# Patient Record
Sex: FEMALE | Race: WHITE | NOT HISPANIC OR LATINO
[De-identification: names, ages, dates, MRNs, and addresses within clinical notes are randomized per-mention and may not be internally consistent; named-entity substitution may affect disease eponyms.]

---

## 2017-04-27 ENCOUNTER — APPOINTMENT (OUTPATIENT)
Dept: ENDOCRINOLOGY | Facility: CLINIC | Age: 82
End: 2017-04-27

## 2017-04-27 VITALS
SYSTOLIC BLOOD PRESSURE: 110 MMHG | BODY MASS INDEX: 23.19 KG/M2 | HEIGHT: 58.2 IN | OXYGEN SATURATION: 95 % | DIASTOLIC BLOOD PRESSURE: 70 MMHG | WEIGHT: 112 LBS | HEART RATE: 93 BPM

## 2017-07-11 ENCOUNTER — APPOINTMENT (OUTPATIENT)
Dept: VASCULAR SURGERY | Facility: CLINIC | Age: 82
End: 2017-07-11

## 2017-07-11 VITALS
HEIGHT: 58 IN | DIASTOLIC BLOOD PRESSURE: 80 MMHG | WEIGHT: 110 LBS | HEART RATE: 67 BPM | BODY MASS INDEX: 23.09 KG/M2 | TEMPERATURE: 97.9 F | SYSTOLIC BLOOD PRESSURE: 169 MMHG

## 2017-07-11 DIAGNOSIS — Z87.898 PERSONAL HISTORY OF OTHER SPECIFIED CONDITIONS: ICD-10-CM

## 2017-09-07 ENCOUNTER — RX RENEWAL (OUTPATIENT)
Age: 82
End: 2017-09-07

## 2017-10-05 ENCOUNTER — MEDICATION RENEWAL (OUTPATIENT)
Age: 82
End: 2017-10-05

## 2017-11-09 ENCOUNTER — APPOINTMENT (OUTPATIENT)
Dept: ENDOCRINOLOGY | Facility: CLINIC | Age: 82
End: 2017-11-09
Payer: MEDICARE

## 2017-11-09 VITALS — WEIGHT: 110 LBS | BODY MASS INDEX: 23.09 KG/M2 | HEIGHT: 58 IN | OXYGEN SATURATION: 97 % | HEART RATE: 69 BPM

## 2017-11-09 VITALS — SYSTOLIC BLOOD PRESSURE: 128 MMHG | DIASTOLIC BLOOD PRESSURE: 80 MMHG

## 2017-11-09 PROCEDURE — 99213 OFFICE O/P EST LOW 20 MIN: CPT

## 2017-12-01 ENCOUNTER — RX RENEWAL (OUTPATIENT)
Age: 82
End: 2017-12-01

## 2017-12-28 ENCOUNTER — MEDICATION RENEWAL (OUTPATIENT)
Age: 82
End: 2017-12-28

## 2018-02-12 ENCOUNTER — RX RENEWAL (OUTPATIENT)
Age: 83
End: 2018-02-12

## 2018-02-27 ENCOUNTER — APPOINTMENT (OUTPATIENT)
Dept: VASCULAR SURGERY | Facility: CLINIC | Age: 83
End: 2018-02-27
Payer: MEDICARE

## 2018-02-27 VITALS
BODY MASS INDEX: 23.09 KG/M2 | SYSTOLIC BLOOD PRESSURE: 130 MMHG | DIASTOLIC BLOOD PRESSURE: 86 MMHG | WEIGHT: 110 LBS | HEIGHT: 58 IN | HEART RATE: 72 BPM | TEMPERATURE: 98 F

## 2018-02-27 DIAGNOSIS — Z82.49 FAMILY HISTORY OF ISCHEMIC HEART DISEASE AND OTHER DISEASES OF THE CIRCULATORY SYSTEM: ICD-10-CM

## 2018-02-27 DIAGNOSIS — M48.00 SPINAL STENOSIS, SITE UNSPECIFIED: ICD-10-CM

## 2018-02-27 DIAGNOSIS — Z87.19 PERSONAL HISTORY OF OTHER DISEASES OF THE DIGESTIVE SYSTEM: ICD-10-CM

## 2018-02-27 DIAGNOSIS — Z87.42 PERSONAL HISTORY OF OTHER DISEASES OF THE FEMALE GENITAL TRACT: ICD-10-CM

## 2018-02-27 PROCEDURE — 99213 OFFICE O/P EST LOW 20 MIN: CPT

## 2018-02-27 PROCEDURE — 93971 EXTREMITY STUDY: CPT

## 2018-05-09 ENCOUNTER — APPOINTMENT (OUTPATIENT)
Dept: ENDOCRINOLOGY | Facility: CLINIC | Age: 83
End: 2018-05-09
Payer: MEDICARE

## 2018-05-09 VITALS
SYSTOLIC BLOOD PRESSURE: 120 MMHG | HEIGHT: 58 IN | HEART RATE: 75 BPM | OXYGEN SATURATION: 96 % | DIASTOLIC BLOOD PRESSURE: 80 MMHG | WEIGHT: 109 LBS | BODY MASS INDEX: 22.88 KG/M2

## 2018-05-09 PROCEDURE — 99213 OFFICE O/P EST LOW 20 MIN: CPT

## 2018-05-09 RX ORDER — ALENDRONATE SODIUM 70 MG/1
TABLET ORAL
Refills: 0 | Status: DISCONTINUED | COMMUNITY
End: 2018-05-09

## 2018-05-25 ENCOUNTER — RX RENEWAL (OUTPATIENT)
Age: 83
End: 2018-05-25

## 2018-06-25 ENCOUNTER — APPOINTMENT (OUTPATIENT)
Dept: GASTROENTEROLOGY | Facility: CLINIC | Age: 83
End: 2018-06-25
Payer: MEDICARE

## 2018-06-25 VITALS
DIASTOLIC BLOOD PRESSURE: 80 MMHG | WEIGHT: 110 LBS | HEART RATE: 65 BPM | OXYGEN SATURATION: 93 % | HEIGHT: 59 IN | SYSTOLIC BLOOD PRESSURE: 112 MMHG | BODY MASS INDEX: 22.18 KG/M2

## 2018-06-25 DIAGNOSIS — M19.90 UNSPECIFIED OSTEOARTHRITIS, UNSPECIFIED SITE: ICD-10-CM

## 2018-06-25 DIAGNOSIS — Z78.9 OTHER SPECIFIED HEALTH STATUS: ICD-10-CM

## 2018-06-25 DIAGNOSIS — Z83.79 FAMILY HISTORY OF OTHER DISEASES OF THE DIGESTIVE SYSTEM: ICD-10-CM

## 2018-06-25 DIAGNOSIS — Z80.0 FAMILY HISTORY OF MALIGNANT NEOPLASM OF DIGESTIVE ORGANS: ICD-10-CM

## 2018-06-25 DIAGNOSIS — N83.202 UNSPECIFIED OVARIAN CYST, LEFT SIDE: ICD-10-CM

## 2018-06-25 DIAGNOSIS — Z82.49 FAMILY HISTORY OF ISCHEMIC HEART DISEASE AND OTHER DISEASES OF THE CIRCULATORY SYSTEM: ICD-10-CM

## 2018-06-25 DIAGNOSIS — M19.231 SECONDARY OSTEOARTHRITIS, RIGHT WRIST: ICD-10-CM

## 2018-06-25 PROCEDURE — 99204 OFFICE O/P NEW MOD 45 MIN: CPT

## 2018-06-25 RX ORDER — DOCUSATE SODIUM 100 MG/1
TABLET ORAL
Refills: 0 | Status: ACTIVE | COMMUNITY

## 2018-07-30 ENCOUNTER — RECORD ABSTRACTING (OUTPATIENT)
Age: 83
End: 2018-07-30

## 2018-07-30 DIAGNOSIS — K21.9 GASTRO-ESOPHAGEAL REFLUX DISEASE W/OUT ESOPHAGITIS: ICD-10-CM

## 2018-07-30 DIAGNOSIS — I73.9 PERIPHERAL VASCULAR DISEASE, UNSPECIFIED: ICD-10-CM

## 2018-08-13 ENCOUNTER — APPOINTMENT (OUTPATIENT)
Dept: GASTROENTEROLOGY | Facility: CLINIC | Age: 83
End: 2018-08-13
Payer: MEDICARE

## 2018-08-13 VITALS
OXYGEN SATURATION: 96 % | BODY MASS INDEX: 22.18 KG/M2 | SYSTOLIC BLOOD PRESSURE: 160 MMHG | HEIGHT: 59 IN | WEIGHT: 110 LBS | HEART RATE: 75 BPM | TEMPERATURE: 96.9 F | DIASTOLIC BLOOD PRESSURE: 90 MMHG

## 2018-08-13 DIAGNOSIS — R19.8 OTHER SPECIFIED SYMPTOMS AND SIGNS INVOLVING THE DIGESTIVE SYSTEM AND ABDOMEN: ICD-10-CM

## 2018-08-13 PROCEDURE — 99214 OFFICE O/P EST MOD 30 MIN: CPT

## 2018-08-17 ENCOUNTER — APPOINTMENT (OUTPATIENT)
Dept: PULMONOLOGY | Facility: CLINIC | Age: 83
End: 2018-08-17
Payer: MEDICARE

## 2018-08-17 ENCOUNTER — MESSAGE (OUTPATIENT)
Age: 83
End: 2018-08-17

## 2018-08-17 VITALS — SYSTOLIC BLOOD PRESSURE: 148 MMHG | DIASTOLIC BLOOD PRESSURE: 80 MMHG

## 2018-08-17 VITALS
HEIGHT: 59 IN | BODY MASS INDEX: 22.18 KG/M2 | RESPIRATION RATE: 16 BRPM | OXYGEN SATURATION: 95 % | WEIGHT: 110 LBS | HEART RATE: 71 BPM | TEMPERATURE: 98.4 F

## 2018-08-17 PROCEDURE — 99213 OFFICE O/P EST LOW 20 MIN: CPT

## 2018-08-19 NOTE — REVIEW OF SYSTEMS
[Abdominal Pain] : abdominal pain [Constipation] : constipation [Anxiety] : anxiety [Negative] : Heme/Lymph

## 2018-08-19 NOTE — HISTORY OF PRESENT ILLNESS
[FreeTextEntry1] : constipation [de-identified] : Here to f/u on GI eval for constipation. Told of dx of IBS-constipation. Did not try meds yet due to cost. Was satisfied with consultant but not sure she wants to go back

## 2018-08-19 NOTE — ASSESSMENT
[FreeTextEntry1] : GI note appreciated.\par \par Will give trial of Linzess-samples given.\par \par Also told to monitor diet\par

## 2018-08-28 ENCOUNTER — APPOINTMENT (OUTPATIENT)
Dept: GASTROENTEROLOGY | Facility: HOSPITAL | Age: 83
End: 2018-08-28

## 2018-10-01 ENCOUNTER — RX RENEWAL (OUTPATIENT)
Age: 83
End: 2018-10-01

## 2018-10-04 ENCOUNTER — APPOINTMENT (OUTPATIENT)
Dept: PULMONOLOGY | Facility: CLINIC | Age: 83
End: 2018-10-04
Payer: MEDICARE

## 2018-10-04 VITALS
HEART RATE: 70 BPM | DIASTOLIC BLOOD PRESSURE: 74 MMHG | RESPIRATION RATE: 12 BRPM | SYSTOLIC BLOOD PRESSURE: 124 MMHG | TEMPERATURE: 97.9 F | OXYGEN SATURATION: 97 %

## 2018-10-04 PROCEDURE — 90662 IIV NO PRSV INCREASED AG IM: CPT

## 2018-10-04 PROCEDURE — 99213 OFFICE O/P EST LOW 20 MIN: CPT | Mod: 25

## 2018-10-04 PROCEDURE — G0008: CPT

## 2018-10-08 ENCOUNTER — APPOINTMENT (OUTPATIENT)
Dept: ENDOCRINOLOGY | Facility: CLINIC | Age: 83
End: 2018-10-08
Payer: MEDICARE

## 2018-10-08 VITALS
WEIGHT: 112 LBS | BODY MASS INDEX: 22.58 KG/M2 | DIASTOLIC BLOOD PRESSURE: 60 MMHG | SYSTOLIC BLOOD PRESSURE: 112 MMHG | HEIGHT: 59 IN

## 2018-10-08 LAB
ALBUMIN SERPL ELPH-MCNC: 4.1 G/DL
ALP BLD-CCNC: 51 U/L
ALT SERPL-CCNC: 16 U/L
ANION GAP SERPL CALC-SCNC: 14 MMOL/L
AST SERPL-CCNC: 28 U/L
BASOPHILS # BLD AUTO: 0.04 K/UL
BASOPHILS NFR BLD AUTO: 0.6 %
BILIRUB SERPL-MCNC: 0.2 MG/DL
BUN SERPL-MCNC: 20 MG/DL
CALCIUM SERPL-MCNC: 10 MG/DL
CHLORIDE SERPL-SCNC: 97 MMOL/L
CO2 SERPL-SCNC: 26 MMOL/L
CREAT SERPL-MCNC: 1.22 MG/DL
EOSINOPHIL # BLD AUTO: 0.15 K/UL
EOSINOPHIL NFR BLD AUTO: 2.3 %
GLUCOSE SERPL-MCNC: 122 MG/DL
HCT VFR BLD CALC: 36.7 %
HGB BLD-MCNC: 11.6 G/DL
IMM GRANULOCYTES NFR BLD AUTO: 0 %
LYMPHOCYTES # BLD AUTO: 2.1 K/UL
LYMPHOCYTES NFR BLD AUTO: 32.4 %
MAN DIFF?: NORMAL
MCHC RBC-ENTMCNC: 29.9 PG
MCHC RBC-ENTMCNC: 31.6 GM/DL
MCV RBC AUTO: 94.6 FL
MONOCYTES # BLD AUTO: 0.4 K/UL
MONOCYTES NFR BLD AUTO: 6.2 %
NEUTROPHILS # BLD AUTO: 3.8 K/UL
NEUTROPHILS NFR BLD AUTO: 58.5 %
PLATELET # BLD AUTO: 208 K/UL
POTASSIUM SERPL-SCNC: 4.5 MMOL/L
PROT SERPL-MCNC: 7 G/DL
RBC # BLD: 3.88 M/UL
RBC # FLD: 14.6 %
SODIUM SERPL-SCNC: 137 MMOL/L
WBC # FLD AUTO: 6.49 K/UL

## 2018-10-08 PROCEDURE — 99214 OFFICE O/P EST MOD 30 MIN: CPT

## 2018-10-29 ENCOUNTER — APPOINTMENT (OUTPATIENT)
Dept: ENDOCRINOLOGY | Facility: CLINIC | Age: 83
End: 2018-10-29
Payer: MEDICARE

## 2018-10-29 PROCEDURE — 96372 THER/PROPH/DIAG INJ SC/IM: CPT

## 2018-10-29 PROCEDURE — 96401 CHEMO ANTI-NEOPL SQ/IM: CPT

## 2018-10-29 RX ORDER — DENOSUMAB 60 MG/ML
60 INJECTION SUBCUTANEOUS
Qty: 1 | Refills: 0 | Status: COMPLETED | OUTPATIENT
Start: 2018-10-29

## 2018-10-29 RX ADMIN — DENOSUMAB 0 MG/ML: 60 INJECTION SUBCUTANEOUS at 00:00

## 2018-12-18 ENCOUNTER — APPOINTMENT (OUTPATIENT)
Dept: PULMONOLOGY | Facility: CLINIC | Age: 83
End: 2018-12-18
Payer: MEDICARE

## 2018-12-18 VITALS — SYSTOLIC BLOOD PRESSURE: 130 MMHG | DIASTOLIC BLOOD PRESSURE: 80 MMHG

## 2018-12-18 VITALS — OXYGEN SATURATION: 98 % | TEMPERATURE: 97.9 F | HEART RATE: 70 BPM

## 2018-12-18 PROCEDURE — 99213 OFFICE O/P EST LOW 20 MIN: CPT | Mod: 25

## 2018-12-18 PROCEDURE — 36415 COLL VENOUS BLD VENIPUNCTURE: CPT

## 2018-12-19 LAB — TSH SERPL-ACNC: 4.04 UIU/ML

## 2018-12-31 ENCOUNTER — APPOINTMENT (OUTPATIENT)
Dept: INTERNAL MEDICINE | Facility: CLINIC | Age: 83
End: 2018-12-31
Payer: MEDICARE

## 2018-12-31 VITALS
TEMPERATURE: 98.3 F | BODY MASS INDEX: 21.57 KG/M2 | WEIGHT: 107 LBS | SYSTOLIC BLOOD PRESSURE: 162 MMHG | DIASTOLIC BLOOD PRESSURE: 90 MMHG | HEIGHT: 59 IN

## 2018-12-31 PROCEDURE — 36415 COLL VENOUS BLD VENIPUNCTURE: CPT

## 2018-12-31 PROCEDURE — 99214 OFFICE O/P EST MOD 30 MIN: CPT | Mod: 25

## 2019-01-02 LAB
ALBUMIN SERPL ELPH-MCNC: 4.1 G/DL
ALP BLD-CCNC: 43 U/L
ALT SERPL-CCNC: 18 U/L
AMYLASE/CREAT SERPL: 75 U/L
ANION GAP SERPL CALC-SCNC: 10 MMOL/L
AST SERPL-CCNC: 26 U/L
BASOPHILS # BLD AUTO: 0.03 K/UL
BASOPHILS NFR BLD AUTO: 0.4 %
BILIRUB SERPL-MCNC: 0.3 MG/DL
BUN SERPL-MCNC: 11 MG/DL
CALCIUM SERPL-MCNC: 9.6 MG/DL
CHLORIDE SERPL-SCNC: 96 MMOL/L
CO2 SERPL-SCNC: 26 MMOL/L
CREAT SERPL-MCNC: 1.14 MG/DL
EOSINOPHIL # BLD AUTO: 0.23 K/UL
EOSINOPHIL NFR BLD AUTO: 3.4 %
GLUCOSE SERPL-MCNC: 114 MG/DL
HCT VFR BLD CALC: 37.9 %
HGB BLD-MCNC: 12.2 G/DL
IMM GRANULOCYTES NFR BLD AUTO: 0.1 %
LPL SERPL-CCNC: 45 U/L
LYMPHOCYTES # BLD AUTO: 2.37 K/UL
LYMPHOCYTES NFR BLD AUTO: 35 %
MAN DIFF?: NORMAL
MCHC RBC-ENTMCNC: 29.7 PG
MCHC RBC-ENTMCNC: 32.2 GM/DL
MCV RBC AUTO: 92.2 FL
MONOCYTES # BLD AUTO: 0.49 K/UL
MONOCYTES NFR BLD AUTO: 7.2 %
NEUTROPHILS # BLD AUTO: 3.64 K/UL
NEUTROPHILS NFR BLD AUTO: 53.9 %
PLATELET # BLD AUTO: 236 K/UL
POTASSIUM SERPL-SCNC: 5.1 MMOL/L
PROT SERPL-MCNC: 7.3 G/DL
RBC # BLD: 4.11 M/UL
RBC # FLD: 13.8 %
SODIUM SERPL-SCNC: 132 MMOL/L
WBC # FLD AUTO: 6.77 K/UL

## 2019-01-10 ENCOUNTER — MESSAGE (OUTPATIENT)
Age: 84
End: 2019-01-10

## 2019-01-24 ENCOUNTER — APPOINTMENT (OUTPATIENT)
Dept: INTERNAL MEDICINE | Facility: CLINIC | Age: 84
End: 2019-01-24

## 2019-01-31 ENCOUNTER — LABORATORY RESULT (OUTPATIENT)
Age: 84
End: 2019-01-31

## 2019-01-31 ENCOUNTER — APPOINTMENT (OUTPATIENT)
Dept: INTERNAL MEDICINE | Facility: CLINIC | Age: 84
End: 2019-01-31
Payer: MEDICARE

## 2019-01-31 PROCEDURE — 45380 COLONOSCOPY AND BIOPSY: CPT | Mod: 59

## 2019-01-31 PROCEDURE — 45385 COLONOSCOPY W/LESION REMOVAL: CPT

## 2019-02-05 ENCOUNTER — MESSAGE (OUTPATIENT)
Age: 84
End: 2019-02-05

## 2019-02-11 ENCOUNTER — APPOINTMENT (OUTPATIENT)
Dept: GASTROENTEROLOGY | Facility: CLINIC | Age: 84
End: 2019-02-11

## 2019-02-25 ENCOUNTER — APPOINTMENT (OUTPATIENT)
Dept: INTERNAL MEDICINE | Facility: CLINIC | Age: 84
End: 2019-02-25
Payer: MEDICARE

## 2019-02-25 VITALS
OXYGEN SATURATION: 97 % | WEIGHT: 110 LBS | HEIGHT: 59 IN | DIASTOLIC BLOOD PRESSURE: 70 MMHG | BODY MASS INDEX: 22.18 KG/M2 | SYSTOLIC BLOOD PRESSURE: 110 MMHG | TEMPERATURE: 97.1 F | HEART RATE: 69 BPM

## 2019-02-25 PROCEDURE — 99213 OFFICE O/P EST LOW 20 MIN: CPT

## 2019-02-25 RX ORDER — KONDREMUL 2.5 ML/5ML
50 LIQUID ORAL DAILY
Qty: 1 | Refills: 3 | Status: ACTIVE | COMMUNITY
Start: 2019-02-25 | End: 1900-01-01

## 2019-02-25 NOTE — PHYSICAL EXAM
[General Appearance - Alert] : alert [General Appearance - In No Acute Distress] : in no acute distress [] : no respiratory distress [Auscultation Breath Sounds / Voice Sounds] : lungs were clear to auscultation bilaterally [Bowel Sounds] : normal bowel sounds [Abdomen Soft] : soft

## 2019-03-22 ENCOUNTER — APPOINTMENT (OUTPATIENT)
Dept: PULMONOLOGY | Facility: CLINIC | Age: 84
End: 2019-03-22
Payer: MEDICARE

## 2019-03-22 VITALS
RESPIRATION RATE: 16 BRPM | SYSTOLIC BLOOD PRESSURE: 130 MMHG | DIASTOLIC BLOOD PRESSURE: 82 MMHG | OXYGEN SATURATION: 98 % | HEART RATE: 72 BPM

## 2019-03-22 PROCEDURE — 99213 OFFICE O/P EST LOW 20 MIN: CPT

## 2019-03-24 NOTE — ASSESSMENT
[FreeTextEntry1] : Overall patient appears well. Fatigue likely related to grief.\par Had great difficulty ascertaining a mallory blood pressure today eventually had to resort to forearm palpation to systolic of 110.

## 2019-03-24 NOTE — REVIEW OF SYSTEMS
[As Noted in HPI] : as noted in HPI [Constipation] : constipation [Abdominal Pain] : abdominal pain [Negative] : Sleep Disorder

## 2019-03-24 NOTE — HISTORY OF PRESENT ILLNESS
[FreeTextEntry1] : f/u for HTN\par Notes fatigue-sister dies, just got up from [place]\par constipation improved

## 2019-04-18 ENCOUNTER — MEDICATION RENEWAL (OUTPATIENT)
Age: 84
End: 2019-04-18

## 2019-04-18 RX ORDER — DENOSUMAB 60 MG/ML
60 INJECTION SUBCUTANEOUS
Qty: 1 | Refills: 1 | Status: DISCONTINUED | COMMUNITY
Start: 2018-10-09 | End: 2019-04-18

## 2019-04-29 ENCOUNTER — APPOINTMENT (OUTPATIENT)
Dept: PULMONOLOGY | Facility: CLINIC | Age: 84
End: 2019-04-29
Payer: MEDICARE

## 2019-04-29 VITALS — SYSTOLIC BLOOD PRESSURE: 140 MMHG | DIASTOLIC BLOOD PRESSURE: 80 MMHG

## 2019-04-29 VITALS
HEART RATE: 88 BPM | WEIGHT: 110 LBS | BODY MASS INDEX: 22.18 KG/M2 | RESPIRATION RATE: 16 BRPM | HEIGHT: 59 IN | OXYGEN SATURATION: 99 %

## 2019-04-29 DIAGNOSIS — R10.32 LEFT LOWER QUADRANT PAIN: ICD-10-CM

## 2019-04-29 DIAGNOSIS — G89.29 LEFT LOWER QUADRANT PAIN: ICD-10-CM

## 2019-04-29 LAB
BILIRUB UR QL STRIP: NEGATIVE
CLARITY UR: CLEAR
COLLECTION METHOD: NORMAL
GLUCOSE UR-MCNC: NEGATIVE
HCG UR QL: 0.2 EU/DL
HGB UR QL STRIP.AUTO: NEGATIVE
KETONES UR-MCNC: NEGATIVE
LEUKOCYTE ESTERASE UR QL STRIP: NEGATIVE
NITRITE UR QL STRIP: NEGATIVE
PH UR STRIP: 7.5
PROT UR STRIP-MCNC: NORMAL
SP GR UR STRIP: 1.01

## 2019-04-29 PROCEDURE — 99214 OFFICE O/P EST MOD 30 MIN: CPT | Mod: 25

## 2019-04-29 PROCEDURE — 81003 URINALYSIS AUTO W/O SCOPE: CPT | Mod: QW

## 2019-04-29 PROCEDURE — 93000 ELECTROCARDIOGRAM COMPLETE: CPT

## 2019-04-29 PROCEDURE — 36415 COLL VENOUS BLD VENIPUNCTURE: CPT

## 2019-04-29 RX ORDER — LACTULOSE 10 G/15ML
10 SOLUTION ORAL DAILY
Qty: 1 | Refills: 5 | Status: DISCONTINUED | COMMUNITY
Start: 2019-01-31 | End: 2019-04-29

## 2019-04-29 RX ORDER — LINACLOTIDE 72 UG/1
72 CAPSULE, GELATIN COATED ORAL DAILY
Qty: 30 | Refills: 0 | Status: DISCONTINUED | COMMUNITY
Start: 2018-08-15 | End: 2019-04-29

## 2019-04-29 RX ORDER — POLYETHYLENE GLYCOL 3350 17 G/17G
17 POWDER, FOR SOLUTION ORAL DAILY
Qty: 510 | Refills: 3 | Status: DISCONTINUED | COMMUNITY
Start: 2018-06-25 | End: 2019-04-29

## 2019-04-29 RX ORDER — SIMETHICONE 180 MG
CAPSULE ORAL
Refills: 0 | Status: DISCONTINUED | COMMUNITY
End: 2019-04-29

## 2019-04-30 ENCOUNTER — NON-APPOINTMENT (OUTPATIENT)
Age: 84
End: 2019-04-30

## 2019-04-30 NOTE — HISTORY OF PRESENT ILLNESS
[FreeTextEntry1] : f/u for HTN\par bowels better\par persistent abdominal pain\par unresolved grief issues

## 2019-04-30 NOTE — REVIEW OF SYSTEMS
[Abdominal Pain] : abdominal pain [Negative] : Sleep Disorder [Nausea] : no nausea [Vomiting] : no vomiting [Constipation] : no constipation

## 2019-05-01 LAB
25(OH)D3 SERPL-MCNC: 39 NG/ML
ALBUMIN SERPL ELPH-MCNC: 4.3 G/DL
ALP BLD-CCNC: 41 U/L
ALT SERPL-CCNC: 25 U/L
ANION GAP SERPL CALC-SCNC: 13 MMOL/L
AST SERPL-CCNC: 30 U/L
BASOPHILS # BLD AUTO: 0.06 K/UL
BASOPHILS NFR BLD AUTO: 0.8 %
BILIRUB SERPL-MCNC: 0.3 MG/DL
BUN SERPL-MCNC: 14 MG/DL
CALCIUM SERPL-MCNC: 9.8 MG/DL
CHLORIDE SERPL-SCNC: 95 MMOL/L
CHOLEST SERPL-MCNC: 199 MG/DL
CHOLEST/HDLC SERPL: 3.2 RATIO
CO2 SERPL-SCNC: 26 MMOL/L
CREAT SERPL-MCNC: 1.1 MG/DL
EOSINOPHIL # BLD AUTO: 0.2 K/UL
EOSINOPHIL NFR BLD AUTO: 2.7 %
GLUCOSE SERPL-MCNC: 88 MG/DL
HCT VFR BLD CALC: 39.5 %
HDLC SERPL-MCNC: 62 MG/DL
HGB BLD-MCNC: 12.2 G/DL
IMM GRANULOCYTES NFR BLD AUTO: 0.3 %
LDLC SERPL CALC-MCNC: 117 MG/DL
LYMPHOCYTES # BLD AUTO: 1.99 K/UL
LYMPHOCYTES NFR BLD AUTO: 26.6 %
MAN DIFF?: NORMAL
MCHC RBC-ENTMCNC: 29.2 PG
MCHC RBC-ENTMCNC: 30.9 GM/DL
MCV RBC AUTO: 94.5 FL
MONOCYTES # BLD AUTO: 0.62 K/UL
MONOCYTES NFR BLD AUTO: 8.3 %
NEUTROPHILS # BLD AUTO: 4.58 K/UL
NEUTROPHILS NFR BLD AUTO: 61.3 %
PLATELET # BLD AUTO: 210 K/UL
POTASSIUM SERPL-SCNC: 4.6 MMOL/L
PROT SERPL-MCNC: 7.2 G/DL
RBC # BLD: 4.18 M/UL
RBC # FLD: 14.1 %
SODIUM SERPL-SCNC: 134 MMOL/L
TRIGL SERPL-MCNC: 99 MG/DL
TSH SERPL-ACNC: 4.17 UIU/ML
VIT B12 SERPL-MCNC: 959 PG/ML
WBC # FLD AUTO: 7.47 K/UL

## 2019-05-02 ENCOUNTER — APPOINTMENT (OUTPATIENT)
Dept: INTERNAL MEDICINE | Facility: CLINIC | Age: 84
End: 2019-05-02
Payer: MEDICARE

## 2019-05-02 VITALS
SYSTOLIC BLOOD PRESSURE: 130 MMHG | HEART RATE: 78 BPM | HEIGHT: 59 IN | DIASTOLIC BLOOD PRESSURE: 80 MMHG | BODY MASS INDEX: 22.58 KG/M2 | OXYGEN SATURATION: 97 % | TEMPERATURE: 96.9 F | WEIGHT: 112 LBS

## 2019-05-02 PROCEDURE — 99213 OFFICE O/P EST LOW 20 MIN: CPT

## 2019-05-02 NOTE — ASSESSMENT
[FreeTextEntry1] : \par IBS - C\par \par moving bowels on present regimen!  but having gas and "fluttering"\par \par switch to Citrucel\par Try Librax

## 2019-05-02 NOTE — PHYSICAL EXAM
[General Appearance - Alert] : alert [Abdomen Soft] : soft [General Appearance - In No Acute Distress] : in no acute distress [Bowel Sounds] : normal bowel sounds [Abdomen Mass (___ Cm)] : no abdominal mass palpated [] : no hepato-splenomegaly [Abdomen Tenderness] : non-tender

## 2019-05-02 NOTE — HISTORY OF PRESENT ILLNESS
[FreeTextEntry1] : \par taking metamucil in the morning and mineral oil at night\par gets pain across lower abd, fluttering - after BM\par can flutter for hours, can wake her up

## 2019-05-06 ENCOUNTER — APPOINTMENT (OUTPATIENT)
Dept: ENDOCRINOLOGY | Facility: CLINIC | Age: 84
End: 2019-05-06
Payer: MEDICARE

## 2019-05-06 VITALS
DIASTOLIC BLOOD PRESSURE: 70 MMHG | HEIGHT: 59 IN | BODY MASS INDEX: 23.18 KG/M2 | OXYGEN SATURATION: 94 % | SYSTOLIC BLOOD PRESSURE: 120 MMHG | HEART RATE: 94 BPM | WEIGHT: 115 LBS

## 2019-05-06 PROCEDURE — 96401 CHEMO ANTI-NEOPL SQ/IM: CPT

## 2019-05-06 PROCEDURE — 96372 THER/PROPH/DIAG INJ SC/IM: CPT

## 2019-05-06 PROCEDURE — 99214 OFFICE O/P EST MOD 30 MIN: CPT | Mod: 25

## 2019-05-06 RX ORDER — DENOSUMAB 60 MG/ML
60 INJECTION SUBCUTANEOUS
Qty: 1 | Refills: 0 | Status: COMPLETED | OUTPATIENT
Start: 2019-05-06

## 2019-05-06 RX ADMIN — DENOSUMAB 0 MG/ML: 60 INJECTION SUBCUTANEOUS at 00:00

## 2019-05-06 NOTE — PHYSICAL EXAM
[No Acute Distress] : no acute distress [Alert] : alert [EOMI] : extra ocular movement intact [Well Nourished] : well nourished [Well Developed] : well developed [No Respiratory Distress] : no respiratory distress [Normal Rate and Effort] : normal respiratory rhythm and effort [No Accessory Muscle Use] : no accessory muscle use [Clear to Auscultation] : lungs were clear to auscultation bilaterally [Normal Rate] : heart rate was normal  [Normal S1, S2] : normal S1 and S2 [Regular Rhythm] : with a regular rhythm [Not Tender] : non-tender [Normal Bowel Sounds] : normal bowel sounds [Soft] : abdomen soft [Normal Gait] : normal gait [No Tremors] : no tremors [No Motor Deficits] : the motor exam was normal [No Joint Swelling] : no joint swelling seen [Normal Mood] : the mood was normal [Normal Affect] : the affect was normal

## 2019-05-06 NOTE — HISTORY OF PRESENT ILLNESS
[FreeTextEntry1] : 88-year-old woman here for follow up of osteoporosis.\par She took bisphosphonate in the past for about 9 years then was on drug holiday for 4-5 years. The patient was then prescribed 2 doses of prolia. Due to insurance issues, she had to stop prolia and was ultimately switched to Fosamax in November 2018 \par Per Dr. Mahajan's notes, the patient also had some sort of dental issue with teeth while she was on prolia. Patient clarifies and states that her dentist refused to do dental work while on anti-resorptives but denies any ONJ\par No interval fractures: did fall while climbing over a barrier, son's dog gave her a push\par She takes calcium and vitamin D\par History of wrist fx 30 years ago\par Surveillance DXA was done in 2017 and she had stable bone density\par Has 2 sons 5 grandchildren\par Lives alone and is very active

## 2019-05-06 NOTE — ASSESSMENT
[FreeTextEntry1] : Patient is an 87 yo woman with osteoporosis\par \par 1. Osteoporosis\par -patient was advised to stop Fosamax in the setting of IBS-C. Unlikely to be the cause of constipation but she prefers to stay off of it and see if symptoms resolve\par -she has not fractured but did fall twice. Discussed fall prevention including avoiding clutter, being careful around pets, etc\par -she would like a re-trial of Prolia for osteoporosis. Received first dose without any problems October 2018. Has not had interval fractures\par -dose #2 provided today\par -discussed risk of ONJ and AFF\par \par Follow up in 6 months for DXA (if not done by Dr. Mancini) and scheduled PRL [Denosumab Therapy] : Risks  and benefits of denosumab therapy were discussed with the patient including eczema, cellulitis, osteonecrosis of the jaw and atypical femur fractures

## 2019-05-06 NOTE — REVIEW OF SYSTEMS
[Fatigue] : no fatigue [Chest Pain] : no chest pain [Decreased Appetite] : appetite not decreased [Heart Rate Is Slow] : the heart rate was not slow [Heart Rate Is Fast] : the heart rate was not fast [Palpitations] : no palpitations [Vomiting] : no vomiting was observed [Nausea] : no nausea [Constipation] : no constipation [Diarrhea] : no diarrhea [All other systems negative] : All other systems negative [Negative] : Musculoskeletal

## 2019-05-10 ENCOUNTER — APPOINTMENT (OUTPATIENT)
Dept: INTERNAL MEDICINE | Facility: CLINIC | Age: 84
End: 2019-05-10

## 2019-05-28 ENCOUNTER — FORM ENCOUNTER (OUTPATIENT)
Age: 84
End: 2019-05-28

## 2019-05-28 ENCOUNTER — APPOINTMENT (OUTPATIENT)
Dept: PULMONOLOGY | Facility: CLINIC | Age: 84
End: 2019-05-28
Payer: MEDICARE

## 2019-05-28 VITALS — TEMPERATURE: 97.7 F | HEART RATE: 77 BPM | OXYGEN SATURATION: 96 %

## 2019-05-28 VITALS — DIASTOLIC BLOOD PRESSURE: 72 MMHG | SYSTOLIC BLOOD PRESSURE: 132 MMHG

## 2019-05-28 DIAGNOSIS — Z87.09 PERSONAL HISTORY OF OTHER DISEASES OF THE RESPIRATORY SYSTEM: ICD-10-CM

## 2019-05-28 PROCEDURE — 99213 OFFICE O/P EST LOW 20 MIN: CPT | Mod: 25

## 2019-05-28 PROCEDURE — 71046 X-RAY EXAM CHEST 2 VIEWS: CPT

## 2019-05-28 PROCEDURE — 94060 EVALUATION OF WHEEZING: CPT

## 2019-05-28 NOTE — PHYSICAL EXAM
[Normal Appearance] : normal appearance [General Appearance - Well Developed] : well developed [Well Groomed] : well groomed [General Appearance - Well Nourished] : well nourished [No Deformities] : no deformities [General Appearance - In No Acute Distress] : no acute distress [Normal Conjunctiva] : the conjunctiva exhibited no abnormalities [Eyelids - No Xanthelasma] : the eyelids demonstrated no xanthelasmas [Normal Oropharynx] : normal oropharynx [Neck Appearance] : the appearance of the neck was normal [Neck Cervical Mass (___cm)] : no neck mass was observed [Jugular Venous Distention Increased] : there was no jugular-venous distention [Thyroid Nodule] : there were no palpable thyroid nodules [Thyroid Diffuse Enlargement] : the thyroid was not enlarged [Heart Rate And Rhythm] : heart rate and rhythm were normal [Murmurs] : no murmurs present [Heart Sounds] : normal S1 and S2 [Abdomen Soft] : soft [FreeTextEntry1] : Mild expiratory wheeze and rhonchi noted [Abdomen Tenderness] : non-tender [Abdomen Mass (___ Cm)] : no abdominal mass palpated [Abnormal Walk] : normal gait [Gait - Sufficient For Exercise Testing] : the gait was sufficient for exercise testing [Nail Clubbing] : no clubbing of the fingernails [Cyanosis, Localized] : no localized cyanosis [Petechial Hemorrhages (___cm)] : no petechial hemorrhages [Skin Color & Pigmentation] : normal skin color and pigmentation [] : no rash [No Venous Stasis] : no venous stasis [Skin Lesions] : no skin lesions [No Skin Ulcers] : no skin ulcer [No Xanthoma] : no  xanthoma was observed [Sensation] : the sensory exam was normal to light touch and pinprick [Deep Tendon Reflexes (DTR)] : deep tendon reflexes were 2+ and symmetric [No Focal Deficits] : no focal deficits [Affect] : the affect was normal [Oriented To Time, Place, And Person] : oriented to person, place, and time [Impaired Insight] : insight and judgment were intact

## 2019-05-28 NOTE — ASSESSMENT
[FreeTextEntry1] : Likely acute bronchitis versus allergic rhinitis. We'll start nasal steroids and course of antibiotics. May use Mucinex to relieve symptoms

## 2019-05-28 NOTE — HISTORY OF PRESENT ILLNESS
[FreeTextEntry1] : Headache, post nasal drip, and coughing x 1week.  Took OTC meds which offered no relief. \par heard wheezing\par

## 2019-06-10 ENCOUNTER — APPOINTMENT (OUTPATIENT)
Dept: PULMONOLOGY | Facility: CLINIC | Age: 84
End: 2019-06-10
Payer: MEDICARE

## 2019-06-11 ENCOUNTER — APPOINTMENT (OUTPATIENT)
Dept: PULMONOLOGY | Facility: CLINIC | Age: 84
End: 2019-06-11
Payer: MEDICARE

## 2019-06-11 VITALS — OXYGEN SATURATION: 98 % | HEART RATE: 77 BPM

## 2019-06-11 PROCEDURE — 99213 OFFICE O/P EST LOW 20 MIN: CPT

## 2019-06-12 NOTE — PHYSICAL EXAM
[General Appearance - Well Developed] : well developed [Normal Appearance] : normal appearance [Well Groomed] : well groomed [General Appearance - Well Nourished] : well nourished [No Deformities] : no deformities [General Appearance - In No Acute Distress] : no acute distress [Normal Conjunctiva] : the conjunctiva exhibited no abnormalities [Eyelids - No Xanthelasma] : the eyelids demonstrated no xanthelasmas [Normal Oropharynx] : normal oropharynx [Neck Appearance] : the appearance of the neck was normal [Neck Cervical Mass (___cm)] : no neck mass was observed [Jugular Venous Distention Increased] : there was no jugular-venous distention [Thyroid Diffuse Enlargement] : the thyroid was not enlarged [Thyroid Nodule] : there were no palpable thyroid nodules [Heart Rate And Rhythm] : heart rate and rhythm were normal [Heart Sounds] : normal S1 and S2 [Murmurs] : no murmurs present [Respiration, Rhythm And Depth] : normal respiratory rhythm and effort [Abdomen Soft] : soft [Abdomen Tenderness] : non-tender [Abdomen Mass (___ Cm)] : no abdominal mass palpated [FreeTextEntry1] : Point tenderness over sacrum. [Nail Clubbing] : no clubbing of the fingernails [Cyanosis, Localized] : no localized cyanosis [Petechial Hemorrhages (___cm)] : no petechial hemorrhages [Skin Color & Pigmentation] : normal skin color and pigmentation [] : no rash [No Venous Stasis] : no venous stasis [Skin Lesions] : no skin lesions [No Skin Ulcers] : no skin ulcer [No Xanthoma] : no  xanthoma was observed [Deep Tendon Reflexes (DTR)] : deep tendon reflexes were 2+ and symmetric [Sensation] : the sensory exam was normal to light touch and pinprick [No Focal Deficits] : no focal deficits [Oriented To Time, Place, And Person] : oriented to person, place, and time [Impaired Insight] : insight and judgment were intact [Affect] : the affect was normal

## 2019-06-12 NOTE — HISTORY OF PRESENT ILLNESS
[FreeTextEntry1] : Notes worsening back pain. Long-standing history of back issues but now notes worsening pain with pain radiating down left leg. No loss of height noted. No change in bladder or bowel habits. Denies recent trauma or fall. Cannot tolerate anti-inflammatories because of GI upset. Has been using Tylenol with some relief.

## 2019-06-12 NOTE — REVIEW OF SYSTEMS
[Fever] : no fever [Chills] : no chills [Cough] : no cough [Sputum] : not coughing up ~M sputum [Back Pain] : ~T back pain [Negative] : Sleep Disorder

## 2019-06-12 NOTE — ASSESSMENT
[FreeTextEntry1] : Acute on chronic back pain. Note marked tenderness over LS spine. Will obtain x-rays to rule out compression fracture. For now add muscle relaxants.

## 2019-07-09 ENCOUNTER — APPOINTMENT (OUTPATIENT)
Dept: GASTROENTEROLOGY | Facility: CLINIC | Age: 84
End: 2019-07-09
Payer: MEDICARE

## 2019-07-09 VITALS
TEMPERATURE: 97.5 F | WEIGHT: 110 LBS | OXYGEN SATURATION: 97 % | HEART RATE: 67 BPM | DIASTOLIC BLOOD PRESSURE: 90 MMHG | HEIGHT: 59 IN | BODY MASS INDEX: 22.18 KG/M2 | SYSTOLIC BLOOD PRESSURE: 140 MMHG

## 2019-07-09 PROCEDURE — 99213 OFFICE O/P EST LOW 20 MIN: CPT

## 2019-07-09 NOTE — HISTORY OF PRESENT ILLNESS
[FreeTextEntry1] : \par \par Still gets spasms in lower abd after urinating and moving bowels - Librax helped but not covered\par \par Still gassy\par \par Moving bowels regularly \par \par \par

## 2019-07-09 NOTE — PHYSICAL EXAM
[General Appearance - Alert] : alert [Neck Appearance] : the appearance of the neck was normal [General Appearance - In No Acute Distress] : in no acute distress [Neck Cervical Mass (___cm)] : no neck mass was observed [Jugular Venous Distention Increased] : there was no jugular-venous distention [Thyroid Nodule] : there were no palpable thyroid nodules [Thyroid Diffuse Enlargement] : the thyroid was not enlarged [] : no respiratory distress [Auscultation Breath Sounds / Voice Sounds] : lungs were clear to auscultation bilaterally [Bowel Sounds] : normal bowel sounds [Abdomen Soft] : soft [Abdomen Tenderness] : non-tender

## 2019-08-29 ENCOUNTER — MOBILE ON CALL (OUTPATIENT)
Age: 84
End: 2019-08-29

## 2019-09-03 ENCOUNTER — RX RENEWAL (OUTPATIENT)
Age: 84
End: 2019-09-03

## 2019-09-03 ENCOUNTER — APPOINTMENT (OUTPATIENT)
Dept: PULMONOLOGY | Facility: CLINIC | Age: 84
End: 2019-09-03
Payer: MEDICARE

## 2019-09-03 VITALS
OXYGEN SATURATION: 98 % | HEIGHT: 58 IN | DIASTOLIC BLOOD PRESSURE: 86 MMHG | RESPIRATION RATE: 16 BRPM | SYSTOLIC BLOOD PRESSURE: 172 MMHG | HEART RATE: 77 BPM

## 2019-09-03 DIAGNOSIS — Z87.09 PERSONAL HISTORY OF OTHER DISEASES OF THE RESPIRATORY SYSTEM: ICD-10-CM

## 2019-09-03 LAB
25(OH)D3 SERPL-MCNC: 42.6 NG/ML
BASOPHILS # BLD AUTO: 0.05 K/UL
BASOPHILS NFR BLD AUTO: 0.6 %
BILIRUB UR QL STRIP: NEGATIVE
CLARITY UR: CLEAR
EOSINOPHIL # BLD AUTO: 0.15 K/UL
EOSINOPHIL NFR BLD AUTO: 1.8 %
ESTIMATED AVERAGE GLUCOSE: 114 MG/DL
GLUCOSE UR-MCNC: NEGATIVE
HBA1C MFR BLD HPLC: 5.6 %
HCG UR QL: 0.2 EU/DL
HCT VFR BLD CALC: 37.6 %
HGB BLD-MCNC: 12.3 G/DL
HGB UR QL STRIP.AUTO: NORMAL
IMM GRANULOCYTES NFR BLD AUTO: 0.4 %
KETONES UR-MCNC: NEGATIVE
LEUKOCYTE ESTERASE UR QL STRIP: NEGATIVE
LYMPHOCYTES # BLD AUTO: 2.47 K/UL
LYMPHOCYTES NFR BLD AUTO: 29.3 %
MAN DIFF?: NORMAL
MCHC RBC-ENTMCNC: 30.4 PG
MCHC RBC-ENTMCNC: 32.7 GM/DL
MCV RBC AUTO: 93.1 FL
MONOCYTES # BLD AUTO: 0.57 K/UL
MONOCYTES NFR BLD AUTO: 6.8 %
NEUTROPHILS # BLD AUTO: 5.17 K/UL
NEUTROPHILS NFR BLD AUTO: 61.1 %
NITRITE UR QL STRIP: NEGATIVE
PH UR STRIP: 7.5
PLATELET # BLD AUTO: 214 K/UL
PROT UR STRIP-MCNC: NEGATIVE
RBC # BLD: 4.04 M/UL
RBC # FLD: 14.4 %
SP GR UR STRIP: 1.01
WBC # FLD AUTO: 8.44 K/UL

## 2019-09-03 PROCEDURE — 81003 URINALYSIS AUTO W/O SCOPE: CPT | Mod: QW

## 2019-09-03 PROCEDURE — 99213 OFFICE O/P EST LOW 20 MIN: CPT | Mod: 25

## 2019-09-03 PROCEDURE — 36415 COLL VENOUS BLD VENIPUNCTURE: CPT

## 2019-09-03 RX ORDER — IMIPRAMINE HYDROCHLORIDE 10 MG/1
10 TABLET ORAL
Qty: 30 | Refills: 3 | Status: DISCONTINUED | COMMUNITY
Start: 2019-07-09 | End: 2019-09-03

## 2019-09-03 RX ORDER — CYCLOBENZAPRINE HYDROCHLORIDE 5 MG/1
5 TABLET, FILM COATED ORAL
Qty: 15 | Refills: 1 | Status: DISCONTINUED | COMMUNITY
Start: 2019-06-11 | End: 2019-09-03

## 2019-09-03 RX ORDER — CEFUROXIME AXETIL 250 MG/1
250 TABLET ORAL
Qty: 14 | Refills: 0 | Status: DISCONTINUED | COMMUNITY
Start: 2019-05-28 | End: 2019-09-03

## 2019-09-05 LAB
ALBUMIN SERPL ELPH-MCNC: 4.6 G/DL
ALP BLD-CCNC: 44 U/L
ALT SERPL-CCNC: 25 U/L
ANION GAP SERPL CALC-SCNC: 23 MMOL/L
AST SERPL-CCNC: 27 U/L
BILIRUB SERPL-MCNC: 0.3 MG/DL
BUN SERPL-MCNC: 14 MG/DL
CALCIUM SERPL-MCNC: 9.6 MG/DL
CHLORIDE SERPL-SCNC: 95 MMOL/L
CHOLEST SERPL-MCNC: 213 MG/DL
CHOLEST/HDLC SERPL: 3.1 RATIO
CO2 SERPL-SCNC: 16 MMOL/L
CREAT SERPL-MCNC: 0.97 MG/DL
GLUCOSE SERPL-MCNC: 102 MG/DL
HDLC SERPL-MCNC: 68 MG/DL
LDLC SERPL CALC-MCNC: 124 MG/DL
POTASSIUM SERPL-SCNC: 4.6 MMOL/L
PROT SERPL-MCNC: 7.3 G/DL
SODIUM SERPL-SCNC: 134 MMOL/L
T4 FREE SERPL-MCNC: 1.1 NG/DL
TRIGL SERPL-MCNC: 107 MG/DL
TSH SERPL-ACNC: 4.02 UIU/ML

## 2019-09-05 NOTE — PHYSICAL EXAM
[General Appearance - Well Developed] : well developed [Normal Appearance] : normal appearance [Well Groomed] : well groomed [General Appearance - Well Nourished] : well nourished [General Appearance - In No Acute Distress] : no acute distress [No Deformities] : no deformities [Normal Conjunctiva] : the conjunctiva exhibited no abnormalities [Eyelids - No Xanthelasma] : the eyelids demonstrated no xanthelasmas [Normal Oropharynx] : normal oropharynx [Neck Appearance] : the appearance of the neck was normal [Neck Cervical Mass (___cm)] : no neck mass was observed [Jugular Venous Distention Increased] : there was no jugular-venous distention [Thyroid Diffuse Enlargement] : the thyroid was not enlarged [Thyroid Nodule] : there were no palpable thyroid nodules [Heart Rate And Rhythm] : heart rate and rhythm were normal [Heart Sounds] : normal S1 and S2 [Murmurs] : no murmurs present [Respiration, Rhythm And Depth] : normal respiratory rhythm and effort [Abdomen Soft] : soft [Abdomen Tenderness] : non-tender [Abdomen Mass (___ Cm)] : no abdominal mass palpated [Nail Clubbing] : no clubbing of the fingernails [Cyanosis, Localized] : no localized cyanosis [Petechial Hemorrhages (___cm)] : no petechial hemorrhages [Skin Color & Pigmentation] : normal skin color and pigmentation [] : no rash [No Venous Stasis] : no venous stasis [Skin Lesions] : no skin lesions [No Xanthoma] : no  xanthoma was observed [No Skin Ulcers] : no skin ulcer [Deep Tendon Reflexes (DTR)] : deep tendon reflexes were 2+ and symmetric [Sensation] : the sensory exam was normal to light touch and pinprick [No Focal Deficits] : no focal deficits [Oriented To Time, Place, And Person] : oriented to person, place, and time [FreeTextEntry1] : normal gait [Affect] : the affect was normal [Impaired Insight] : insight and judgment were intact

## 2019-09-05 NOTE — HISTORY OF PRESENT ILLNESS
[FreeTextEntry1] : c/o worsening dizziness, no fall, no nausea or vomiting\par no headache, some pain over left eyebrow

## 2019-09-23 ENCOUNTER — MESSAGE (OUTPATIENT)
Age: 84
End: 2019-09-23

## 2019-11-01 ENCOUNTER — APPOINTMENT (OUTPATIENT)
Dept: PULMONOLOGY | Facility: CLINIC | Age: 84
End: 2019-11-01

## 2019-11-07 ENCOUNTER — FORM ENCOUNTER (OUTPATIENT)
Age: 84
End: 2019-11-07

## 2019-11-08 ENCOUNTER — APPOINTMENT (OUTPATIENT)
Dept: PULMONOLOGY | Facility: CLINIC | Age: 84
End: 2019-11-08
Payer: MEDICARE

## 2019-11-08 VITALS
SYSTOLIC BLOOD PRESSURE: 126 MMHG | RESPIRATION RATE: 16 BRPM | HEART RATE: 66 BPM | DIASTOLIC BLOOD PRESSURE: 70 MMHG | WEIGHT: 110 LBS | OXYGEN SATURATION: 98 % | HEIGHT: 58 IN | BODY MASS INDEX: 23.09 KG/M2

## 2019-11-08 DIAGNOSIS — R05 COUGH: ICD-10-CM

## 2019-11-08 PROCEDURE — 94060 EVALUATION OF WHEEZING: CPT

## 2019-11-08 PROCEDURE — 90662 IIV NO PRSV INCREASED AG IM: CPT

## 2019-11-08 PROCEDURE — 99213 OFFICE O/P EST LOW 20 MIN: CPT | Mod: 25

## 2019-11-08 PROCEDURE — G0008: CPT

## 2019-11-08 RX ORDER — ALBUTEROL SULFATE 90 UG/1
108 (90 BASE) AEROSOL, METERED RESPIRATORY (INHALATION)
Qty: 1 | Refills: 1 | Status: ACTIVE | COMMUNITY
Start: 2019-11-08 | End: 1900-01-01

## 2019-11-09 PROBLEM — R05 COUGH: Status: ACTIVE | Noted: 2019-05-28

## 2019-11-09 NOTE — REVIEW OF SYSTEMS
[Fever] : no fever [Chills] : no chills [As Noted in HPI] : as noted in HPI [Cough] : no cough [Sputum] : not coughing up ~M sputum [Back Pain] : ~T back pain [Negative] : Sleep Disorder

## 2019-11-09 NOTE — PHYSICAL EXAM
[General Appearance - Well Developed] : well developed [Normal Appearance] : normal appearance [Well Groomed] : well groomed [General Appearance - Well Nourished] : well nourished [No Deformities] : no deformities [General Appearance - In No Acute Distress] : no acute distress [Normal Conjunctiva] : the conjunctiva exhibited no abnormalities [Eyelids - No Xanthelasma] : the eyelids demonstrated no xanthelasmas [Normal Oropharynx] : normal oropharynx [Neck Appearance] : the appearance of the neck was normal [Neck Cervical Mass (___cm)] : no neck mass was observed [Jugular Venous Distention Increased] : there was no jugular-venous distention [Thyroid Diffuse Enlargement] : the thyroid was not enlarged [Thyroid Nodule] : there were no palpable thyroid nodules [Heart Sounds] : normal S1 and S2 [Heart Rate And Rhythm] : heart rate and rhythm were normal [Murmurs] : no murmurs present [Respiration, Rhythm And Depth] : normal respiratory rhythm and effort [Abdomen Soft] : soft [Abdomen Tenderness] : non-tender [Abdomen Mass (___ Cm)] : no abdominal mass palpated [Nail Clubbing] : no clubbing of the fingernails [Cyanosis, Localized] : no localized cyanosis [Petechial Hemorrhages (___cm)] : no petechial hemorrhages [Skin Color & Pigmentation] : normal skin color and pigmentation [] : no rash [No Venous Stasis] : no venous stasis [Skin Lesions] : no skin lesions [No Skin Ulcers] : no skin ulcer [Deep Tendon Reflexes (DTR)] : deep tendon reflexes were 2+ and symmetric [No Xanthoma] : no  xanthoma was observed [Sensation] : the sensory exam was normal to light touch and pinprick [No Focal Deficits] : no focal deficits [FreeTextEntry1] : normal gait [Affect] : the affect was normal [Impaired Insight] : insight and judgment were intact [Oriented To Time, Place, And Person] : oriented to person, place, and time

## 2019-11-22 ENCOUNTER — APPOINTMENT (OUTPATIENT)
Dept: ENDOCRINOLOGY | Facility: CLINIC | Age: 84
End: 2019-11-22
Payer: MEDICARE

## 2019-11-22 VITALS
HEIGHT: 58 IN | OXYGEN SATURATION: 97 % | WEIGHT: 110 LBS | HEART RATE: 78 BPM | SYSTOLIC BLOOD PRESSURE: 120 MMHG | BODY MASS INDEX: 23.09 KG/M2 | DIASTOLIC BLOOD PRESSURE: 72 MMHG

## 2019-11-22 PROCEDURE — 99214 OFFICE O/P EST MOD 30 MIN: CPT | Mod: 25

## 2019-11-22 PROCEDURE — 96401 CHEMO ANTI-NEOPL SQ/IM: CPT

## 2019-11-22 RX ORDER — DENOSUMAB 60 MG/ML
60 INJECTION SUBCUTANEOUS
Qty: 1 | Refills: 0 | Status: COMPLETED | OUTPATIENT
Start: 2019-11-22

## 2019-11-22 RX ADMIN — DENOSUMAB 0 MG/ML: 60 INJECTION SUBCUTANEOUS at 00:00

## 2019-11-22 NOTE — PHYSICAL EXAM
[Alert] : alert [No Acute Distress] : no acute distress [Well Nourished] : well nourished [Well Developed] : well developed [EOMI] : extra ocular movement intact [No Respiratory Distress] : no respiratory distress [Normal Rate and Effort] : normal respiratory rhythm and effort [No Accessory Muscle Use] : no accessory muscle use [Clear to Auscultation] : lungs were clear to auscultation bilaterally [Normal Rate] : heart rate was normal  [Normal S1, S2] : normal S1 and S2 [Regular Rhythm] : with a regular rhythm [Normal Bowel Sounds] : normal bowel sounds [Not Tender] : non-tender [Soft] : abdomen soft [Normal Gait] : normal gait [No Joint Swelling] : no joint swelling seen [No Motor Deficits] : the motor exam was normal [No Tremors] : no tremors [Normal Affect] : the affect was normal [Normal Mood] : the mood was normal [Murmurs] : no murmurs [Kyphosis] : kyphosis present [de-identified] : Patient with systolic murmur

## 2019-11-22 NOTE — REVIEW OF SYSTEMS
[Fatigue] : no fatigue [Decreased Appetite] : appetite not decreased [Negative] : Constitutional [All other systems negative] : All other systems negative

## 2019-11-22 NOTE — ASSESSMENT
[FreeTextEntry1] : Patient is a 89 yo woman with osteoporosis here for follow up\par \par 1. Osteoporosis\par -patient had surveillance DXA May 2019. The spine region of interest is incomparable compared to last year.  She also has arthritic changes. Total hip T score improved, femoral neck T score is stable\par -no recent falls\par -no fractures\par -no plans for major dental work\par -tolerating prolia well, provided dose today\par -continue with fall precautions\par \par Return in six months for PRL [Denosumab Therapy] : Risks  and benefits of denosumab therapy were discussed with the patient including eczema, cellulitis, osteonecrosis of the jaw and atypical femur fractures

## 2019-11-22 NOTE — HISTORY OF PRESENT ILLNESS
[FreeTextEntry1] : 89-year-old woman here for follow up of osteoporosis\par \par She took bisphosphonate in the past for about 9 years then was on drug holiday for 4-5 years. The patient was then prescribed 2 doses of prolia. Due to insurance issues, she had to stop prolia and was ultimately switched to Fosamax in November 2018 \par Per Dr. Mahajan's notes, the patient also had some sort of dental issue with teeth while she was on prolia. \par Patient clarifies and states that her dentist refused to do dental work while on anti-resorptives but denies any ONJ\par No interval fractures, no falls\par She takes calcium and vitamin D\par History of wrist fx 30 years ago\par Had dental implant work that were completed five years ago\par Patient is undergoing further diagnostic work up for ovarian cysts/polyps\par Has 2 sons 5 grandchildren\par Lives alone and is very active \par Restarted prolia May 2019

## 2019-12-06 ENCOUNTER — APPOINTMENT (OUTPATIENT)
Dept: GYNECOLOGIC ONCOLOGY | Facility: CLINIC | Age: 84
End: 2019-12-06

## 2020-01-03 ENCOUNTER — APPOINTMENT (OUTPATIENT)
Dept: PULMONOLOGY | Facility: CLINIC | Age: 85
End: 2020-01-03

## 2020-05-26 ENCOUNTER — APPOINTMENT (OUTPATIENT)
Dept: ENDOCRINOLOGY | Facility: CLINIC | Age: 85
End: 2020-05-26

## 2020-07-07 ENCOUNTER — LABORATORY RESULT (OUTPATIENT)
Age: 85
End: 2020-07-07

## 2020-07-07 ENCOUNTER — NON-APPOINTMENT (OUTPATIENT)
Age: 85
End: 2020-07-07

## 2020-07-07 ENCOUNTER — APPOINTMENT (OUTPATIENT)
Dept: PULMONOLOGY | Facility: CLINIC | Age: 85
End: 2020-07-07
Payer: MEDICARE

## 2020-07-07 VITALS
OXYGEN SATURATION: 96 % | TEMPERATURE: 98.4 F | HEART RATE: 74 BPM | SYSTOLIC BLOOD PRESSURE: 160 MMHG | DIASTOLIC BLOOD PRESSURE: 80 MMHG

## 2020-07-07 LAB
ALBUMIN: 30
BILIRUB UR QL STRIP: NEGATIVE
CLARITY UR: CLEAR
COLLECTION METHOD: NORMAL
CREATININE: 10
GLUCOSE UR-MCNC: NEGATIVE
HCG UR QL: 0.2 EU/DL
HGB UR QL STRIP.AUTO: NEGATIVE
KETONES UR-MCNC: NEGATIVE
LEUKOCYTE ESTERASE UR QL STRIP: NORMAL
MICROALBUMIN/CREAT UR TEST STR-RTO: NORMAL
NITRITE UR QL STRIP: NEGATIVE
PH UR STRIP: 6.5
PROT UR STRIP-MCNC: NEGATIVE
SP GR UR STRIP: 1.01

## 2020-07-07 PROCEDURE — 82044 UR ALBUMIN SEMIQUANTITATIVE: CPT | Mod: QW

## 2020-07-07 PROCEDURE — 99214 OFFICE O/P EST MOD 30 MIN: CPT | Mod: 25

## 2020-07-07 PROCEDURE — 81003 URINALYSIS AUTO W/O SCOPE: CPT | Mod: QW

## 2020-07-07 PROCEDURE — 36415 COLL VENOUS BLD VENIPUNCTURE: CPT

## 2020-07-07 RX ORDER — FLUTICASONE PROPIONATE 50 UG/1
50 SPRAY, METERED NASAL
Qty: 48 | Refills: 5 | Status: DISCONTINUED | COMMUNITY
Start: 2019-09-03 | End: 2020-07-07

## 2020-07-07 RX ORDER — FLUTICASONE PROPIONATE 50 UG/1
50 SPRAY, METERED NASAL
Qty: 1 | Refills: 3 | Status: DISCONTINUED | COMMUNITY
Start: 2019-05-28 | End: 2020-07-07

## 2020-07-07 RX ORDER — SIMETHICONE 125 MG/1
125 TABLET, CHEWABLE ORAL
Qty: 100 | Refills: 5 | Status: DISCONTINUED | COMMUNITY
Start: 2019-02-25 | End: 2020-07-07

## 2020-07-07 RX ORDER — CEFUROXIME AXETIL 500 MG/1
500 TABLET ORAL
Qty: 20 | Refills: 0 | Status: DISCONTINUED | COMMUNITY
Start: 2019-09-03 | End: 2020-07-07

## 2020-07-07 NOTE — PHYSICAL EXAM
[Normal Oropharynx] : normal oropharynx [No Acute Distress] : no acute distress [Normal Appearance] : normal appearance [Normal Rate/Rhythm] : normal rate/rhythm [No Neck Mass] : no neck mass [Normal S1, S2] : normal s1, s2 [No Murmurs] : no murmurs [No Resp Distress] : no resp distress [No Abnormalities] : no abnormalities [Clear to Auscultation Bilaterally] : clear to auscultation bilaterally [Benign] : benign [Normal Gait] : normal gait [No Clubbing] : no clubbing [No Cyanosis] : no cyanosis [No Edema] : no edema [No Focal Deficits] : no focal deficits [Normal Color/ Pigmentation] : normal color/ pigmentation [FROM] : FROM [Oriented x3] : oriented x3 [Normal Affect] : normal affect

## 2020-07-09 LAB
25(OH)D3 SERPL-MCNC: 51.3 NG/ML
ALBUMIN SERPL ELPH-MCNC: 4.3 G/DL
ALP BLD-CCNC: 41 U/L
ALT SERPL-CCNC: 16 U/L
ANION GAP SERPL CALC-SCNC: 14 MMOL/L
AST SERPL-CCNC: 25 U/L
BASOPHILS # BLD AUTO: 0.04 K/UL
BASOPHILS NFR BLD AUTO: 0.6 %
BILIRUB SERPL-MCNC: 0.2 MG/DL
BUN SERPL-MCNC: 17 MG/DL
CALCIUM SERPL-MCNC: 9.7 MG/DL
CHLORIDE SERPL-SCNC: 94 MMOL/L
CHOLEST SERPL-MCNC: 195 MG/DL
CHOLEST/HDLC SERPL: 3.7 RATIO
CO2 SERPL-SCNC: 24 MMOL/L
CREAT SERPL-MCNC: 1.32 MG/DL
EOSINOPHIL # BLD AUTO: 0.19 K/UL
EOSINOPHIL NFR BLD AUTO: 2.7 %
GLUCOSE SERPL-MCNC: 104 MG/DL
HCT VFR BLD CALC: 35.2 %
HCV AB SER QL: NONREACTIVE
HCV S/CO RATIO: 0.32 S/CO
HDLC SERPL-MCNC: 54 MG/DL
HGB BLD-MCNC: 11.3 G/DL
IMM GRANULOCYTES NFR BLD AUTO: 0.3 %
LDLC SERPL CALC-MCNC: 117 MG/DL
LYMPHOCYTES # BLD AUTO: 2.63 K/UL
LYMPHOCYTES NFR BLD AUTO: 37.5 %
MAN DIFF?: NORMAL
MCHC RBC-ENTMCNC: 30.3 PG
MCHC RBC-ENTMCNC: 32.1 GM/DL
MCV RBC AUTO: 94.4 FL
MONOCYTES # BLD AUTO: 0.44 K/UL
MONOCYTES NFR BLD AUTO: 6.3 %
NEUTROPHILS # BLD AUTO: 3.7 K/UL
NEUTROPHILS NFR BLD AUTO: 52.6 %
PLATELET # BLD AUTO: 178 K/UL
POTASSIUM SERPL-SCNC: 4.4 MMOL/L
PROT SERPL-MCNC: 6.8 G/DL
RBC # BLD: 3.73 M/UL
RBC # FLD: 13.4 %
SODIUM SERPL-SCNC: 132 MMOL/L
T3 SERPL-MCNC: 89 NG/DL
T3RU NFR SERPL: 1.1 TBI
T4 FREE SERPL-MCNC: 1 NG/DL
T4 SERPL-MCNC: 5.5 UG/DL
TRIGL SERPL-MCNC: 122 MG/DL
TSH SERPL-ACNC: 8.29 UIU/ML
WBC # FLD AUTO: 7.02 K/UL

## 2020-07-09 NOTE — REVIEW OF SYSTEMS
[Recent Wt Loss (___ Lbs)] : ~T recent [unfilled] lb weight loss [Negative] : Endocrine [TextBox_69] : bloated

## 2020-07-09 NOTE — ASSESSMENT
[FreeTextEntry1] : Overall stable but blood pressure elevated.  Start medication patient advised to check blood pressure at home\par Check labs

## 2020-08-03 ENCOUNTER — RX RENEWAL (OUTPATIENT)
Age: 85
End: 2020-08-03

## 2020-08-03 ENCOUNTER — APPOINTMENT (OUTPATIENT)
Dept: PULMONOLOGY | Facility: CLINIC | Age: 85
End: 2020-08-03
Payer: MEDICARE

## 2020-08-03 VITALS
OXYGEN SATURATION: 96 % | TEMPERATURE: 98 F | HEART RATE: 70 BPM | RESPIRATION RATE: 15 BRPM | SYSTOLIC BLOOD PRESSURE: 160 MMHG | DIASTOLIC BLOOD PRESSURE: 80 MMHG

## 2020-08-03 PROCEDURE — 36415 COLL VENOUS BLD VENIPUNCTURE: CPT

## 2020-08-03 PROCEDURE — 99214 OFFICE O/P EST MOD 30 MIN: CPT | Mod: 25

## 2020-08-04 LAB
ALBUMIN SERPL ELPH-MCNC: 4.3 G/DL
ALP BLD-CCNC: 38 U/L
ALT SERPL-CCNC: 15 U/L
ANION GAP SERPL CALC-SCNC: 12 MMOL/L
AST SERPL-CCNC: 25 U/L
BASOPHILS # BLD AUTO: 0.06 K/UL
BASOPHILS NFR BLD AUTO: 0.8 %
BILIRUB SERPL-MCNC: 0.2 MG/DL
BUN SERPL-MCNC: 17 MG/DL
CALCIUM SERPL-MCNC: 9.4 MG/DL
CHLORIDE SERPL-SCNC: 96 MMOL/L
CO2 SERPL-SCNC: 24 MMOL/L
CREAT SERPL-MCNC: 1.05 MG/DL
EOSINOPHIL # BLD AUTO: 0.17 K/UL
EOSINOPHIL NFR BLD AUTO: 2.3 %
FERRITIN SERPL-MCNC: 166 NG/ML
FOLATE SERPL-MCNC: >20 NG/ML
GLUCOSE SERPL-MCNC: 103 MG/DL
HCT VFR BLD CALC: 35.1 %
HGB BLD-MCNC: 11.5 G/DL
IMM GRANULOCYTES NFR BLD AUTO: 0.3 %
IRON SATN MFR SERPL: 30 %
IRON SERPL-MCNC: 86 UG/DL
LYMPHOCYTES # BLD AUTO: 2.59 K/UL
LYMPHOCYTES NFR BLD AUTO: 35.6 %
MAN DIFF?: NORMAL
MCHC RBC-ENTMCNC: 30.6 PG
MCHC RBC-ENTMCNC: 32.8 GM/DL
MCV RBC AUTO: 93.4 FL
MONOCYTES # BLD AUTO: 0.51 K/UL
MONOCYTES NFR BLD AUTO: 7 %
NEUTROPHILS # BLD AUTO: 3.92 K/UL
NEUTROPHILS NFR BLD AUTO: 54 %
PLATELET # BLD AUTO: 199 K/UL
POTASSIUM SERPL-SCNC: 4.8 MMOL/L
PROT SERPL-MCNC: 6.5 G/DL
RBC # BLD: 3.76 M/UL
RBC # FLD: 13.5 %
SODIUM SERPL-SCNC: 131 MMOL/L
TIBC SERPL-MCNC: 285 UG/DL
UIBC SERPL-MCNC: 199 UG/DL
VIT B12 SERPL-MCNC: 977 PG/ML
WBC # FLD AUTO: 7.27 K/UL

## 2020-08-04 NOTE — PHYSICAL EXAM
[No Acute Distress] : no acute distress [Normal Oropharynx] : normal oropharynx [Normal Appearance] : normal appearance [No Neck Mass] : no neck mass [Normal Rate/Rhythm] : normal rate/rhythm [No Murmurs] : no murmurs [Normal S1, S2] : normal s1, s2 [No Resp Distress] : no resp distress [Clear to Auscultation Bilaterally] : clear to auscultation bilaterally [No Abnormalities] : no abnormalities [Benign] : benign [Normal Gait] : normal gait [No Clubbing] : no clubbing [No Cyanosis] : no cyanosis [No Edema] : no edema [FROM] : FROM [Normal Color/ Pigmentation] : normal color/ pigmentation [Oriented x3] : oriented x3 [No Focal Deficits] : no focal deficits [Normal Affect] : normal affect

## 2020-08-04 NOTE — REVIEW OF SYSTEMS
[Recent Wt Loss (___ Lbs)] : ~T recent [unfilled] lb weight loss [TextBox_69] : bloated [Negative] : Endocrine

## 2020-08-04 NOTE — ASSESSMENT
[FreeTextEntry1] : Increase amlodipine to 5 mg daily\par Continue to monitor blood pressure at home although I am suspicious about the data from the monitor at least it could be used for trending\par Anemia improved on labs\par Chronic low-grade hyponatremia will monitor\par

## 2020-08-04 NOTE — HISTORY OF PRESENT ILLNESS
[Never] : never [TextBox_4] : Follow-up for high blood pressure and anemia\par At last office visit was noted to be mildly anemic and to be hypertensive\par Started on low-dose amlodipine\par Blood pressure remained elevated on home measurement

## 2020-08-05 LAB
ALBUMIN MFR SERPL ELPH: 57.6 %
ALBUMIN SERPL-MCNC: 3.7 G/DL
ALBUMIN/GLOB SERPL: 1.3 RATIO
ALPHA1 GLOB MFR SERPL ELPH: 3.4 %
ALPHA1 GLOB SERPL ELPH-MCNC: 0.2 G/DL
ALPHA2 GLOB MFR SERPL ELPH: 13.5 %
ALPHA2 GLOB SERPL ELPH-MCNC: 0.9 G/DL
B-GLOBULIN MFR SERPL ELPH: 12 %
B-GLOBULIN SERPL ELPH-MCNC: 0.8 G/DL
GAMMA GLOB FLD ELPH-MCNC: 0.9 G/DL
GAMMA GLOB MFR SERPL ELPH: 13.5 %
INTERPRETATION SERPL IEP-IMP: NORMAL
PROT SERPL-MCNC: 6.5 G/DL
PROT SERPL-MCNC: 6.5 G/DL

## 2020-09-08 ENCOUNTER — APPOINTMENT (OUTPATIENT)
Dept: PULMONOLOGY | Facility: CLINIC | Age: 85
End: 2020-09-08
Payer: MEDICARE

## 2020-09-08 VITALS
SYSTOLIC BLOOD PRESSURE: 110 MMHG | HEART RATE: 78 BPM | OXYGEN SATURATION: 97 % | TEMPERATURE: 97.9 F | DIASTOLIC BLOOD PRESSURE: 80 MMHG

## 2020-09-08 DIAGNOSIS — Z23 ENCOUNTER FOR IMMUNIZATION: ICD-10-CM

## 2020-09-08 PROCEDURE — 99213 OFFICE O/P EST LOW 20 MIN: CPT | Mod: 25

## 2020-09-08 PROCEDURE — 90662 IIV NO PRSV INCREASED AG IM: CPT

## 2020-09-08 PROCEDURE — G0008: CPT

## 2020-09-08 RX ORDER — AMLODIPINE BESYLATE 2.5 MG/1
2.5 TABLET ORAL DAILY
Qty: 90 | Refills: 0 | Status: COMPLETED | COMMUNITY
Start: 2020-07-07 | End: 2020-09-08

## 2020-09-09 PROBLEM — Z23 ENCOUNTER FOR IMMUNIZATION: Status: ACTIVE | Noted: 2019-11-09

## 2020-09-09 NOTE — ASSESSMENT
[FreeTextEntry1] : 89 y/o f with now controlled HTN  with Norvasc 5 mg\par Will need follow-up bone density

## 2020-09-09 NOTE — HISTORY OF PRESENT ILLNESS
[Never] : never [TextBox_4] : [place] 89 year female  came to the office today offered cc for f/u HTN \par now at goal \par adherent to Norvasc 5mg  \par  ongoing issues with intermittent constipation and diarrhea.\par

## 2020-09-09 NOTE — PHYSICAL EXAM
[Normal Oropharynx] : normal oropharynx [No Acute Distress] : no acute distress [Normal Rate/Rhythm] : normal rate/rhythm [Normal Appearance] : normal appearance [No Neck Mass] : no neck mass [No Murmurs] : no murmurs [Clear to Auscultation Bilaterally] : clear to auscultation bilaterally [Normal S1, S2] : normal s1, s2 [No Resp Distress] : no resp distress [No Abnormalities] : no abnormalities [No Edema] : no edema [No Cyanosis] : no cyanosis [No Clubbing] : no clubbing [Normal Gait] : normal gait [FROM] : FROM [Normal Color/ Pigmentation] : normal color/ pigmentation [No Focal Deficits] : no focal deficits [Normal Affect] : normal affect [Oriented x3] : oriented x3 [TextBox_89] : Distended with flatus noted

## 2020-09-11 RX ORDER — CHLORDIAZEPOXIDE HYDROCHLORIDE AND CLIDINIUM BROMIDE 5; 2.5 MG/1; MG/1
5-2.5 CAPSULE, GELATIN COATED ORAL
Qty: 30 | Refills: 5 | Status: ACTIVE | COMMUNITY
Start: 2019-05-02 | End: 1900-01-01

## 2020-10-16 ENCOUNTER — APPOINTMENT (OUTPATIENT)
Dept: PULMONOLOGY | Facility: CLINIC | Age: 85
End: 2020-10-16

## 2020-11-10 ENCOUNTER — APPOINTMENT (OUTPATIENT)
Dept: PULMONOLOGY | Facility: CLINIC | Age: 85
End: 2020-11-10
Payer: MEDICARE

## 2020-11-10 VITALS — SYSTOLIC BLOOD PRESSURE: 150 MMHG | DIASTOLIC BLOOD PRESSURE: 80 MMHG

## 2020-11-10 VITALS
OXYGEN SATURATION: 98 % | HEART RATE: 87 BPM | TEMPERATURE: 98 F | BODY MASS INDEX: 23.09 KG/M2 | SYSTOLIC BLOOD PRESSURE: 180 MMHG | HEIGHT: 58 IN | DIASTOLIC BLOOD PRESSURE: 84 MMHG | WEIGHT: 110 LBS

## 2020-11-10 PROCEDURE — 77085 DXA BONE DENSITY AXL VRT FX: CPT

## 2020-11-10 PROCEDURE — 99072 ADDL SUPL MATRL&STAF TM PHE: CPT

## 2020-11-10 PROCEDURE — 99213 OFFICE O/P EST LOW 20 MIN: CPT | Mod: 25

## 2020-11-12 NOTE — PHYSICAL EXAM
[No Acute Distress] : no acute distress [Normal Oropharynx] : normal oropharynx [Normal Appearance] : normal appearance [No Neck Mass] : no neck mass [Normal Rate/Rhythm] : normal rate/rhythm [Normal S1, S2] : normal s1, s2 [No Murmurs] : no murmurs [No Resp Distress] : no resp distress [Clear to Auscultation Bilaterally] : clear to auscultation bilaterally [No Abnormalities] : no abnormalities [Normal Gait] : normal gait [No Clubbing] : no clubbing [No Cyanosis] : no cyanosis [No Edema] : no edema [FROM] : FROM [Normal Color/ Pigmentation] : normal color/ pigmentation [No Focal Deficits] : no focal deficits [Oriented x3] : oriented x3 [Normal Affect] : normal affect [TextBox_89] : Distended with flatus noted

## 2020-11-12 NOTE — HISTORY OF PRESENT ILLNESS
[Never] : never [TextBox_4] : [place] 89 year female  came to the office today offered cc for f/u HTN \par now at goal \par adherent to Norvasc 5mg  \par \par For bone density as is on Prolia\par ongoing issues with intermittent constipation and diarrhea in control\par

## 2020-11-13 ENCOUNTER — APPOINTMENT (OUTPATIENT)
Dept: ENDOCRINOLOGY | Facility: CLINIC | Age: 85
End: 2020-11-13
Payer: MEDICARE

## 2020-11-13 VITALS — WEIGHT: 110 LBS | HEIGHT: 58 IN | TEMPERATURE: 97.4 F | BODY MASS INDEX: 23.09 KG/M2

## 2020-11-13 PROCEDURE — 99213 OFFICE O/P EST LOW 20 MIN: CPT | Mod: 25

## 2020-11-13 PROCEDURE — 99072 ADDL SUPL MATRL&STAF TM PHE: CPT

## 2020-11-13 PROCEDURE — 96401 CHEMO ANTI-NEOPL SQ/IM: CPT

## 2020-11-13 RX ORDER — DENOSUMAB 60 MG/ML
60 INJECTION SUBCUTANEOUS
Qty: 1 | Refills: 0 | Status: COMPLETED | OUTPATIENT
Start: 2020-11-13

## 2020-11-13 RX ADMIN — DENOSUMAB 0 MG/ML: 60 INJECTION SUBCUTANEOUS at 00:00

## 2020-11-13 NOTE — ASSESSMENT
[FreeTextEntry1] : Patient is a 89-year-old female here to follow-up for osteoporosis\par \par 1.  Osteoporosis\par Patient was on Fosamax in the past, was discontinued in the setting of IBS, constipation type.  It is unlikely the cause of constipation but she prefers to stay off the medication now.  Patient was restarted on Prolia on May 2019.  She received another dose in November 2019.\par Secondary to the Covid pandemic, patient missed her 6-month dosing in May 2020.\par Patient received her dose today, about 5 months overdue.\par \par Discussed the risks of ONJ and atypical femur fracture.\par Patient had a recent bone density done with Dr. Macias.  We will try to locate the records in the last 6 months.\par \par Patient's own med from pharmacy.

## 2020-11-13 NOTE — PHYSICAL EXAM
[Well Nourished] : well nourished [No Lid Lag] : no lid lag [No LAD] : no lymphadenopathy [Normal Bowel Sounds] : normal bowel sounds [Not Tender] : non-tender [Kyphosis] : kyphosis present [Normal Gait] : normal gait

## 2020-12-21 PROBLEM — Z87.09 HISTORY OF ACUTE SINUSITIS: Status: RESOLVED | Noted: 2019-09-05 | Resolved: 2020-12-21

## 2020-12-21 PROBLEM — Z87.09 HISTORY OF ACUTE BRONCHITIS: Status: RESOLVED | Noted: 2019-05-28 | Resolved: 2020-12-21

## 2021-01-14 ENCOUNTER — LABORATORY RESULT (OUTPATIENT)
Age: 86
End: 2021-01-14

## 2021-01-14 ENCOUNTER — APPOINTMENT (OUTPATIENT)
Dept: PULMONOLOGY | Facility: CLINIC | Age: 86
End: 2021-01-14
Payer: MEDICARE

## 2021-01-14 VITALS
OXYGEN SATURATION: 98 % | SYSTOLIC BLOOD PRESSURE: 160 MMHG | HEART RATE: 76 BPM | BODY MASS INDEX: 22.36 KG/M2 | WEIGHT: 107 LBS | TEMPERATURE: 97.9 F | DIASTOLIC BLOOD PRESSURE: 86 MMHG

## 2021-01-14 VITALS — SYSTOLIC BLOOD PRESSURE: 140 MMHG | DIASTOLIC BLOOD PRESSURE: 82 MMHG

## 2021-01-14 DIAGNOSIS — R63.4 ABNORMAL WEIGHT LOSS: ICD-10-CM

## 2021-01-14 PROCEDURE — 99213 OFFICE O/P EST LOW 20 MIN: CPT | Mod: 25

## 2021-01-14 PROCEDURE — 99072 ADDL SUPL MATRL&STAF TM PHE: CPT

## 2021-01-14 PROCEDURE — 36415 COLL VENOUS BLD VENIPUNCTURE: CPT

## 2021-01-14 NOTE — PHYSICAL EXAM
[No Acute Distress] : no acute distress [Normal Oropharynx] : normal oropharynx [Normal Appearance] : normal appearance [No Neck Mass] : no neck mass [Normal Rate/Rhythm] : normal rate/rhythm [Normal S1, S2] : normal s1, s2 [No Resp Distress] : no resp distress [No Murmurs] : no murmurs [Clear to Auscultation Bilaterally] : clear to auscultation bilaterally [No Abnormalities] : no abnormalities [Normal Gait] : normal gait [No Clubbing] : no clubbing [No Cyanosis] : no cyanosis [No Edema] : no edema [FROM] : FROM [Normal Color/ Pigmentation] : normal color/ pigmentation [No Focal Deficits] : no focal deficits [Oriented x3] : oriented x3 [Normal Affect] : normal affect [TextBox_89] : Distended with flatus noted

## 2021-01-14 NOTE — HISTORY OF PRESENT ILLNESS
[Never] : never [TextBox_4] : Follow-up for hypertension concerned about weight loss appetite fair not taking any nutritional supplements

## 2021-01-14 NOTE — ASSESSMENT
[FreeTextEntry1] : Patient concerned about weight loss recommended increasing p.o. intake with dietary supplement such as Ensure.  Unless labs abnormal will defer extensive work-up given age

## 2021-01-18 LAB
ALBUMIN SERPL ELPH-MCNC: 4.4 G/DL
ALP BLD-CCNC: 49 U/L
ALT SERPL-CCNC: 21 U/L
ANION GAP SERPL CALC-SCNC: 14 MMOL/L
AST SERPL-CCNC: 26 U/L
BASOPHILS # BLD AUTO: 0.05 K/UL
BASOPHILS NFR BLD AUTO: 0.7 %
BILIRUB SERPL-MCNC: 0.2 MG/DL
BUN SERPL-MCNC: 12 MG/DL
CALCIUM SERPL-MCNC: 9.8 MG/DL
CHLORIDE SERPL-SCNC: 96 MMOL/L
CHOLEST SERPL-MCNC: 220 MG/DL
CO2 SERPL-SCNC: 23 MMOL/L
CREAT SERPL-MCNC: 1.21 MG/DL
EOSINOPHIL # BLD AUTO: 0.22 K/UL
EOSINOPHIL NFR BLD AUTO: 3 %
GLUCOSE SERPL-MCNC: 110 MG/DL
HCT VFR BLD CALC: 38.5 %
HDLC SERPL-MCNC: 78 MG/DL
HGB BLD-MCNC: 12.2 G/DL
IMM GRANULOCYTES NFR BLD AUTO: 0.1 %
LDLC SERPL CALC-MCNC: 125 MG/DL
LYMPHOCYTES # BLD AUTO: 2.81 K/UL
LYMPHOCYTES NFR BLD AUTO: 37.9 %
MAN DIFF?: NORMAL
MCHC RBC-ENTMCNC: 30.1 PG
MCHC RBC-ENTMCNC: 31.7 GM/DL
MCV RBC AUTO: 95.1 FL
MONOCYTES # BLD AUTO: 0.51 K/UL
MONOCYTES NFR BLD AUTO: 6.9 %
NEUTROPHILS # BLD AUTO: 3.81 K/UL
NEUTROPHILS NFR BLD AUTO: 51.4 %
NONHDLC SERPL-MCNC: 142 MG/DL
PLATELET # BLD AUTO: 191 K/UL
POTASSIUM SERPL-SCNC: 4.4 MMOL/L
PREALB SERPL NEPH-MCNC: 26 MG/DL
PROT SERPL-MCNC: 7.1 G/DL
RBC # BLD: 4.05 M/UL
RBC # FLD: 13.9 %
SODIUM SERPL-SCNC: 133 MMOL/L
TRIGL SERPL-MCNC: 84 MG/DL
TSH SERPL-ACNC: 4.28 UIU/ML
WBC # FLD AUTO: 7.41 K/UL

## 2021-04-27 ENCOUNTER — APPOINTMENT (OUTPATIENT)
Dept: PULMONOLOGY | Facility: CLINIC | Age: 86
End: 2021-04-27
Payer: MEDICARE

## 2021-04-27 VITALS
SYSTOLIC BLOOD PRESSURE: 152 MMHG | OXYGEN SATURATION: 93 % | TEMPERATURE: 97.3 F | DIASTOLIC BLOOD PRESSURE: 78 MMHG | HEART RATE: 83 BPM

## 2021-04-27 DIAGNOSIS — R06.02 SHORTNESS OF BREATH: ICD-10-CM

## 2021-04-27 PROCEDURE — 99213 OFFICE O/P EST LOW 20 MIN: CPT | Mod: 25

## 2021-04-27 PROCEDURE — 99072 ADDL SUPL MATRL&STAF TM PHE: CPT

## 2021-04-27 PROCEDURE — 71046 X-RAY EXAM CHEST 2 VIEWS: CPT

## 2021-04-29 LAB
25(OH)D3 SERPL-MCNC: 48.6 NG/ML
ALBUMIN SERPL ELPH-MCNC: 4.6 G/DL
ALP BLD-CCNC: 56 U/L
ALT SERPL-CCNC: 16 U/L
ANION GAP SERPL CALC-SCNC: 15 MMOL/L
AST SERPL-CCNC: 25 U/L
BASOPHILS # BLD AUTO: 0.04 K/UL
BASOPHILS NFR BLD AUTO: 0.4 %
BILIRUB SERPL-MCNC: 0.3 MG/DL
BUN SERPL-MCNC: 13 MG/DL
CALCIUM SERPL-MCNC: 10.3 MG/DL
CHLORIDE SERPL-SCNC: 98 MMOL/L
CHOLEST SERPL-MCNC: 222 MG/DL
CO2 SERPL-SCNC: 26 MMOL/L
CREAT SERPL-MCNC: 1.11 MG/DL
EOSINOPHIL # BLD AUTO: 0.2 K/UL
EOSINOPHIL NFR BLD AUTO: 2 %
GLUCOSE SERPL-MCNC: 102 MG/DL
HCT VFR BLD CALC: 38 %
HDLC SERPL-MCNC: 66 MG/DL
HGB BLD-MCNC: 12.3 G/DL
IMM GRANULOCYTES NFR BLD AUTO: 0.2 %
LDLC SERPL CALC-MCNC: 132 MG/DL
LYMPHOCYTES # BLD AUTO: 2.31 K/UL
LYMPHOCYTES NFR BLD AUTO: 23.4 %
MAN DIFF?: NORMAL
MCHC RBC-ENTMCNC: 30.1 PG
MCHC RBC-ENTMCNC: 32.4 GM/DL
MCV RBC AUTO: 93.1 FL
MONOCYTES # BLD AUTO: 0.64 K/UL
MONOCYTES NFR BLD AUTO: 6.5 %
NEUTROPHILS # BLD AUTO: 6.66 K/UL
NEUTROPHILS NFR BLD AUTO: 67.5 %
NONHDLC SERPL-MCNC: 155 MG/DL
NT-PROBNP SERPL-MCNC: 615 PG/ML
PLATELET # BLD AUTO: 211 K/UL
POTASSIUM SERPL-SCNC: 4.5 MMOL/L
PROT SERPL-MCNC: 7.6 G/DL
RBC # BLD: 4.08 M/UL
RBC # FLD: 14 %
SODIUM SERPL-SCNC: 138 MMOL/L
TRIGL SERPL-MCNC: 115 MG/DL
TSH SERPL-ACNC: 4.22 UIU/ML
WBC # FLD AUTO: 9.87 K/UL

## 2021-04-29 NOTE — HISTORY OF PRESENT ILLNESS
[Never] : never [TextBox_4] : Follow-up for hypertension. Issues with balance\par \par also some SOB

## 2021-04-29 NOTE — ASSESSMENT
[FreeTextEntry1] : Overall appears well.  I am concerned about fall risk considering her age.  She is reluctant to use a walker.  I think a physical therapy evaluation and walker training would be useful.

## 2021-05-11 ENCOUNTER — RX RENEWAL (OUTPATIENT)
Age: 86
End: 2021-05-11

## 2021-05-11 DIAGNOSIS — R60.9 EDEMA, UNSPECIFIED: ICD-10-CM

## 2021-06-10 ENCOUNTER — APPOINTMENT (OUTPATIENT)
Dept: PULMONOLOGY | Facility: CLINIC | Age: 86
End: 2021-06-10
Payer: MEDICARE

## 2021-06-10 VITALS
TEMPERATURE: 97 F | OXYGEN SATURATION: 96 % | DIASTOLIC BLOOD PRESSURE: 86 MMHG | HEART RATE: 75 BPM | SYSTOLIC BLOOD PRESSURE: 174 MMHG

## 2021-06-10 VITALS — HEART RATE: 70 BPM | SYSTOLIC BLOOD PRESSURE: 130 MMHG | DIASTOLIC BLOOD PRESSURE: 90 MMHG

## 2021-06-10 DIAGNOSIS — I10 ESSENTIAL (PRIMARY) HYPERTENSION: ICD-10-CM

## 2021-06-10 DIAGNOSIS — R42 DIZZINESS AND GIDDINESS: ICD-10-CM

## 2021-06-10 DIAGNOSIS — R26.89 OTHER ABNORMALITIES OF GAIT AND MOBILITY: ICD-10-CM

## 2021-06-10 PROCEDURE — 99214 OFFICE O/P EST MOD 30 MIN: CPT

## 2021-06-10 NOTE — HISTORY OF PRESENT ILLNESS
[TextBox_4] : Patient here to follow-up multiple issues.  Blood pressure has been erratic.  Became dizzy after recent new blood pressure medication but this resolved back to baseline.  Ongoing issues with constipation.  No shortness of breath.  Having ongoing constipation problems despite multiple OTC meds and prune juice.

## 2021-06-10 NOTE — ASSESSMENT
[FreeTextEntry1] : Continue current medications.\par Add senna for constipation\par Recommend referral to rehab for balance training and avoidance of falls.\par Does have chronically elevated TSH which I have followed as subclinical hypothyroidism we will recheck TSH and if rising consider trial of thyroid supplementation for chronic constipation\par \par \par

## 2021-06-11 LAB
ALBUMIN SERPL ELPH-MCNC: 4.5 G/DL
ALP BLD-CCNC: 53 U/L
ALT SERPL-CCNC: 15 U/L
ANION GAP SERPL CALC-SCNC: 12 MMOL/L
AST SERPL-CCNC: 25 U/L
BASOPHILS # BLD AUTO: 0.06 K/UL
BASOPHILS NFR BLD AUTO: 0.8 %
BILIRUB SERPL-MCNC: 0.2 MG/DL
BUN SERPL-MCNC: 13 MG/DL
CALCIUM SERPL-MCNC: 9.5 MG/DL
CHLORIDE SERPL-SCNC: 85 MMOL/L
CO2 SERPL-SCNC: 28 MMOL/L
CREAT SERPL-MCNC: 1.03 MG/DL
EOSINOPHIL # BLD AUTO: 0.19 K/UL
EOSINOPHIL NFR BLD AUTO: 2.5 %
GLUCOSE SERPL-MCNC: 101 MG/DL
HCT VFR BLD CALC: 34.7 %
HGB BLD-MCNC: 11.4 G/DL
IMM GRANULOCYTES NFR BLD AUTO: 0.3 %
LYMPHOCYTES # BLD AUTO: 2.71 K/UL
LYMPHOCYTES NFR BLD AUTO: 36.3 %
MAN DIFF?: NORMAL
MCHC RBC-ENTMCNC: 29.8 PG
MCHC RBC-ENTMCNC: 32.9 GM/DL
MCV RBC AUTO: 90.6 FL
MONOCYTES # BLD AUTO: 0.75 K/UL
MONOCYTES NFR BLD AUTO: 10 %
NEUTROPHILS # BLD AUTO: 3.74 K/UL
NEUTROPHILS NFR BLD AUTO: 50.1 %
PLATELET # BLD AUTO: 217 K/UL
POTASSIUM SERPL-SCNC: 3.6 MMOL/L
PROT SERPL-MCNC: 7 G/DL
RBC # BLD: 3.83 M/UL
RBC # FLD: 12.7 %
SODIUM SERPL-SCNC: 125 MMOL/L
T3FREE SERPL-MCNC: 2.5 PG/ML
T4 FREE SERPL-MCNC: 1.3 NG/DL
T4 SERPL-MCNC: 7.6 UG/DL
TSH SERPL-ACNC: 3.6 UIU/ML
WBC # FLD AUTO: 7.47 K/UL

## 2021-06-15 ENCOUNTER — RX RENEWAL (OUTPATIENT)
Age: 86
End: 2021-06-15

## 2021-06-24 ENCOUNTER — APPOINTMENT (OUTPATIENT)
Dept: PULMONOLOGY | Facility: CLINIC | Age: 86
End: 2021-06-24
Payer: MEDICARE

## 2021-06-24 VITALS
HEART RATE: 78 BPM | SYSTOLIC BLOOD PRESSURE: 144 MMHG | TEMPERATURE: 98.2 F | DIASTOLIC BLOOD PRESSURE: 82 MMHG | OXYGEN SATURATION: 94 %

## 2021-06-24 DIAGNOSIS — E87.1 HYPO-OSMOLALITY AND HYPONATREMIA: ICD-10-CM

## 2021-06-24 DIAGNOSIS — M25.559 PAIN IN UNSPECIFIED HIP: ICD-10-CM

## 2021-06-24 PROCEDURE — 99214 OFFICE O/P EST MOD 30 MIN: CPT

## 2021-06-25 LAB
ANION GAP SERPL CALC-SCNC: 15 MMOL/L
BUN SERPL-MCNC: 14 MG/DL
CALCIUM SERPL-MCNC: 9.9 MG/DL
CHLORIDE SERPL-SCNC: 96 MMOL/L
CO2 SERPL-SCNC: 24 MMOL/L
CREAT SERPL-MCNC: 1.14 MG/DL
GLUCOSE SERPL-MCNC: 105 MG/DL
POTASSIUM SERPL-SCNC: 4.7 MMOL/L
SODIUM SERPL-SCNC: 135 MMOL/L

## 2021-06-27 PROBLEM — E87.1 HYPONATREMIA: Status: ACTIVE | Noted: 2020-08-04

## 2021-06-27 NOTE — ASSESSMENT
[FreeTextEntry1] : Continue current BP medications check serum sodium orthopedic evaluation for hip pain

## 2021-06-27 NOTE — HISTORY OF PRESENT ILLNESS
[TextBox_4] : Follow-up for hypertension and hyponatremia noted asymptomatic hyponatremia on last lab work likely secondary to chlorthalidone blood pressure medication changed here for follow-up BP check and labs\par \par Notes increasing hip pain requesting orthopedic evaluation

## 2021-06-29 ENCOUNTER — APPOINTMENT (OUTPATIENT)
Dept: ORTHOPEDIC SURGERY | Facility: CLINIC | Age: 86
End: 2021-06-29
Payer: MEDICARE

## 2021-06-29 VITALS
HEIGHT: 58 IN | DIASTOLIC BLOOD PRESSURE: 79 MMHG | SYSTOLIC BLOOD PRESSURE: 165 MMHG | HEART RATE: 74 BPM | WEIGHT: 108 LBS | BODY MASS INDEX: 22.67 KG/M2 | OXYGEN SATURATION: 95 %

## 2021-06-29 DIAGNOSIS — Z87.39 PERSONAL HISTORY OF OTHER DISEASES OF THE MUSCULOSKELETAL SYSTEM AND CONNECTIVE TISSUE: ICD-10-CM

## 2021-06-29 DIAGNOSIS — Z60.2 PROBLEMS RELATED TO LIVING ALONE: ICD-10-CM

## 2021-06-29 DIAGNOSIS — Z86.79 PERSONAL HISTORY OF OTHER DISEASES OF THE CIRCULATORY SYSTEM: ICD-10-CM

## 2021-06-29 DIAGNOSIS — M41.86 OTHER FORMS OF SCOLIOSIS, LUMBAR REGION: ICD-10-CM

## 2021-06-29 PROCEDURE — 99204 OFFICE O/P NEW MOD 45 MIN: CPT

## 2021-06-29 PROCEDURE — 72170 X-RAY EXAM OF PELVIS: CPT

## 2021-06-29 PROCEDURE — 72100 X-RAY EXAM L-S SPINE 2/3 VWS: CPT

## 2021-06-29 SDOH — SOCIAL STABILITY - SOCIAL INSECURITY: PROBLEMS RELATED TO LIVING ALONE: Z60.2

## 2021-06-29 NOTE — PHYSICAL EXAM
[de-identified] : Zickel examination discloses paravertebral muscle spasm and left lower lumbar tenderness to palpation at mildly positive straight leg raise at 50 degrees.  Lateral lumbar flexion is on the left side.\par Examination of the hips disclose stable nontender pain-free range of motion. [de-identified] : X-rays taken of the pelvis close minimal periarticular osteophytes otherwise nonspecific.\par X-rays taken of the lumbosacral spine and AP and lateral projections disclose significant narrative scoliosis with multilevel disc space narrowing and periarticular osteophytes

## 2021-06-29 NOTE — HISTORY OF PRESENT ILLNESS
[Worsening] : worsening [0] : a minimum pain level of 0/10 [10] : a maximum pain level of 10/10 [Intermit.] : ~He/She~ states the symptoms seem to be intermittent [Walking] : worsened by walking [Heat] : relieved by heat [Rest] : relieved by rest [de-identified] : Pt presents for initial evaluation with pain in her left hip pt is point to her left buttock. Pt has no numbness or tingling to the left lower extremity, pt has taken tylenol for pain prn, pt has a hx of sciatica. [de-identified] : certain movements

## 2021-06-29 NOTE — DISCUSSION/SUMMARY
[de-identified] : Patient was advised of her findings she will be referred to a spine specialist for further definitive care.  In the interim she will be prescribed tramadol

## 2021-07-01 ENCOUNTER — APPOINTMENT (OUTPATIENT)
Dept: ORTHOPEDIC SURGERY | Facility: CLINIC | Age: 86
End: 2021-07-01
Payer: MEDICARE

## 2021-07-01 VITALS
SYSTOLIC BLOOD PRESSURE: 152 MMHG | WEIGHT: 105 LBS | HEART RATE: 76 BPM | HEIGHT: 58 IN | DIASTOLIC BLOOD PRESSURE: 84 MMHG | BODY MASS INDEX: 22.04 KG/M2

## 2021-07-01 DIAGNOSIS — M51.36 OTHER INTERVERTEBRAL DISC DEGENERATION, LUMBAR REGION: ICD-10-CM

## 2021-07-01 PROCEDURE — 72100 X-RAY EXAM L-S SPINE 2/3 VWS: CPT | Mod: 59

## 2021-07-01 PROCEDURE — 72082 X-RAY EXAM ENTIRE SPI 2/3 VW: CPT

## 2021-07-01 PROCEDURE — 99213 OFFICE O/P EST LOW 20 MIN: CPT | Mod: 57

## 2021-07-01 PROCEDURE — 22310 CLOSED TX VERT FX W/O MANJ: CPT

## 2021-07-01 NOTE — PHYSICAL EXAM
[de-identified] : Lumbar Physical Exam\par \par Gait -slow slightly wide-based gait\par \par Station -shoulders are asymmetric\par \par Sagittal balance -overall alignment is neutral although the patient clearly has hyperkyphosis\par \par Compensatory mechanism? - None\par \par Heel walk - Normal\par \par Toe walk - Normal\par \par Reflexes\par Patellar - normal\par Gastroc - normal\par Clonus - No\par \par Hip Exam - Normal\par \par Straight leg raise - none\par \par Pulses - 2+ dp/pt\par \par Range of motion - normal\par \par Sensation \par Sensation intact to light touch in L1, L2, L3, L4, L5 and S1 dermatomes bilaterally\par \par Motor\par 	IP	Quad	HS	TA	Gastroc	EHL\par Right	5/5	5/5	5/5	5/5	5/5	5/5\par Left	5/5	5/5	5/5	5/5	5/5	5/5 [de-identified] : Scoliosis radiographs\par Obvious coronal curvature noted\par 70 degrees of lumbar lordosis\par Pelvic incidence of approximately 54 degrees\par Hyperkyphosis noted\par T5 and T8 compression fracture\par \par Lumbar radiographs\par No instability on flexion-extension radiographs

## 2021-07-01 NOTE — ASSESSMENT
[FreeTextEntry1] : This is a 89-year-old female here today for evaluation of her low back pain.  She has been dealing with the symptoms for several months and they have acutely gotten worse.  At this point she may be a candidate for an epidural steroid injection given the fact that she does have sepsis substantial degenerative changes in her spine.  Therefore we will proceed with a lumbar MRI.  In tandem with this she should begin physical therapy focused on her back and core muscles.  She should also work on gait training and balance exercises.  She can take Tylenol as needed for pain relief.  I will see her again in 3 to 4 weeks for repeat clinical evaluation.  I encouraged her to reach out sooner if she has any new or worsening symptoms.

## 2021-07-01 NOTE — HISTORY OF PRESENT ILLNESS
[de-identified] : This is a 89-year-old female here today for evaluation of her low back pain.  The symptoms have been ongoing for years but over the past 3+ months that they have acutely worsened.  She has a known history of adolescent idiopathic scoliosis according to the patient.  She has had decreased walking tolerance although she can likely walk 2-3 blocks at this point.  She denies any radiating pain down her legs.  She denies any bowel bladder issues.  She denies any saddle anesthesia.

## 2021-07-08 ENCOUNTER — APPOINTMENT (OUTPATIENT)
Dept: PULMONOLOGY | Facility: CLINIC | Age: 86
End: 2021-07-08

## 2021-07-09 ENCOUNTER — APPOINTMENT (OUTPATIENT)
Dept: ENDOCRINOLOGY | Facility: CLINIC | Age: 86
End: 2021-07-09
Payer: MEDICARE

## 2021-07-09 PROCEDURE — 96401 CHEMO ANTI-NEOPL SQ/IM: CPT

## 2021-07-09 RX ORDER — DENOSUMAB 60 MG/ML
60 INJECTION SUBCUTANEOUS
Qty: 1 | Refills: 0 | Status: COMPLETED | OUTPATIENT
Start: 2021-07-09

## 2021-07-09 RX ADMIN — DENOSUMAB 0 MG/ML: 60 INJECTION SUBCUTANEOUS at 00:00

## 2021-07-27 ENCOUNTER — APPOINTMENT (OUTPATIENT)
Dept: MRI IMAGING | Facility: CLINIC | Age: 86
End: 2021-07-27
Payer: MEDICARE

## 2021-07-27 ENCOUNTER — RESULT REVIEW (OUTPATIENT)
Age: 86
End: 2021-07-27

## 2021-07-27 ENCOUNTER — OUTPATIENT (OUTPATIENT)
Dept: OUTPATIENT SERVICES | Facility: HOSPITAL | Age: 86
LOS: 1 days | End: 2021-07-27
Payer: MEDICARE

## 2021-07-27 DIAGNOSIS — M51.36 OTHER INTERVERTEBRAL DISC DEGENERATION, LUMBAR REGION: ICD-10-CM

## 2021-07-27 PROCEDURE — 72148 MRI LUMBAR SPINE W/O DYE: CPT

## 2021-07-27 PROCEDURE — 72148 MRI LUMBAR SPINE W/O DYE: CPT | Mod: 26

## 2021-08-13 ENCOUNTER — APPOINTMENT (OUTPATIENT)
Dept: ORTHOPEDIC SURGERY | Facility: CLINIC | Age: 86
End: 2021-08-13
Payer: MEDICARE

## 2021-08-13 DIAGNOSIS — S22.000A WEDGE COMPRESSION FRACTURE OF UNSPECIFIED THORACIC VERTEBRA, INITIAL ENCOUNTER FOR CLOSED FRACTURE: ICD-10-CM

## 2021-08-13 DIAGNOSIS — M54.16 RADICULOPATHY, LUMBAR REGION: ICD-10-CM

## 2021-08-13 PROCEDURE — 99213 OFFICE O/P EST LOW 20 MIN: CPT | Mod: 24

## 2021-08-13 NOTE — ASSESSMENT
[FreeTextEntry1] : I had a long discussion with both the patient the patient's family regards to her treatment plan and diagnosis.  They would like to pursue continued conservative management.  The patient would like to avoid surgery at all cost which I think is absolutely reasonable.  We will proceed with a referral to [place] [facility] for further treatment.  She can continue with physical therapy.  I will have her follow-up in 2 to 3 months.  She knows to call back sooner if her symptoms worsen or change in any way.

## 2021-08-13 NOTE — HISTORY OF PRESENT ILLNESS
[de-identified] : Today the patient states that she is still dealing with significant low back pain.  She has also left gluteal pain.  She denies any bowel bladder issues.  She denies any saddle anesthesia.  She denies any radiating pain down her legs.  She can only walk 2-3 blocks at this point.  She is here today to discuss her MRI results and possible treatment options for her pain.\par \par 07/01/21\par This is a 89-year-old female here today for evaluation of her low back pain.  The symptoms have been ongoing for years but over the past 3+ months that they have acutely worsened.  She has a known history of adolescent idiopathic scoliosis according to the patient.  She has had decreased walking tolerance although she can likely walk 2-3 blocks at this point.  She denies any radiating pain down her legs.  She denies any bowel bladder issues.  She denies any saddle anesthesia.

## 2021-08-13 NOTE — PHYSICAL EXAM
[de-identified] : Lumbar Physical Exam\par \par Gait -slow slightly wide-based gait\par \par Station -shoulders are asymmetric\par \par Sagittal balance -overall alignment is neutral although the patient clearly has hyperkyphosis\par \par Compensatory mechanism? - None\par \par Heel walk - Normal\par \par Toe walk - Normal\par \par Reflexes\par Patellar - normal\par Gastroc - normal\par Clonus - No\par \par Hip Exam - Normal\par \par Straight leg raise - none\par \par Pulses - 2+ dp/pt\par \par Range of motion - normal\par \par Sensation \par Sensation intact to light touch in L1, L2, L3, L4, L5 and S1 dermatomes bilaterally\par \par Motor\par 	IP	Quad	HS	TA	Gastroc	EHL\par Right	5/5	5/5	5/5	5/5	5/5	5/5\par Left	5/5	5/5	5/5	5/5	5/5	5/5 [de-identified] : Scoliosis radiographs\par Obvious coronal curvature noted\par 70 degrees of lumbar lordosis\par Pelvic incidence of approximately 54 degrees\par Hyperkyphosis noted\par T5 and T8 compression fracture\par \par Lumbar radiographs\par No instability on flexion-extension radiographs\par \par Lumbar MRI\par Multiple areas of significant foraminal stenosis\par Significant facet arthropathy noted\par No areas of critical central stenosis

## 2021-08-26 ENCOUNTER — RX RENEWAL (OUTPATIENT)
Age: 86
End: 2021-08-26

## 2021-08-26 RX ORDER — CHLORTHALIDONE 25 MG/1
25 TABLET ORAL
Qty: 45 | Refills: 0 | Status: ACTIVE | COMMUNITY
Start: 2021-05-11 | End: 1900-01-01

## 2021-11-17 ENCOUNTER — APPOINTMENT (OUTPATIENT)
Dept: ORTHOPEDIC SURGERY | Facility: CLINIC | Age: 86
End: 2021-11-17

## 2021-11-19 NOTE — ASSESSMENT
[facility] PHARMACY REFILL CONSULTATION     Situation  Writer is following up with patient for 90-day assessment of Humira prescription.      Background of Drug Therapy (include start date)  Patient has been receiving Humira from [facility] since 12/2019 for treatment of RA.      Assessment Questionnaires    A. What changes do you have on your medication regimen (ie. new medications, dose changes etc)?  none  • Does change result in:  i.           New drug-drug interactions?  No  ii. New drug-food interactions?  No  iii. Therapy modifications?  No     Medication reconciliation completed today? No    B. What kind of side effects are you experiencing?  none    C. How do you feel this medication is working for you? What did the doctor tell you about the effectiveness of this medication?   stable    D. How many doses have you missed since your last refill?   none    E. Education Materials or supplies given today   Drug specific information:       Recommendations and pharmacist's care plan including follow up (including clinic contact instructions)  1. Recommend to continue current medication as prescribed. Patient is not having any side effects, is adherent, and is responding to therapy.  2. Desires/movitation of the patient: reduce joint pain  3. Problems/needs identified from LAST assessment: none  a.   Strategies/Interventions implemented to address problems/needs: nA  b.   Progress towards treatment goals: NA  4. NEW problems and/or needs identified upon today's assessment: none  a.   Strategies to address problems and/or needs: nA  b.   Measurable goals and timeframe for each: NA  5. No changes to the goals of care:  a.   Ensure adherence  b.   Minimize side effects  c.   Maximize patient’s response to therapy  6. Patient was counseled on proper use of additional supplies provided, if any, as detailed above to help with common side effects and promote adherence.  7. Resources available to  [FreeTextEntry1] : 89 y/o f with now controlled HTN  with Norvasc 5 mg\par Follow-up bone density stable implement this care plan: verbal education by pharmacist, written education materials, pharmacist follow-up assessments.   8. Patient’s medical records (EPIC SmartChart) will be reviewed once every month or every cycle, whichever is less, to evaluate refill appropriateness and make interventions based on changes in their profile (ie. new medications, recent lab results etc).  9. An [facility] caregiver will follow up for refill delivery in 4 weeks, and determine treatment compliance and presence of adverse effects to treatment.  10. [facility] 300 day assessment due in August 2022 to evaluate patient’s response to therapy, side effects, changes in their medications/allergies, drug interactions and adherence.  11. Medication will be shipped 11/22 via [employer] and estimated delivery on 11/23 to [address] [place] [id number].  12. Patient has ASP phone number, [phone], for questions and concerns.   13. Patient acknowledges and agrees with plan of care.     Bruno Ordoñez, PharmD  [facility] Coordinator   T: [phone] F: [phone]  [email or web address]

## 2021-12-08 ENCOUNTER — APPOINTMENT (OUTPATIENT)
Dept: INTERNAL MEDICINE | Facility: CLINIC | Age: 86
End: 2021-12-08
Payer: MEDICARE

## 2021-12-08 VITALS
TEMPERATURE: 96.9 F | HEART RATE: 112 BPM | SYSTOLIC BLOOD PRESSURE: 118 MMHG | HEIGHT: 58 IN | WEIGHT: 109 LBS | DIASTOLIC BLOOD PRESSURE: 65 MMHG | OXYGEN SATURATION: 96 % | BODY MASS INDEX: 22.88 KG/M2 | RESPIRATION RATE: 18 BRPM

## 2021-12-08 PROCEDURE — 99213 OFFICE O/P EST LOW 20 MIN: CPT

## 2021-12-08 RX ORDER — PHENOBARBITAL, HYOSCYAMINE SULFATE, ATROPINE SULFATE, SCOPOLAMINE HYDROBROMIDE 16.2; .1037; .0194; .0065 MG/1; MG/1; MG/1; MG/1
16.2 TABLET ORAL
Qty: 60 | Refills: 5 | Status: ACTIVE | COMMUNITY
Start: 2021-12-08 | End: 1900-01-01

## 2022-01-20 NOTE — ASSESSMENT
[FreeTextEntry1] : Patient is a 89-year-old woman with history of osteoporosis here for endocrinology follow-up\par \par 1.  Osteoporosis\par Patient was on Fosamax in the past.  Was discontinued in the setting of IBS, constipation type.\par It is unlikely the cause of constipation and patient was started on Prolia in May 2019.  Secondary to the COVID pandemic, pace and missed her 6-month dosing in May 2020.\par She received a dose in November 2020 and another dose in July 2021.\par Here for her injection today.\par \par Discussed the risk of ONJ and atypical femur fracture.\par Bone density November 10, 2020\par L1-L4: BMD 0.769, T score -2.5, Z score 0.3\par 2019 T score -2.5\par Femoral neck: BMD 0.676, T score -1.6, Z score 1.0\par 2019 T score -1.9\par Total hip: BMD 0.680, T score -2.2, Z score 0.2\par 2019 T score -2.2\par

## 2022-01-21 ENCOUNTER — APPOINTMENT (OUTPATIENT)
Dept: ENDOCRINOLOGY | Facility: CLINIC | Age: 87
End: 2022-01-21

## 2022-02-08 ENCOUNTER — APPOINTMENT (OUTPATIENT)
Dept: INTERNAL MEDICINE | Facility: CLINIC | Age: 87
End: 2022-02-08
Payer: MEDICARE

## 2022-02-08 VITALS
BODY MASS INDEX: 23.51 KG/M2 | HEIGHT: 58 IN | WEIGHT: 112 LBS | SYSTOLIC BLOOD PRESSURE: 155 MMHG | OXYGEN SATURATION: 91 % | DIASTOLIC BLOOD PRESSURE: 68 MMHG | TEMPERATURE: 97.4 F | HEART RATE: 67 BPM

## 2022-02-08 DIAGNOSIS — R14.0 ABDOMINAL DISTENSION (GASEOUS): ICD-10-CM

## 2022-02-08 PROCEDURE — 99213 OFFICE O/P EST LOW 20 MIN: CPT

## 2022-02-08 NOTE — PHYSICAL EXAM
[General Appearance - Alert] : alert [General Appearance - In No Acute Distress] : in no acute distress [Bowel Sounds] : normal bowel sounds [Abdomen Soft] : soft [Abdomen Tenderness] : non-tender [] : no hepato-splenomegaly [Abdomen Mass (___ Cm)] : no abdominal mass palpated

## 2022-02-08 NOTE — HISTORY OF PRESENT ILLNESS
[FreeTextEntry1] : \par Great grandma again!  Ramon had a boy, Christo\par \par Never took DOnnatal - too expensive\par \par Taking \par \par After eating takeas a dicyclomine - helps with the stomach aches\par \par Feels bloated\par

## 2022-02-08 NOTE — ASSESSMENT
[FreeTextEntry1] : \par Constipation - doing well on present regimen\par \par abd pain - better with dicyclomine\par \par bloating - avoiding dairy; check US\par Try FD Guard

## 2022-02-22 ENCOUNTER — APPOINTMENT (OUTPATIENT)
Dept: ENDOCRINOLOGY | Facility: CLINIC | Age: 87
End: 2022-02-22
Payer: MEDICARE

## 2022-02-22 PROCEDURE — 96401 CHEMO ANTI-NEOPL SQ/IM: CPT

## 2022-02-22 RX ORDER — DENOSUMAB 60 MG/ML
60 INJECTION SUBCUTANEOUS
Qty: 1 | Refills: 0 | Status: COMPLETED | OUTPATIENT
Start: 2022-02-22

## 2022-02-22 RX ADMIN — DENOSUMAB 60 MG/ML: 60 INJECTION SUBCUTANEOUS at 00:00

## 2022-02-28 ENCOUNTER — APPOINTMENT (OUTPATIENT)
Dept: ULTRASOUND IMAGING | Facility: CLINIC | Age: 87
End: 2022-02-28
Payer: MEDICARE

## 2022-02-28 ENCOUNTER — OUTPATIENT (OUTPATIENT)
Dept: OUTPATIENT SERVICES | Facility: HOSPITAL | Age: 87
LOS: 1 days | End: 2022-02-28
Payer: MEDICARE

## 2022-02-28 DIAGNOSIS — R14.0 ABDOMINAL DISTENSION (GASEOUS): ICD-10-CM

## 2022-02-28 PROCEDURE — 76700 US EXAM ABDOM COMPLETE: CPT | Mod: 26

## 2022-02-28 PROCEDURE — 76700 US EXAM ABDOM COMPLETE: CPT

## 2022-05-18 ENCOUNTER — APPOINTMENT (OUTPATIENT)
Dept: GASTROENTEROLOGY | Facility: CLINIC | Age: 87
End: 2022-05-18
Payer: MEDICARE

## 2022-05-18 VITALS
SYSTOLIC BLOOD PRESSURE: 114 MMHG | WEIGHT: 110 LBS | OXYGEN SATURATION: 97 % | DIASTOLIC BLOOD PRESSURE: 78 MMHG | HEART RATE: 78 BPM | BODY MASS INDEX: 23.09 KG/M2 | HEIGHT: 58 IN

## 2022-05-18 PROCEDURE — 99213 OFFICE O/P EST LOW 20 MIN: CPT

## 2022-05-18 NOTE — HISTORY OF PRESENT ILLNESS
[FreeTextEntry1] : \par Troubled by a flutter in her stomach - not related to meals\par \par Taking stool softener - prunes, mineral oil, Citrucel - has BM daily\par \par \par

## 2022-07-11 ENCOUNTER — INPATIENT (INPATIENT)
Facility: HOSPITAL | Age: 87
LOS: 2 days | Discharge: NURSING, REHAB OR LONG-TERM CARE | DRG: 563 | End: 2022-07-14
Attending: STUDENT IN AN ORGANIZED HEALTH CARE EDUCATION/TRAINING PROGRAM | Admitting: HOSPITALIST
Payer: MEDICARE

## 2022-07-11 VITALS
RESPIRATION RATE: 16 BRPM | TEMPERATURE: 98 F | SYSTOLIC BLOOD PRESSURE: 171 MMHG | HEART RATE: 74 BPM | OXYGEN SATURATION: 95 % | DIASTOLIC BLOOD PRESSURE: 92 MMHG | WEIGHT: 108.03 LBS

## 2022-07-11 DIAGNOSIS — S43.004A UNSPECIFIED DISLOCATION OF RIGHT SHOULDER JOINT, INITIAL ENCOUNTER: ICD-10-CM

## 2022-07-11 LAB
ALBUMIN SERPL ELPH-MCNC: 3.9 G/DL — SIGNIFICANT CHANGE UP (ref 3.3–5)
ALP SERPL-CCNC: 38 U/L — LOW (ref 40–120)
ALT FLD-CCNC: 13 U/L — SIGNIFICANT CHANGE UP (ref 10–45)
ANION GAP SERPL CALC-SCNC: 14 MMOL/L — SIGNIFICANT CHANGE UP (ref 5–17)
APPEARANCE UR: CLEAR — SIGNIFICANT CHANGE UP
APTT BLD: 23.3 SEC — LOW (ref 27.5–35.5)
AST SERPL-CCNC: 23 U/L — SIGNIFICANT CHANGE UP (ref 10–40)
BACTERIA # UR AUTO: NEGATIVE — SIGNIFICANT CHANGE UP
BASOPHILS # BLD AUTO: 0.04 K/UL — SIGNIFICANT CHANGE UP (ref 0–0.2)
BASOPHILS NFR BLD AUTO: 0.5 % — SIGNIFICANT CHANGE UP (ref 0–2)
BILIRUB SERPL-MCNC: 0.3 MG/DL — SIGNIFICANT CHANGE UP (ref 0.2–1.2)
BILIRUB UR-MCNC: NEGATIVE — SIGNIFICANT CHANGE UP
BUN SERPL-MCNC: 26 MG/DL — HIGH (ref 7–23)
CALCIUM SERPL-MCNC: 9.2 MG/DL — SIGNIFICANT CHANGE UP (ref 8.4–10.5)
CHLORIDE SERPL-SCNC: 98 MMOL/L — SIGNIFICANT CHANGE UP (ref 96–108)
CO2 SERPL-SCNC: 20 MMOL/L — LOW (ref 22–31)
COLOR SPEC: SIGNIFICANT CHANGE UP
CREAT SERPL-MCNC: 1.24 MG/DL — SIGNIFICANT CHANGE UP (ref 0.5–1.3)
DIFF PNL FLD: NEGATIVE — SIGNIFICANT CHANGE UP
EGFR: 41 ML/MIN/1.73M2 — LOW
EOSINOPHIL # BLD AUTO: 0.17 K/UL — SIGNIFICANT CHANGE UP (ref 0–0.5)
EOSINOPHIL NFR BLD AUTO: 2 % — SIGNIFICANT CHANGE UP (ref 0–6)
EPI CELLS # UR: 0 /HPF — SIGNIFICANT CHANGE UP
GLUCOSE SERPL-MCNC: 116 MG/DL — HIGH (ref 70–99)
GLUCOSE UR QL: NEGATIVE — SIGNIFICANT CHANGE UP
HCT VFR BLD CALC: 32.1 % — LOW (ref 34.5–45)
HGB BLD-MCNC: 10.7 G/DL — LOW (ref 11.5–15.5)
IMM GRANULOCYTES NFR BLD AUTO: 0.5 % — SIGNIFICANT CHANGE UP (ref 0–1.5)
INR BLD: 0.98 RATIO — SIGNIFICANT CHANGE UP (ref 0.88–1.16)
KETONES UR-MCNC: ABNORMAL
LEUKOCYTE ESTERASE UR-ACNC: NEGATIVE — SIGNIFICANT CHANGE UP
LYMPHOCYTES # BLD AUTO: 2.63 K/UL — SIGNIFICANT CHANGE UP (ref 1–3.3)
LYMPHOCYTES # BLD AUTO: 30.5 % — SIGNIFICANT CHANGE UP (ref 13–44)
MAGNESIUM SERPL-MCNC: 1.9 MG/DL — SIGNIFICANT CHANGE UP (ref 1.6–2.6)
MCHC RBC-ENTMCNC: 30.7 PG — SIGNIFICANT CHANGE UP (ref 27–34)
MCHC RBC-ENTMCNC: 33.3 GM/DL — SIGNIFICANT CHANGE UP (ref 32–36)
MCV RBC AUTO: 92.2 FL — SIGNIFICANT CHANGE UP (ref 80–100)
MONOCYTES # BLD AUTO: 0.56 K/UL — SIGNIFICANT CHANGE UP (ref 0–0.9)
MONOCYTES NFR BLD AUTO: 6.5 % — SIGNIFICANT CHANGE UP (ref 2–14)
NEUTROPHILS # BLD AUTO: 5.18 K/UL — SIGNIFICANT CHANGE UP (ref 1.8–7.4)
NEUTROPHILS NFR BLD AUTO: 60 % — SIGNIFICANT CHANGE UP (ref 43–77)
NITRITE UR-MCNC: NEGATIVE — SIGNIFICANT CHANGE UP
NRBC # BLD: 0 /100 WBCS — SIGNIFICANT CHANGE UP (ref 0–0)
PH UR: 8 — SIGNIFICANT CHANGE UP (ref 5–8)
PLATELET # BLD AUTO: 185 K/UL — SIGNIFICANT CHANGE UP (ref 150–400)
POTASSIUM SERPL-MCNC: 4.4 MMOL/L — SIGNIFICANT CHANGE UP (ref 3.5–5.3)
POTASSIUM SERPL-SCNC: 4.4 MMOL/L — SIGNIFICANT CHANGE UP (ref 3.5–5.3)
PROT SERPL-MCNC: 6.8 G/DL — SIGNIFICANT CHANGE UP (ref 6–8.3)
PROT UR-MCNC: ABNORMAL
PROTHROM AB SERPL-ACNC: 11.4 SEC — SIGNIFICANT CHANGE UP (ref 10.5–13.4)
RBC # BLD: 3.48 M/UL — LOW (ref 3.8–5.2)
RBC # FLD: 13.7 % — SIGNIFICANT CHANGE UP (ref 10.3–14.5)
RBC CASTS # UR COMP ASSIST: 2 /HPF — SIGNIFICANT CHANGE UP (ref 0–4)
SODIUM SERPL-SCNC: 132 MMOL/L — LOW (ref 135–145)
SP GR SPEC: 1.02 — SIGNIFICANT CHANGE UP (ref 1.01–1.02)
UROBILINOGEN FLD QL: NEGATIVE — SIGNIFICANT CHANGE UP
WBC # BLD: 8.62 K/UL — SIGNIFICANT CHANGE UP (ref 3.8–10.5)
WBC # FLD AUTO: 8.62 K/UL — SIGNIFICANT CHANGE UP (ref 3.8–10.5)
WBC UR QL: 5 /HPF — SIGNIFICANT CHANGE UP (ref 0–5)

## 2022-07-11 PROCEDURE — 12011 RPR F/E/E/N/L/M 2.5 CM/<: CPT

## 2022-07-11 PROCEDURE — 73200 CT UPPER EXTREMITY W/O DYE: CPT | Mod: 26,RT,MA

## 2022-07-11 PROCEDURE — 71045 X-RAY EXAM CHEST 1 VIEW: CPT | Mod: 26

## 2022-07-11 PROCEDURE — 73060 X-RAY EXAM OF HUMERUS: CPT | Mod: 26,RT

## 2022-07-11 PROCEDURE — 99285 EMERGENCY DEPT VISIT HI MDM: CPT | Mod: 25

## 2022-07-11 PROCEDURE — 73030 X-RAY EXAM OF SHOULDER: CPT | Mod: 26,RT,76

## 2022-07-11 PROCEDURE — 72125 CT NECK SPINE W/O DYE: CPT | Mod: 26,MA

## 2022-07-11 PROCEDURE — 76377 3D RENDER W/INTRP POSTPROCES: CPT | Mod: 26

## 2022-07-11 PROCEDURE — 72170 X-RAY EXAM OF PELVIS: CPT | Mod: 26

## 2022-07-11 PROCEDURE — 70450 CT HEAD/BRAIN W/O DYE: CPT | Mod: 26,MA

## 2022-07-11 RX ORDER — TETANUS TOXOID, REDUCED DIPHTHERIA TOXOID AND ACELLULAR PERTUSSIS VACCINE, ADSORBED 5; 2.5; 8; 8; 2.5 [IU]/.5ML; [IU]/.5ML; UG/.5ML; UG/.5ML; UG/.5ML
0.5 SUSPENSION INTRAMUSCULAR ONCE
Refills: 0 | Status: COMPLETED | OUTPATIENT
Start: 2022-07-11 | End: 2022-07-11

## 2022-07-11 RX ORDER — LIDOCAINE HCL 20 MG/ML
20 VIAL (ML) INJECTION ONCE
Refills: 0 | Status: COMPLETED | OUTPATIENT
Start: 2022-07-11 | End: 2022-07-11

## 2022-07-11 RX ORDER — SODIUM CHLORIDE 9 MG/ML
1000 INJECTION, SOLUTION INTRAVENOUS ONCE
Refills: 0 | Status: COMPLETED | OUTPATIENT
Start: 2022-07-11 | End: 2022-07-11

## 2022-07-11 RX ORDER — ACETAMINOPHEN 500 MG
750 TABLET ORAL ONCE
Refills: 0 | Status: COMPLETED | OUTPATIENT
Start: 2022-07-11 | End: 2022-07-11

## 2022-07-11 RX ORDER — PROPOFOL 10 MG/ML
20 INJECTION, EMULSION INTRAVENOUS ONCE
Refills: 0 | Status: COMPLETED | OUTPATIENT
Start: 2022-07-11 | End: 2022-07-11

## 2022-07-11 RX ADMIN — SODIUM CHLORIDE 1000 MILLILITER(S): 9 INJECTION, SOLUTION INTRAVENOUS at 18:03

## 2022-07-11 RX ADMIN — Medication 300 MILLIGRAM(S): at 15:53

## 2022-07-11 RX ADMIN — PROPOFOL 20 MILLIGRAM(S): 10 INJECTION, EMULSION INTRAVENOUS at 21:03

## 2022-07-11 RX ADMIN — Medication 20 MILLILITER(S): at 17:14

## 2022-07-11 RX ADMIN — TETANUS TOXOID, REDUCED DIPHTHERIA TOXOID AND ACELLULAR PERTUSSIS VACCINE, ADSORBED 0.5 MILLILITER(S): 5; 2.5; 8; 8; 2.5 SUSPENSION INTRAMUSCULAR at 18:18

## 2022-07-11 NOTE — ED PROVIDER NOTE - CARE PLAN
Principal Discharge DX:	Closed traumatic dislocation of right shoulder  Secondary Diagnosis:	Laceration of forehead  Secondary Diagnosis:	Closed head injury, initial encounter   1

## 2022-07-11 NOTE — ED PROVIDER NOTE - NS ED ROS FT
CONST: no fevers, no chills  EYES: no pain, no vision changes  ENT: no sore throat, no ear pain, no change in hearing  CV: no chest pain, no leg swelling  RESP: no shortness of breath, no cough  ABD: no abdominal pain, no nausea, no vomiting, no diarrhea  : no dysuria, no flank pain, no hematuria  MSK: no back pain, +R shoulder pain  NEURO: no headache or additional neurologic complaints  HEME: no easy bleeding  SKIN:  no rash, +R forehead laceration

## 2022-07-11 NOTE — ED PROVIDER NOTE - NS ED ATTENDING STATEMENT MOD
Patient informed of results and recommendation to repeat pap in 1 year.   This was a shared visit with the AFSHIN. I reviewed and verified the documentation and independently performed the documented:

## 2022-07-11 NOTE — ED PROVIDER NOTE - PROGRESS NOTE DETAILS
Stephanie Jensen PGY-3: ortho paged. XR concerning for complete dislocation of right humeral head with possible glenoid fracture. Stephanie Jensen PGY-3: Ortho busy, to return call. Will attempt reduction. Stephanie Jensen PGY-3: Attempted reduction with local block. Unsuccessful. To reach out to ortho again for reduction with conscious sedation. Attending MD Forde: bedside reduction failed in spite of intra-articular block. Given possible glenoid fx, will request ortho assistance for reduction likely with procedural sedation Attending MD Forde: awaiting orthopedics arrival for shoulder reduction assistance. Stephanie Jensen PGY-3: Ortho at bedside. Patient consented for procedure under conscious sedation. Consent placed in chart. Stephanie Jensen PGY-3: Reduction successful. Patient lives alone. Is unsafe discharge as is at risk for recurrent fall. TBA.

## 2022-07-11 NOTE — ED ADULT NURSE NOTE - NS ED NURSE DISCH DISPOSITION
----- Message from Niya Cabrales PA-C sent at 2/12/2017 11:48 AM CST -----  She was placed on Keflex, which should cover the staph.     Take to completion, then start Keflex 250 mg daily (#30 with 2 refills).     If she is not feeling better after treatment, she will need a YARIEL.  
Generic message left for patient to call back regarding the plan of care.  She should call if she is not feeling better after treatment for a antwon.  In addition, she will need to start Keflex 250 daily (#30 with 2 refills) after completion of initial keflex treatment.    Patient returned the call.  States understanding.  She asked for the [facility] to [facility].  
Admitted

## 2022-07-11 NOTE — ED PROVIDER NOTE - CADM POA URETHRAL CATHETER
Vertigo   WHAT YOU NEED TO KNOW:   Vertigo is a condition that causes you to feel dizzy  You may feel that you or everything around you is moving or spinning  You may also feel like you are being pulled down or toward your side  DISCHARGE INSTRUCTIONS:   Return to the emergency department if:   · You have a headache and a stiff neck  · You have shaking chills and a fever  · You vomit over and over with no relief  · You have blood, pus, or fluid coming out of your ears  · You are confused  Contact your healthcare provider if:   · Your symptoms do not get better with treatment  · You have questions about your condition or care  Medicines:   · Medicine  may be given to help relieve your symptoms  · Take your medicine as directed  Contact your healthcare provider if you think your medicine is not helping or if you have side effects  Tell him or her if you are allergic to any medicine  Keep a list of the medicines, vitamins, and herbs you take  Include the amounts, and when and why you take them  Bring the list or the pill bottles to follow-up visits  Carry your medicine list with you in case of an emergency  Manage your symptoms:   · Do not drive , walk without help, or operate heavy machinery when you are dizzy  · Move slowly  when you move from one position to another position  Get up slowly from sitting or lying down  Sit or lie down right away if you feel dizzy  · Drink plenty of liquids  Liquids help prevent dehydration  Ask how much liquid to drink each day and which liquids are best for you  · Vestibular and balance rehabilitation therapy (VBRT)  is used to teach you exercises to improve your balance and strength  These exercises may help decrease your vertigo and improve your balance  Ask for more information about this therapy  Follow up with your doctor as directed:  Write down your questions so you remember to ask them during your visits     © Copyright [employer] 2021 Information is for [employer] use only and may not be sold, redistributed or otherwise used for commercial purposes  All illustrations and images included in CareNotes® are the copyrighted property of A D A M , Inc  or Margot Loomis  The above information is an  only  It is not intended as medical advice for individual conditions or treatments  Talk to your doctor, nurse or pharmacist before following any medical regimen to see if it is safe and effective for you  Tinnitus   WHAT YOU NEED TO KNOW:   Tinnitus is when you hear ringing, clicking, buzzing, or hissing in one or both ears  You may also hear whistling, chirping, or pulsing  It may be soft or loud, and at a low or high pitch  Tinnitus that lasts for longer than 6 months is considered chronic  DISCHARGE INSTRUCTIONS:   Call 911 if:   · You feel like hurting yourself or others because of the constant noise  Contact your healthcare provider if:   · You have headaches  · You are tired and have trouble concentrating or remembering things  · You have more anxiety or stress than usual     · You have deep sadness or depression  · You have trouble falling asleep or staying asleep  · Your symptoms do not go away or they get worse  · You have questions or concerns about your condition or care  Manage tinnitus:   · Counseling  can help you learn ways to relax, decrease stress, and make your tinnitus less noticeable  · Cognitive behavioral therapy  helps you understand your condition  Your therapist will help you learn to cope with tinnitus  You may also learn new ways to relax and retrain your behavior to decrease your symptoms  · Sound therapy, such as white noise machines, may help cover your tinnitus with a pleasant sound  Sound therapy devices can help you fall asleep or help you relax  These devices can be worn in your ear or placed next to your bed at night      · Hearing aids or cochlear implants may help if you have hearing loss  · Do not smoke  Nicotine decreases blood flow to your ear and can make your tinnitus worse  Do not use e-cigarettes or smokeless tobacco in place of cigarettes or to help you quit  They still contain nicotine  Ask your healthcare provider for information if you currently smoke and need help quitting  · Decrease how much alcohol and caffeine you drink  Alcohol and caffeine can make your tinnitus worse  Prevent tinnitus:   · Avoid exposure to loud noise, such as loud music or power tools  Occasional exposure can still cause tinnitus  Move away from the noise or turn down the volume  · Wear ear protection  when you are exposed to loud noises  Good ear protection includes ear plugs or headphones that reduce noise  Follow up with your healthcare provider in 1 to 2 months:  Your healthcare provider may refer you to an otolaryngologist, audiologist, or neurologist  Fernando Morrell may need to return for regular follow-up visits  Write your questions down so you remember to ask them during your visits  © Copyright [employer] 2021 Information is for End User's use only and may not be sold, redistributed or otherwise used for commercial purposes  All illustrations and images included in CareNotes® are the copyrighted property of A [employer] , Inc  or Margot Smith   The above information is an  only  It is not intended as medical advice for individual conditions or treatments  Talk to your doctor, nurse or pharmacist before following any medical regimen to see if it is safe and effective for you  No

## 2022-07-11 NOTE — CONSULT NOTE ADULT - SUBJECTIVE AND OBJECTIVE BOX
89y Female presents c/o R shoulder pain and difficulty moving after mech fall.  Denies headstrike/LOC. Denies numbness/tingling in affected extremity. No other bone/joint complaints. Patient lives at home alone, walks without assistive devices. RHD.    Vital Signs Last 24 Hrs  T(C): 36.5 (07-11-22 @ 20:19), Max: 36.6 (07-11-22 @ 18:11)  T(F): 97.7 (07-11-22 @ 20:19), Max: 97.8 (07-11-22 @ 18:11)  HR: 77 (07-11-22 @ 21:27) (68 - 79)  BP: 168/67 (07-11-22 @ 21:27) (150/82 - 181/91)  BP(mean): 97 (07-11-22 @ 20:55) (97 - 101)  RR: 25 (07-11-22 @ 21:27) (16 - 29)  SpO2: 97% (07-11-22 @ 21:27) (95% - 100%)    PAST MEDICAL & SURGICAL HISTORY:    MEDICATIONS  (STANDING):    Allergies    sulfa drugs (Unknown)    Intolerances                            10.7   8.62  )-----------( 185      ( 11 Jul 2022 16:16 )             32.1     11 Jul 2022 16:16    132    |  98     |  26     ----------------------------<  116    4.4     |  20     |  1.24     Ca    9.2        11 Jul 2022 16:16  Mg     1.9       11 Jul 2022 16:16    TPro  6.8    /  Alb  3.9    /  TBili  0.3    /  DBili  x      /  AST  23     /  ALT  13     /  AlkPhos  38     11 Jul 2022 16:16    PT/INR - ( 11 Jul 2022 16:16 )   PT: 11.4 sec;   INR: 0.98 ratio         PTT - ( 11 Jul 2022 16:16 )  PTT:23.3 sec    PE:  Gen: NAD  RUE:   Skin intact, +anterior shoulder fullness, +sulcus sign   compartments soft  unable to range shoulder 2/2 pain  motor intact +ain/pin/m/r/u  SILT C5-T1  2+ rad      Imaging: XR demonstrates R shoulder GH dislocation    Procedure:  Under conscious sedation, Closed reduction performed. Post procedure imaging demonstrates located shoulder joint. Post procedure exam demonstrated NV intact.    A/P: 89yFemale s/p closed reduction of R shoulder dislocation  Pain control  NWB affected extremity in sling with swath strap  Ice  Active movement of fingers/wrist/elbow encouraged  CT R Shldr for eval of Hill-Sachs lesion/Fx  No acute surgical intervention at this time  Follow up with Dr. Bhakta within 1 week, call the office for appointment  Discuss case with Dr Bhakta who is aware and agrees with the above

## 2022-07-11 NOTE — ED PROVIDER NOTE - PHYSICAL EXAMINATION
grade IV systolic murmur, 2cm R forehead laceration, sling, contusion R knee GENERAL: Awake, alert, NAD  HEENT: 2cm R forehead laceration, no visible FB, bleeding well controlled. Moist mucous membranes, PERRL, EOMI. In c-collar.   LUNGS: CTAB, no wheezes or crackles   CARDIAC: RRR, grade IV systolic murmur  ABDOMEN: Soft, normal BS, non tender, non distended, no rebound, no guarding  BACK: No midline spinal tenderness, no CVA tenderness  EXT: No edema, no calf tenderness, 2+ DP pulses bilaterally, no deformities. No pelvic instability, full ROM knees and hips. RUE - pain to palpation of R shoulder joint, distally neurovascularly intact, in sling.   NEURO: A&Ox3. Moving all extremities.  SKIN: Warm and dry. No rash. Contusion to R knee.   PSYCH: Normal affect.

## 2022-07-11 NOTE — ED ADULT NURSE NOTE - NSIMPLEMENTINTERV_GEN_ALL_ED
Implemented All Fall with Harm Risk Interventions:  [place] to call system. Call bell, personal items and telephone within reach. Instruct patient to call for assistance. Room bathroom lighting operational. Non-slip footwear when patient is off stretcher. Physically safe environment: no spills, clutter or unnecessary equipment. Stretcher in lowest position, wheels locked, appropriate side rails in place. Provide visual cue, wrist band, yellow gown, etc. Monitor gait and stability. Monitor for mental status changes and reorient to person, place, and time. Review medications for side effects contributing to fall risk. Reinforce activity limits and safety measures with patient and family. Provide visual clues: red socks.

## 2022-07-11 NOTE — ED PROCEDURE NOTE - NS ED PROCEDURE ASSISTED BY
Supervision was available
The procedure was performed independently
The procedure was performed independently
Supervision was available

## 2022-07-11 NOTE — ED PROVIDER NOTE - OBJECTIVE STATEMENT
89 year old F with PMH HTN BIBEMS with a head injury after a fall. Patient states she was getting up from chair at lunch, tripped over, chair, fell forward onto her right side, injuring R shoulder and sustaining a R forehead laceration. No LOC, not on AC. Denies preceding lightheadedness, chest pain, shortness of breath, palpitations. Complaining mostly of R shoulder pain. Denies HA, vision changes, nausea, vomiting, neck pain, CP, SOB.

## 2022-07-11 NOTE — ED PROVIDER NOTE - WR ORDER ID 1
Dr. Bryant Rudd [id number] pt BIBA for complaints of body aches and malaise. 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 in [facility] custody with officer Brina ([id number])

## 2022-07-11 NOTE — ED ADULT NURSE NOTE - OBJECTIVE STATEMENT
89 yo presents to the ED from [facility]. A&Ox4 by EMS s/p fall. pt reports that her leg got caught in chair when she was getting up and she fell onto R side. pt reports R shoulder pain. arm in sling prior to EMS arrival. R sided head lac, minimal bleeding, not on blood thinners. denies LOC. denies neck or back pain. C collar applied to pt by EMS. son at bedside. Patient undressed and placed into gown, call bell in hand and side rails up for safety. warm blanket provided, vital signs stable, pt in no acute distress.

## 2022-07-11 NOTE — ED PROVIDER NOTE - ATTENDING APP SHARED VISIT CONTRIBUTION OF CARE
Attending MD Berumen:   I personally have seen and examined this patient.  Physician assistant note reviewed and agree on plan of care and except where noted.  See below for details.     Seen in [facility]    89F with PMH/PSH including HTN presents to the ED brought in by EMS s/p fall.  Reports that she was getting up from a chair at lunchtime, when she tripped, fell forward onto R side and hitting her face.  Reports now has R shoulder pain and a laceration to her forehead. Denies preceding dizziness, weakness, sensory changes.  Denies LOC, denies AC use.  Denies chest pain, shortness of breath, abdominal pain, nausea, vomiting, diarrhea, urinary complaints. Denies recent illness, fevers, chills.    Exam:   General: NAD  HENT: head NCAT, airway patent, no dried blood at nares, no blood in oropharynx, 2cm lac to R forehead  Eyes: PERRL, EOMI  Lungs: lungs CTAB with good inspiratory effort, no wheezing, no rhonchi, no rales  Cardiac: +S1S2, no obvious r/g, +murmur  GI: abdomen soft with +BS, NT, ND  : no CVAT  MSK: +C collar, no tenderness to midline palpation, no stepoffs along length of spine, +R shoulder tenderness to palpation, declines to range at R shoulder or elbow, limited ROM at hand and wrist secondary to proximal pain, +2 radials, pelvis stable, ranging LUE, bilateral LEs freely  Neuro: moving all extremities spontaneously, sensory grossly intact, no gross neuro deficits, no saddle anesthesia  Psych: normal mood and affect     A/P: 89F s/p mechanical fall, suspect R shoulder pathology, will obtain XRs of R shoulder, humerus, CXR and pelvis XR to eval for bony injury, will obtain CTH and CT C sp given fall, will eval for ICH or bony spinal injury, will obtain labs, give pain control, will sign out to afternoon team

## 2022-07-12 ENCOUNTER — TRANSCRIPTION ENCOUNTER (OUTPATIENT)
Age: 87
End: 2022-07-12

## 2022-07-12 DIAGNOSIS — D64.9 ANEMIA, UNSPECIFIED: ICD-10-CM

## 2022-07-12 DIAGNOSIS — S42.91XA FRACTURE OF RIGHT SHOULDER GIRDLE, PART UNSPECIFIED, INITIAL ENCOUNTER FOR CLOSED FRACTURE: ICD-10-CM

## 2022-07-12 DIAGNOSIS — R09.89 OTHER SPECIFIED SYMPTOMS AND SIGNS INVOLVING THE CIRCULATORY AND RESPIRATORY SYSTEMS: ICD-10-CM

## 2022-07-12 DIAGNOSIS — R01.1 CARDIAC MURMUR, UNSPECIFIED: ICD-10-CM

## 2022-07-12 DIAGNOSIS — I10 ESSENTIAL (PRIMARY) HYPERTENSION: ICD-10-CM

## 2022-07-12 DIAGNOSIS — Z29.9 ENCOUNTER FOR PROPHYLACTIC MEASURES, UNSPECIFIED: ICD-10-CM
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
## 2022-07-12 DIAGNOSIS — Z87.19 PERSONAL HISTORY OF OTHER DISEASES OF THE DIGESTIVE SYSTEM: ICD-10-CM

## 2022-07-12 LAB — SARS-COV-2 RNA SPEC QL NAA+PROBE: SIGNIFICANT CHANGE UP

## 2022-07-12 PROCEDURE — 99223 1ST HOSP IP/OBS HIGH 75: CPT

## 2022-07-12 RX ORDER — ENOXAPARIN SODIUM 100 MG/ML
30 INJECTION SUBCUTANEOUS EVERY 24 HOURS
Refills: 0 | Status: DISCONTINUED | OUTPATIENT
Start: 2022-07-12 | End: 2022-07-14

## 2022-07-12 RX ORDER — LOSARTAN POTASSIUM 100 MG/1
100 TABLET, FILM COATED ORAL DAILY
Refills: 0 | Status: DISCONTINUED | OUTPATIENT
Start: 2022-07-12 | End: 2022-07-14

## 2022-07-12 RX ORDER — ACETAMINOPHEN 500 MG
975 TABLET ORAL EVERY 8 HOURS
Refills: 0 | Status: DISCONTINUED | OUTPATIENT
Start: 2022-07-12 | End: 2022-07-14

## 2022-07-12 RX ORDER — ONDANSETRON 8 MG/1
4 TABLET, FILM COATED ORAL EVERY 8 HOURS
Refills: 0 | Status: DISCONTINUED | OUTPATIENT
Start: 2022-07-12 | End: 2022-07-12

## 2022-07-12 RX ORDER — LANOLIN ALCOHOL/MO/W.PET/CERES
3 CREAM (GRAM) TOPICAL AT BEDTIME
Refills: 0 | Status: DISCONTINUED | OUTPATIENT
Start: 2022-07-12 | End: 2022-07-14

## 2022-07-12 RX ORDER — LOSARTAN POTASSIUM 100 MG/1
100 TABLET, FILM COATED ORAL DAILY
Refills: 0 | Status: DISCONTINUED | OUTPATIENT
Start: 2022-07-12 | End: 2022-07-12

## 2022-07-12 RX ORDER — ACETAMINOPHEN 500 MG
650 TABLET ORAL EVERY 6 HOURS
Refills: 0 | Status: DISCONTINUED | OUTPATIENT
Start: 2022-07-12 | End: 2022-07-12

## 2022-07-12 RX ORDER — ACETAMINOPHEN 500 MG
975 TABLET ORAL ONCE
Refills: 0 | Status: COMPLETED | OUTPATIENT
Start: 2022-07-12 | End: 2022-07-12

## 2022-07-12 RX ORDER — HEPARIN SODIUM 5000 [USP'U]/ML
5000 INJECTION INTRAVENOUS; SUBCUTANEOUS EVERY 12 HOURS
Refills: 0 | Status: DISCONTINUED | OUTPATIENT
Start: 2022-07-12 | End: 2022-07-12

## 2022-07-12 RX ADMIN — Medication 975 MILLIGRAM(S): at 02:12

## 2022-07-12 RX ADMIN — Medication 975 MILLIGRAM(S): at 02:42

## 2022-07-12 RX ADMIN — HEPARIN SODIUM 5000 UNIT(S): 5000 INJECTION INTRAVENOUS; SUBCUTANEOUS at 05:47

## 2022-07-12 RX ADMIN — LOSARTAN POTASSIUM 100 MILLIGRAM(S): 100 TABLET, FILM COATED ORAL at 14:02

## 2022-07-12 RX ADMIN — ENOXAPARIN SODIUM 30 MILLIGRAM(S): 100 INJECTION SUBCUTANEOUS at 12:57

## 2022-07-12 NOTE — PHYSICAL THERAPY INITIAL EVALUATION ADULT - ADDITIONAL COMMENTS
Patient reports she lives in a private house with 4 steps to enter. She states she was independent prior, has a cane however does not use it.

## 2022-07-12 NOTE — PROGRESS NOTE ADULT - ASSESSMENT
89F w/ hx of HTN, IBS, osteoporosis, anemia p/w fall and R arm pain found to have R shoulder dislocation and Hill-Sachs lesion/ fracture s/p reduction by ortho in ED, PT rec for EVA, patient agrees, medically cleared for discharge, CM working on dispo to EVA/auth

## 2022-07-12 NOTE — PROGRESS NOTE ADULT - PROBLEM SELECTOR PLAN 1
Appreciate orthopedic recommendations. s/p R shoulder reduction, no further surgical intervention as per ortho.  -Pain control - pain controlled with tylenol  -NWB in RUE  -ICE and active movement of fingers/wrist/elbow  -Dr. Bhakta (ortho) f/u in 1 week outside  Patient for [facility] for PT/OT

## 2022-07-12 NOTE — H&P ADULT - PROBLEM SELECTOR PLAN 4
Pt denies any known history of heart murmur. Discussed possible risks including syncope and death. Pt states she would like to follow up with her PMD outpatient regarding murmur work up

## 2022-07-12 NOTE — DISCHARGE NOTE PROVIDER - HOSPITAL COURSE
89F w/ hx of HTN, IBS, osteoporosis, anemia p/w fall and R arm pain. Pt states she was in her usual state of health until today when she was having lunch with son and daughter in law. Pt was getting up from table when she thinks she tripped on table leg and fell on R side. Injured R shoulder and head. She denies any LOC or prodromal symptoms. Denies palpitations, chest pain, lightheadedness, fevers or chills    In ED found to have R shoulder dislocation and Hill-Sachs lesion/ fracture s/p reduction by ortho in ED,   No further surgical intervention as per ortho.  -Pain control - pain controlled with tylenol  -NWB in RUE  -ICE and active movement of fingers/wrist/elbow  -Dr. Bhakta (ortho) f/u in 1 week outside  -Note forehead lacerations sutured. CT head/neck negative.   -Will need sutures removed 5-10 days.  Patient for EVA per PT/OT.  PT rec for EVA, patient agrees, medically cleared for discharge by  with follow up as advised.   89F w/ hx of HTN, IBS, osteoporosis, anemia p/w fall and R arm pain. Pt states she was in her usual state of health until today when she was having lunch with son and daughter in law. Pt was getting up from table when she thinks she tripped on table leg and fell on R side. Injured R shoulder and head. She denies any LOC or prodromal symptoms. Denies palpitations, chest pain, lightheadedness, fevers or chills    Hospital Course:  Patient found to have R shoulder dislocation and [place]ig-Sachs lesion/fracture s/p reduction by ortho in ED. No further acute or surgical intervention needed per ortho, can follow-up outpatient. Patient's pain was controlled during her hospitalization with Tylenol which provided relief. Patient to keep arm non-weightbearing in the RUE after her reduction and to follow with Dr. Shea in 1 week. Forehead lacerations sutured and should be removed outpatient in 5-10 days.     Patient originally wanted to go home but now amenable to EVA per PT/OT. Patient stable and medically cleared for discharge to EVA.

## 2022-07-12 NOTE — PHYSICAL THERAPY INITIAL EVALUATION ADULT - PERTINENT HX OF CURRENT PROBLEM, REHAB EVAL
89F w/ hx of HTN, IBS, osteoporosis, anemia p/w fall and R arm pain. Pt was getting up from table when she thinks she tripped on table leg and fell on R side. Injured R shoulder and head. She denies any LOC or prodromal symptoms. Denies palpitations, chest pain, lightheadedness, fevers or chills.  ER: Given LR 1L, T dap, lidocaine and tylenol. Seen by ortho under conscious sedation for reduction of shoulder

## 2022-07-12 NOTE — H&P ADULT - NSHPPHYSICALEXAM_GEN_ALL_CORE
Vital Signs Last 24 Hrs  T(C): 36.6 (07-12-22 @ 01:05), Max: 36.9 (07-11-22 @ 23:04)  T(F): 97.8 (07-12-22 @ 01:05), Max: 98.5 (07-11-22 @ 23:04)  HR: 77 (07-12-22 @ 01:05) (68 - 82)  BP: 160/93 (07-12-22 @ 01:05) (150/82 - 181/91)  BP(mean): 117 (07-11-22 @ 23:04) (97 - 117)  RR: 18 (07-12-22 @ 01:05) (16 - 29)  SpO2: 97% (07-12-22 @ 01:05) (95% - 100%)

## 2022-07-12 NOTE — DISCHARGE NOTE PROVIDER - NSDCCAREPROVSEEN_GEN_ALL_CORE_FT
[facility] Team 2 Medicine [facility] Team 2 Medicine  Dr. Rojas, Mahad Sethi, [address]

## 2022-07-12 NOTE — DISCHARGE NOTE PROVIDER - NSDCCPCAREPLAN_GEN_ALL_CORE_FT
PRINCIPAL DISCHARGE DIAGNOSIS  Diagnosis: Closed traumatic dislocation of right shoulder  Assessment and Plan of Treatment: No further surgical intervention as per ortho.  -Pain control - pain controlled with tylenol  -NWB in RUE  -ICE and active movement of fingers/wrist/elbow  -Dr. Bhakta (ortho) f/u in 1 week outside      SECONDARY DISCHARGE DIAGNOSES  Diagnosis: Laceration of forehead  Assessment and Plan of Treatment: -Note forehead lacerations sutured. CT head/neck negative.   -Will need sutures removed 5-10 days.     PRINCIPAL DISCHARGE DIAGNOSIS  Diagnosis: Closed traumatic dislocation of right shoulder  Assessment and Plan of Treatment: You were hospitalized after a fall and seen in the ED. During your hospitalization, an Xray of your shoulder found a shoulder dislocation/fracture. Our orthopedic specialist saw you in the ED and performed a shoulder reduction to relocate your dislocated shoulder. You were placed in a sling which you should continue to use as instructed until you see your orthopedist outside the hospital and you should try your best to not bear weight on your right arm without instructions. You will also need some physical rehabilitation to help improve your range of motion and so you will be discharged to sub-acute rehab.   Please follow-up with your primary care provider within 1 week of hospital discharge for further lab-work, monitoring, medication-adjustments, and management as needed for your chronic health conditions.   Please also follow-up with the orthopedist, Dr. Bhakta, in 1 week after hospital discharge. Please call the number for his office to set up an appointment with him to continue monitoring and managing your shoulder fracture.      SECONDARY DISCHARGE DIAGNOSES  Diagnosis: Laceration of forehead  Assessment and Plan of Treatment: You were noted to have forehead lacerations after your fall which have been sutured. A CT scan of your head and neck was negative for any acute findings. The sutures should be removed within 5-10 days of hospital discharge.   Please follow-up at the orthopedist office and/or your primary care provider to have the sutures removed.

## 2022-07-12 NOTE — PATIENT PROFILE ADULT - FALL HARM RISK - HARM RISK INTERVENTIONS

## 2022-07-12 NOTE — DISCHARGE NOTE PROVIDER - CARE PROVIDER_API CALL
Monse,   Follow up with Dr. Shea within 1 week, call the office for appointment  Phone: (   )    -  Fax: (   )    -  Follow Up Time:     Abdirashid Shea (MD)  Orthopedics  [address] [place] [address]  Phone: [phone]  Fax: [phone]  Follow Up Time:    Abdirashid Shea)  Orthopedics  [address] [address]  Phone: [phone]  Fax: [phone]  Follow Up Time: 1 week    Primary Care Provider,   Please follow-up with your PCP within 1 week of hospital discharge, call their office to make an appointment  Phone: (   )    -  Fax: (   )    -  Follow Up Time:

## 2022-07-12 NOTE — DISCHARGE NOTE PROVIDER - NSDCFUADDINST_GEN_ALL_CORE_FT
-Pain control - pain controlled with tylenol -NWB in RUE -ICE and active movement of fingers/wrist/elbow -Dr. Bhakta (ortho) f/u in 1 week outside

## 2022-07-12 NOTE — H&P ADULT - ASSESSMENT
89F w/ hx of HTN, IBS, osteoporosis, anemia p/w fall and R arm pain 89F w/ hx of HTN, IBS, osteoporosis, anemia p/w fall and R arm pain found to have R shoulder dislocation and Hill-Sachs lesion/ fracture

## 2022-07-12 NOTE — DISCHARGE NOTE PROVIDER - NSDCMRMEDTOKEN_GEN_ALL_CORE_FT
dicyclomine 10 mg oral capsule: 1 cap(s) orally 4 times a day  olmesartan 40 mg oral tablet: 1 tab(s) orally once a day  Trelegy Ellipta 200 mcg-62.5 mcg-25 mcg/inh inhalation powder: 1 puff(s) inhaled once a day   acetaminophen 325 mg oral tablet: 3 tab(s) orally every 8 hours, As needed, Moderate Pain (4 - 6)  enoxaparin: 30 milligram(s) subcutaneous once a day  melatonin 3 mg oral tablet: 1 tab(s) orally once a day (at bedtime), As needed, Insomnia  olmesartan 40 mg oral tablet: 1 tab(s) orally once a day  Prolia 60 mg/mL subcutaneous solution: 1 dose(s) subcutaneous every 6 months  Trelegy Ellipta 200 mcg-62.5 mcg-25 mcg/inh inhalation powder: 1 puff(s) inhaled once a day

## 2022-07-12 NOTE — DISCHARGE NOTE PROVIDER - NSDCFUSCHEDAPPT_GEN_ALL_CORE_FT
Art Campos  [facility] Physician [facility]  Med Endocr [address] [facility]  Scheduled Appointment: 09/19/2022

## 2022-07-12 NOTE — H&P ADULT - PROBLEM SELECTOR PLAN 1
Appreciate orthopedic recommendations. NO acute surgical intervention as per ortho.  -Pain control  -NWB in RUE  -ICE and active movement of fingers/wrist/elbow  -F/u CT should results  -Dr. Bhakta f/u in 1 week outside  -Pt states she wants to be discharged home, wants to arrange for help at home  -See below regarding PT consult

## 2022-07-12 NOTE — DISCHARGE NOTE PROVIDER - NSDCCPTREATMENT_GEN_ALL_CORE_FT
PRINCIPAL PROCEDURE  Procedure: Chest xray, PA & lateral  Findings and Treatment: ACC: [id number] EXAM:  XR SHOULDER COMP MIN 2V RT                        ACC: [id number] EXAM:  XR SHOULDER AXILLARY 1 VIEW RT                        PROCEDURE DATE:  07/11/2022    INTERPRETATION:  CLINICAL INDICATION: Right shoulder pain status post   fall.  TECHNIQUE: 3 views of the right shoulder.  COMPARISON: None available.  FINDINGS:  Acute minimally displaced fracture at the greater tuberosity.   Questionable lucency at the surgical neck for which nondisplaced   transverse fracture cannot be excluded. No dislocation. Moderate   acromioclavicular arthrosis. Imaged right lung is clear.  IMPRESSION:  Acute minimally displaced fracture at the greater tuberosity.  Questionable lucency at the surgical neck for which nondisplaced   transverse fracture cannot be excluded.  Recommend correlation with ordered CT.  No dislocation.  --- End of Report ---

## 2022-07-12 NOTE — H&P ADULT - HISTORY OF PRESENT ILLNESS
89F w/ hx of HTN, IBS, osteoporosis, anemia p/w fall and R arm pain. Pt states she was in her usual state of health until today when she was having lunch with son and daughter in law. Pt was getting up from table when she thinks she tripped on table leg and fell on R side. Injured R shoulder and head. She denies any LOC or prodromal symptoms. Denies palpitations, chest pain, lightheadedness, fevers or chills.     In ER: Given LR 1L, T dap, lidocaine and tylenol. Seen by ortho under conscious sedation for reduction of shoulder

## 2022-07-12 NOTE — PROGRESS NOTE ADULT - SUBJECTIVE AND OBJECTIVE BOX
PROGRESS NOTE:   Authored by Dr. Ros Lima MD  Pager [phone]     Patient is a 89y old  Female who presents with a chief complaint of Fall and R humeral fracture (2022 02:18)      SUBJECTIVE / OVERNIGHT EVENTS: Patient seen and examined at bedside. Patient feels ok, a little sore, pain in R arm ok if not moving, relatively well controlled with tylenol.     ADDITIONAL REVIEW OF SYSTEMS:    MEDICATIONS  (STANDING):  heparin   Injectable 5000 Unit(s) SubCutaneous every 12 hours    MEDICATIONS  (PRN):  acetaminophen     Tablet .. 650 milliGRAM(s) Oral every 6 hours PRN Mild Pain (1 - 3)  melatonin 3 milliGRAM(s) Oral at bedtime PRN Insomnia  ondansetron Injectable 4 milliGRAM(s) IV Push every 8 hours PRN Nausea and/or Vomiting      CAPILLARY BLOOD GLUCOSE        I&O's Summary    2022 07:01  -  2022 11:35  --------------------------------------------------------  IN: 120 mL / OUT: 0 mL / NET: 120 mL        PHYSICAL EXAM:  Vital Signs Last 24 Hrs  T(C): 36.8 (2022 04:49), Max: 36.9 (2022 23:04)  T(F): 98.2 (2022 04:49), Max: 98.5 (2022 23:04)  HR: 74 (2022 09:02) (66 - 82)  BP: 149/83 (2022 09:02) (149/79 - 181/91)  BP(mean): 117 (2022 23:04) (97 - 117)  RR: 18 (2022 04:49) (16 - 29)  SpO2: 97% (:02) (95% - 100%)    Parameters below as of :  Patient On (Oxygen Delivery Method): room air        CONSTITUTIONAL: NAD, well-developed  RESPIRATORY: Normal respiratory effort; lungs are clear to auscultation bilaterally  CARDIOVASCULAR: Regular rate and rhythm, normal S1 and S2, no murmur/rub/gallop; No lower extremity edema; Peripheral pulses are 2+ bilaterally  ABDOMEN: Nontender to palpation, normoactive bowel sounds, no rebound/guarding; No hepatosplenomegaly  MUSCLOSKELETAL: R arm in sling, bruising. Bruising on face/forehead laceration with sutures  PSYCH: A+O to person, place, and time; affect appropriate    LABS:                        10.7   8.62  )-----------( 185      ( 2022 16:16 )             32.1     07-11    132<L>  |  98  |  26<H>  ----------------------------<  116<H>  4.4   |  20<L>  |  1.24    Ca    9.2      2022 16:16  Mg     1.9     -    TPro  6.8  /  Alb  3.9  /  TBili  0.3  /  DBili  x   /  AST  23  /  ALT  13  /  AlkPhos  38<L>  11    PT/INR - ( 2022 16:16 )   PT: 11.4 sec;   INR: 0.98 ratio         PTT - ( 2022 16:16 )  PTT:23.3 sec      Urinalysis Basic - ( 2022 21:38 )    Color: Light Yellow / Appearance: Clear / S.017 / pH: x  Gluc: x / Ketone: Small  / Bili: Negative / Urobili: Negative   Blood: x / Protein: Trace / Nitrite: Negative   Leuk Esterase: Negative / RBC: 2 /hpf / WBC 5 /HPF   Sq Epi: x / Non Sq Epi: 0 /hpf / Bacteria: Negative          RADIOLOGY & ADDITIONAL TESTS:  Results Reviewed:   Imaging Personally Reviewed:  Electrocardiogram Personally Reviewed:    COORDINATION OF CARE:  Care Discussed with Consultants/Other Providers [Y/N]:  Prior or Outpatient Records Reviewed [Y/N]:

## 2022-07-12 NOTE — DISCHARGE NOTE PROVIDER - NSDCFUADDAPPT_GEN_ALL_CORE_FT
Follow up with ortho in a week.  -Note forehead lacerations sutured. CT head/neck negative.   -Will need sutures removed 5-10 days. Please follow-up with your primary care provider within 1 week of hospital discharge for further lab-work, monitoring, medication-adjustments, and management as needed for your chronic health conditions.     Please also follow-up with orthopedics, Dr. Shea within 1 week of hospital discharge. You can call the number listed for his office to schedule your appointment at his office. You will also need to follow-up about having your forehead laceration sutures removed within 5-10 days which can be done in his office or at your PCP.

## 2022-07-12 NOTE — PHYSICAL THERAPY INITIAL EVALUATION ADULT - ACTIVE RANGE OF MOTION EXAMINATION, REHAB EVAL
RUE limited secondary to sling/Left UE Active ROM was WFL (within functional limits)/bilateral  lower extremity Active ROM was WFL (within functional limits)

## 2022-07-12 NOTE — H&P ADULT - PROBLEM SELECTOR PLAN 2
Thought to be mechanical. Pt denies hx of syncope in past. Note forehead lacerations sutured. CT head/neck negative.   -PT consult  -Falls precautions  -Need sutures removed 5-10 days?

## 2022-07-12 NOTE — PHYSICAL THERAPY INITIAL EVALUATION ADULT - STRENGTHENING, PT EVAL
Goal: Pt will improve strength one half grade to improve performance and safety of transfers and ambulation in 2 weeks.

## 2022-07-13 LAB
ALBUMIN SERPL ELPH-MCNC: 3.7 G/DL — SIGNIFICANT CHANGE UP (ref 3.3–5)
ALP SERPL-CCNC: 40 U/L — SIGNIFICANT CHANGE UP (ref 40–120)
ALT FLD-CCNC: 18 U/L — SIGNIFICANT CHANGE UP (ref 10–45)
ANION GAP SERPL CALC-SCNC: 10 MMOL/L — SIGNIFICANT CHANGE UP (ref 5–17)
AST SERPL-CCNC: 27 U/L — SIGNIFICANT CHANGE UP (ref 10–40)
BILIRUB SERPL-MCNC: 0.4 MG/DL — SIGNIFICANT CHANGE UP (ref 0.2–1.2)
BUN SERPL-MCNC: 25 MG/DL — HIGH (ref 7–23)
CALCIUM SERPL-MCNC: 8.7 MG/DL — SIGNIFICANT CHANGE UP (ref 8.4–10.5)
CHLORIDE SERPL-SCNC: 103 MMOL/L — SIGNIFICANT CHANGE UP (ref 96–108)
CO2 SERPL-SCNC: 22 MMOL/L — SIGNIFICANT CHANGE UP (ref 22–31)
CREAT SERPL-MCNC: 1.26 MG/DL — SIGNIFICANT CHANGE UP (ref 0.5–1.3)
CULTURE RESULTS: SIGNIFICANT CHANGE UP
EGFR: 40 ML/MIN/1.73M2 — LOW
GLUCOSE SERPL-MCNC: 118 MG/DL — HIGH (ref 70–99)
HCT VFR BLD CALC: 30.6 % — LOW (ref 34.5–45)
HGB BLD-MCNC: 10.2 G/DL — LOW (ref 11.5–15.5)
MAGNESIUM SERPL-MCNC: 1.9 MG/DL — SIGNIFICANT CHANGE UP (ref 1.6–2.6)
MCHC RBC-ENTMCNC: 30.7 PG — SIGNIFICANT CHANGE UP (ref 27–34)
MCHC RBC-ENTMCNC: 33.3 GM/DL — SIGNIFICANT CHANGE UP (ref 32–36)
MCV RBC AUTO: 92.2 FL — SIGNIFICANT CHANGE UP (ref 80–100)
NRBC # BLD: 0 /100 WBCS — SIGNIFICANT CHANGE UP (ref 0–0)
PHOSPHATE SERPL-MCNC: 2.9 MG/DL — SIGNIFICANT CHANGE UP (ref 2.5–4.5)
PLATELET # BLD AUTO: 168 K/UL — SIGNIFICANT CHANGE UP (ref 150–400)
POTASSIUM SERPL-MCNC: 4.4 MMOL/L — SIGNIFICANT CHANGE UP (ref 3.5–5.3)
POTASSIUM SERPL-SCNC: 4.4 MMOL/L — SIGNIFICANT CHANGE UP (ref 3.5–5.3)
PROT SERPL-MCNC: 6.5 G/DL — SIGNIFICANT CHANGE UP (ref 6–8.3)
RBC # BLD: 3.32 M/UL — LOW (ref 3.8–5.2)
RBC # FLD: 13.8 % — SIGNIFICANT CHANGE UP (ref 10.3–14.5)
SODIUM SERPL-SCNC: 135 MMOL/L — SIGNIFICANT CHANGE UP (ref 135–145)
SPECIMEN SOURCE: SIGNIFICANT CHANGE UP
WBC # BLD: 10.18 K/UL — SIGNIFICANT CHANGE UP (ref 3.8–10.5)
WBC # FLD AUTO: 10.18 K/UL — SIGNIFICANT CHANGE UP (ref 3.8–10.5)

## 2022-07-13 PROCEDURE — 99232 SBSQ HOSP IP/OBS MODERATE 35: CPT | Mod: GC

## 2022-07-13 RX ORDER — ACETAMINOPHEN 500 MG
3 TABLET ORAL
Qty: 0 | Refills: 0 | DISCHARGE
Start: 2022-07-13

## 2022-07-13 RX ORDER — POLYETHYLENE GLYCOL 3350 17 G/17G
17 POWDER, FOR SOLUTION ORAL ONCE
Refills: 0 | Status: DISCONTINUED | OUTPATIENT
Start: 2022-07-13 | End: 2022-07-13

## 2022-07-13 RX ORDER — ENOXAPARIN SODIUM 100 MG/ML
30 INJECTION SUBCUTANEOUS
Qty: 0 | Refills: 0 | DISCHARGE
Start: 2022-07-13

## 2022-07-13 RX ORDER — LANOLIN ALCOHOL/MO/W.PET/CERES
1 CREAM (GRAM) TOPICAL
Qty: 0 | Refills: 0 | DISCHARGE
Start: 2022-07-13

## 2022-07-13 RX ORDER — POLYETHYLENE GLYCOL 3350 17 G/17G
17 POWDER, FOR SOLUTION ORAL ONCE
Refills: 0 | Status: COMPLETED | OUTPATIENT
Start: 2022-07-13 | End: 2022-07-13

## 2022-07-13 RX ADMIN — ENOXAPARIN SODIUM 30 MILLIGRAM(S): 100 INJECTION SUBCUTANEOUS at 13:11

## 2022-07-13 RX ADMIN — POLYETHYLENE GLYCOL 3350 17 GRAM(S): 17 POWDER, FOR SOLUTION ORAL at 22:19

## 2022-07-13 RX ADMIN — LOSARTAN POTASSIUM 100 MILLIGRAM(S): 100 TABLET, FILM COATED ORAL at 05:06

## 2022-07-13 NOTE — PROGRESS NOTE ADULT - PROBLEM SELECTOR PLAN 3
Hgb 10.7, denies signs of bleeding  -Trend cbc  -F/u anemia work up Hgb 10.7 --> 10.2 today, denies signs of bleeding  -Trend cbc  -F/u anemia work up

## 2022-07-13 NOTE — PROGRESS NOTE ADULT - SUBJECTIVE AND OBJECTIVE BOX
Dayton Valente MD  PGY 1 Department of Internal Medicine        Patient is a 89y old  Female who presents with a chief complaint of Fall and R humeral fracture (2022 12:09)      SUBJECTIVE / OVERNIGHT EVENTS: Pt seen and examined. No acute overnight events. Denies fevers, chills, CP, SOB, Abdominal pain, N/V, Constipation, Diarrhea        MEDICATIONS  (STANDING):  enoxaparin Injectable 30 milliGRAM(s) SubCutaneous every 24 hours  losartan 100 milliGRAM(s) Oral daily    MEDICATIONS  (PRN):  acetaminophen     Tablet .. 975 milliGRAM(s) Oral every 8 hours PRN Moderate Pain (4 - 6)  melatonin 3 milliGRAM(s) Oral at bedtime PRN Insomnia      I&O's Summary    2022 07:01  -  2022 07:00  --------------------------------------------------------  IN: 120 mL / OUT: 0 mL / NET: 120 mL        Vital Signs Last 24 Hrs  T(C): 36.4 (2022 05:11), Max: 37.6 (2022 19:41)  T(F): 97.5 (2022 05:11), Max: 99.6 (2022 19:41)  HR: 82 (2022 05:11) (69 - 84)  BP: 123/78 (2022 19:41) (123/78 - 130/75)  BP(mean): --  RR: 18 (2022 05:11) (17 - 18)  SpO2: 99% (2022 05:11) (94% - 99%)    Parameters below as of 2022 05:11  Patient On (Oxygen Delivery Method): room air        CAPILLARY BLOOD GLUCOSE          PHYSICAL EXAM:  GENERAL: NAD,   HEAD:  Atraumatic, Normocephalic  EYES: EOMI, PERRL, conjunctiva and sclera clear  NECK: No JVD  CHEST/LUNG: Clear to auscultation bilaterally; No wheeze  HEART: Regular rate and rhythm; No murmurs, rubs, or gallops  ABDOMEN: Soft, Nontender, Nondistended; Bowel sounds present  EXTREMITIES:  2+ Peripheral Pulses, No clubbing, cyanosis, or edema  PSYCH: AAOx3  NEUROLOGY: non-focal  SKIN: No rashes or lesions       LABS:                        10.2   10.18 )-----------( 168      ( 2022 06:35 )             30.6     Auto Eosinophil # x     / Auto Eosinophil % x     / Auto Neutrophil # x     / Auto Neutrophil % x     / BANDS % x                            10.7   8.62  )-----------( 185      ( 2022 16:16 )             32.1     Auto Eosinophil # 0.17  / Auto Eosinophil % 2.0   / Auto Neutrophil # 5.18  / Auto Neutrophil % 60.0  / BANDS % x        07-13    135  |  103  |  25<H>  ----------------------------<  118<H>  4.4   |  22  |  1.26  0711    132<L>  |  98  |  26<H>  ----------------------------<  116<H>  4.4   |  20<L>  |  1.24    Ca    8.7      2022 06:35  Mg     1.9     07-  Phos  2.9     07-  TPro  6.5  /  Alb  3.7  /  TBili  0.4  /  DBili  x   /  AST  27  /  ALT  18  /  AlkPhos  40  07-  TPro  6.8  /  Alb  3.9  /  TBili  0.3  /  DBili  x   /  AST  23  /  ALT  13  /  AlkPhos  38<L>  07-11    PT/INR - ( 2022 16:16 )   PT: 11.4 sec;   INR: 0.98 ratio         PTT - ( 2022 16:16 )  PTT:23.3 sec      Urinalysis Basic - ( 2022 21:38 )    Color: Light Yellow / Appearance: Clear / S.017 / pH: x  Gluc: x / Ketone: Small  / Bili: Negative / Urobili: Negative   Blood: x / Protein: Trace / Nitrite: Negative   Leuk Esterase: Negative / RBC: 2 /hpf / WBC 5 /HPF   Sq Epi: x / Non Sq Epi: 0 /hpf / Bacteria: Negative            RADIOLOGY & ADDITIONAL TESTS:    Imaging Personally Reviewed:    Consultant(s) Notes Reviewed:      Care Discussed with Consultants/Other Providers:   Dayton Valente MD  PGY 1 Department of Internal Medicine        Patient is a 89y old  Female who presents with a chief complaint of Fall and R humeral fracture (2022 12:09)      SUBJECTIVE / OVERNIGHT EVENTS: Pt seen and examined. No acute overnight events. Denies fevers, chills, paresthesia, numbness, CP, SOB, Abdominal pain, N/V, Constipation, Diarrhea        MEDICATIONS  (STANDING):  enoxaparin Injectable 30 milliGRAM(s) SubCutaneous every 24 hours  losartan 100 milliGRAM(s) Oral daily    MEDICATIONS  (PRN):  acetaminophen     Tablet .. 975 milliGRAM(s) Oral every 8 hours PRN Moderate Pain (4 - 6)  melatonin 3 milliGRAM(s) Oral at bedtime PRN Insomnia      I&O's Summary    2022 07:01  -  2022 07:00  --------------------------------------------------------  IN: 120 mL / OUT: 0 mL / NET: 120 mL        Vital Signs Last 24 Hrs  T(C): 36.4 (2022 05:11), Max: 37.6 (2022 19:41)  T(F): 97.5 (2022 05:11), Max: 99.6 (2022 19:41)  HR: 82 (2022 05:11) (69 - 84)  BP: 123/78 (2022 19:41) (123/78 - 130/75)  BP(mean): --  RR: 18 (2022 05:11) (17 - 18)  SpO2: 99% (2022 05:11) (94% - 99%)    Parameters below as of 2022 05:11  Patient On (Oxygen Delivery Method): room air        CAPILLARY BLOOD GLUCOSE          PHYSICAL EXAM:  GENERAL: NAD,   HEAD:  Atraumatic, Normocephalic  EYES: EOMI, PERRL, conjunctiva and sclera clear  NECK: No JVD  CHEST/LUNG: Clear to auscultation bilaterally; No wheeze  HEART: Regular rate and rhythm; No murmurs, rubs, or gallops  ABDOMEN: Soft, Nontender, Nondistended; Bowel sounds present  EXTREMITIES:  2+ Peripheral Pulses, No clubbing, cyanosis, or edema  PSYCH: AAOx3  NEUROLOGY: non-focal, sensation and motor function in tact in all distal extremities  SKIN: No rashes or lesions  MSK: no erythema or edema of the R shoulder, currently in sling.        LABS:                        10.2   10.18 )-----------( 168      ( 2022 06:35 )             30.6     Auto Eosinophil # x     / Auto Eosinophil % x     / Auto Neutrophil # x     / Auto Neutrophil % x     / BANDS % x                            10.7   8.62  )-----------( 185      ( 2022 16:16 )             32.1     Auto Eosinophil # 0.17  / Auto Eosinophil % 2.0   / Auto Neutrophil # 5.18  / Auto Neutrophil % 60.0  / BANDS % x        07-13    135  |  103  |  25<H>  ----------------------------<  118<H>  4.4   |  22  |  1.26  07-11    132<L>  |  98  |  26<H>  ----------------------------<  116<H>  4.4   |  20<L>  |  1.24    Ca    8.7      2022 06:35  Mg     1.9     07-13  Phos  2.9     07-13  TPro  6.5  /  Alb  3.7  /  TBili  0.4  /  DBili  x   /  AST  27  /  ALT  18  /  AlkPhos  40  07-13  TPro  6.8  /  Alb  3.9  /  TBili  0.3  /  DBili  x   /  AST  23  /  ALT  13  /  AlkPhos  38<L>  07-11    PT/INR - ( 2022 16:16 )   PT: 11.4 sec;   INR: 0.98 ratio         PTT - ( 2022 16:16 )  PTT:23.3 sec      Urinalysis Basic - ( 2022 21:38 )    Color: Light Yellow / Appearance: Clear / S.017 / pH: x  Gluc: x / Ketone: Small  / Bili: Negative / Urobili: Negative   Blood: x / Protein: Trace / Nitrite: Negative   Leuk Esterase: Negative / RBC: 2 /hpf / WBC 5 /HPF   Sq Epi: x / Non Sq Epi: 0 /hpf / Bacteria: Negative            RADIOLOGY & ADDITIONAL TESTS:  ACC: [id number] EXAM:  CT 3D RECONSTRUCT W SUSHANT                        ACC: [id number] EXAM:  CT SHOULDER ONLY RT                          PROCEDURE DATE:  2022          INTERPRETATION:  CT SHOULDER RIGHT, CT 3D RECONSTRUCTION W WORKSTATION    HISTORY: Fall with right shoulder pain. Dislocation with reduction.    TECHNIQUE: Contiguous axial imaging was performed through the right   shoulder. Coronal and sagittal reformatting was utilized. 3-D   reformatting was performed.    COMPARISON:Pre and post reduction right shoulder x-rays dated 2022.    FINDINGS:    OSSEOUS STRUCTURES    Fractures:  Moderate to large Hill-Sachs lesion is present with   adjacent mildly displaced cortical fractures involving the greater   tuberosity and lateral cortex of the humeral neck. There is an additional   presumed cortical avulsion fracture of the lesser tuberosity with   inferomedial displacement of approximately 1 cm.    ROTATOR CUFF TENDONS    Rotator Cuff Tendons:  Mid to inferior portion of the subscapularis   appears to be attached to the displaced cortical fragment of the lesser   tuberosity. The remaining rotator cuff appears grossly intact within   limits of CT.    Muscle Atrophy:  None.    CORACOACROMIAL ARCH & AC JOINT    Coracohumeral Space:  Widely patent.    Acromiohumeral Space:  Widely patent.    Acromioclavicular Joint:  Mild arthrosis is present with   chondrocalcinosis.    GLENOHUMERAL JOINT    Joint Space:  Relatively maintained although small glenoid osteophytes   are noted.    Effusion:  None.    SOFT TISSUES  Mild soft tissue edema is present surrounding the fracture sites.  Mild to moderate atherosclerotic calcifications are noted.    IMAGED LUNGS  Mild apical pleural thickening is noted.  There is mild dependent atelectasis and/or scarring within the lung base.  Faint groundglass changes within the right middle lobe may reflect   atelectasis or inflammatory or infectious process in the appropriate   setting.    IMPRESSION:  1. Reduced shoulder dislocation with moderate to large Hill-Sachs lesion   and adjacent mildly displaced cortical fractures involving the greater   tuberosity and lateral cortex of the humeral neck.  2. Cortical avulsion of the lesser tuberosity that appears to involve the   midto inferior portion of the subscapularis tendon.

## 2022-07-13 NOTE — PROGRESS NOTE ADULT - PROBLEM SELECTOR PLAN 1
Appreciate orthopedic recommendations. s/p R shoulder reduction, no further surgical intervention as per ortho.  -Pain control - pain controlled with tylenol  -NWB in RUE  -ICE and active movement of fingers/wrist/elbow  -Dr. Bhakta (ortho) f/u in 1 week outside  Patient for EVA for PT/OT Appreciate orthopedic recommendations. s/p R shoulder reduction, no further surgical intervention as per ortho.  -Pain control - pain controlled with tylenol  -NWB in RUE  -ICE and active movement of fingers/wrist/elbow  -Dr. Bhakta (ortho) f/u in 1 week outside  Patient for EVA per PT/OT eval

## 2022-07-14 ENCOUNTER — TRANSCRIPTION ENCOUNTER (OUTPATIENT)
Age: 87
End: 2022-07-14

## 2022-07-14 VITALS
DIASTOLIC BLOOD PRESSURE: 82 MMHG | TEMPERATURE: 97 F | SYSTOLIC BLOOD PRESSURE: 131 MMHG | HEART RATE: 94 BPM | RESPIRATION RATE: 18 BRPM | OXYGEN SATURATION: 97 %

## 2022-07-14 PROBLEM — M81.0 AGE-RELATED OSTEOPOROSIS WITHOUT CURRENT PATHOLOGICAL FRACTURE: Chronic | Status: ACTIVE | Noted: 2022-07-12

## 2022-07-14 PROBLEM — Z87.19 PERSONAL HISTORY OF OTHER DISEASES OF THE DIGESTIVE SYSTEM: Chronic | Status: ACTIVE | Noted: 2022-07-12

## 2022-07-14 PROBLEM — I10 ESSENTIAL (PRIMARY) HYPERTENSION: Chronic | Status: ACTIVE | Noted: 2022-07-12

## 2022-07-14 LAB — SARS-COV-2 RNA SPEC QL NAA+PROBE: SIGNIFICANT CHANGE UP

## 2022-07-14 PROCEDURE — 85025 COMPLETE CBC W/AUTO DIFF WBC: CPT

## 2022-07-14 PROCEDURE — 87086 URINE CULTURE/COLONY COUNT: CPT

## 2022-07-14 PROCEDURE — 73030 X-RAY EXAM OF SHOULDER: CPT

## 2022-07-14 PROCEDURE — 99232 SBSQ HOSP IP/OBS MODERATE 35: CPT | Mod: GC

## 2022-07-14 PROCEDURE — 85730 THROMBOPLASTIN TIME PARTIAL: CPT

## 2022-07-14 PROCEDURE — 76377 3D RENDER W/INTRP POSTPROCES: CPT

## 2022-07-14 PROCEDURE — 96374 THER/PROPH/DIAG INJ IV PUSH: CPT

## 2022-07-14 PROCEDURE — 83735 ASSAY OF MAGNESIUM: CPT

## 2022-07-14 PROCEDURE — 70450 CT HEAD/BRAIN W/O DYE: CPT | Mod: MA

## 2022-07-14 PROCEDURE — 85027 COMPLETE CBC AUTOMATED: CPT

## 2022-07-14 PROCEDURE — 85610 PROTHROMBIN TIME: CPT

## 2022-07-14 PROCEDURE — U0005: CPT

## 2022-07-14 PROCEDURE — 90715 TDAP VACCINE 7 YRS/> IM: CPT

## 2022-07-14 PROCEDURE — 71045 X-RAY EXAM CHEST 1 VIEW: CPT

## 2022-07-14 PROCEDURE — 84100 ASSAY OF PHOSPHORUS: CPT

## 2022-07-14 PROCEDURE — 73020 X-RAY EXAM OF SHOULDER: CPT

## 2022-07-14 PROCEDURE — 36415 COLL VENOUS BLD VENIPUNCTURE: CPT

## 2022-07-14 PROCEDURE — 72125 CT NECK SPINE W/O DYE: CPT | Mod: MA

## 2022-07-14 PROCEDURE — 99285 EMERGENCY DEPT VISIT HI MDM: CPT

## 2022-07-14 PROCEDURE — 81001 URINALYSIS AUTO W/SCOPE: CPT

## 2022-07-14 PROCEDURE — 97161 PT EVAL LOW COMPLEX 20 MIN: CPT

## 2022-07-14 PROCEDURE — G0378: CPT

## 2022-07-14 PROCEDURE — U0003: CPT

## 2022-07-14 PROCEDURE — 73060 X-RAY EXAM OF HUMERUS: CPT

## 2022-07-14 PROCEDURE — 72170 X-RAY EXAM OF PELVIS: CPT

## 2022-07-14 PROCEDURE — 80053 COMPREHEN METABOLIC PANEL: CPT

## 2022-07-14 PROCEDURE — 73200 CT UPPER EXTREMITY W/O DYE: CPT | Mod: MA

## 2022-07-14 RX ORDER — CX-024414 0.2 MG/ML
0.5 INJECTION, SUSPENSION INTRAMUSCULAR ONCE
Refills: 0 | Status: DISCONTINUED | OUTPATIENT
Start: 2022-07-14 | End: 2022-07-14

## 2022-07-14 RX ORDER — BNT162B2 0.23 MG/2.25ML
0.3 INJECTION, SUSPENSION INTRAMUSCULAR ONCE
Refills: 0 | Status: COMPLETED | OUTPATIENT
Start: 2022-07-14 | End: 2022-07-14

## 2022-07-14 RX ADMIN — Medication 975 MILLIGRAM(S): at 12:53

## 2022-07-14 RX ADMIN — ENOXAPARIN SODIUM 30 MILLIGRAM(S): 100 INJECTION SUBCUTANEOUS at 12:56

## 2022-07-14 RX ADMIN — LOSARTAN POTASSIUM 100 MILLIGRAM(S): 100 TABLET, FILM COATED ORAL at 05:25

## 2022-07-14 RX ADMIN — BNT162B2 0.3 MILLILITER(S): 0.23 INJECTION, SUSPENSION INTRAMUSCULAR at 12:55

## 2022-07-14 NOTE — PROGRESS NOTE ADULT - ATTENDING COMMENTS
Patient seen and examined at bedside. No acute events overnight. Pain controlled & currently playing [employer]. No concern for neurovascular compromise at this time. Patient pending EVA.
Patient seen and examined at bedside. No acute events overnight. Pain controlled with just Tylenol. Patient to follow up outpatient with ortho for humerus fracture that has been reduced. Recommended for EVA and medically optimized.

## 2022-07-14 NOTE — PROGRESS NOTE ADULT - ASSESSMENT
89F w/ hx of HTN, IBS, osteoporosis, anemia p/w fall and R arm pain found to have R shoulder dislocation and Hill-Sachs lesion/ fracture s/p reduction by ortho in ED, PT rec for EVA, patient agrees, medically cleared for discharge, CM working on dispo to EVA/auth 89F w/ hx of HTN, IBS, osteoporosis, anemia p/w fall and R arm pain found to have R shoulder dislocation and Hill-Sachs lesion/ fracture s/p reduction by ortho in ED, PT rec for EVA, patient agrees, medically cleared for discharge, dispo to [facility] today 07/14

## 2022-07-14 NOTE — PROGRESS NOTE ADULT - PROBLEM SELECTOR PLAN 4
Patient follows closely with her PMD, would like to get TTE as outpatient

## 2022-07-14 NOTE — PROGRESS NOTE ADULT - PROBLEM SELECTOR PLAN 6
-Takes dicyclomine - recently filled, will order 10mg qid PRN

## 2022-07-14 NOTE — PROGRESS NOTE ADULT - PROBLEM SELECTOR PLAN 2
Mechanical fall. Pt denies hx of syncope in past. Note forehead lacerations sutured. CT head/neck negative.   -Will need sutures removed 5-10 days

## 2022-07-14 NOTE — PROGRESS NOTE ADULT - SUBJECTIVE AND OBJECTIVE BOX
Dayton Valente MD  PGY 1 Department of Internal Medicine        Patient is a 89y old  Female who presents with a chief complaint of Fall and R humeral fracture (13 Jul 2022 11:10)      SUBJECTIVE / OVERNIGHT EVENTS: Pt seen and examined. No acute overnight events. Denies fevers, chills, CP, SOB, Abdominal pain, N/V, Constipation, Diarrhea        MEDICATIONS  (STANDING):  enoxaparin Injectable 30 milliGRAM(s) SubCutaneous every 24 hours  losartan 100 milliGRAM(s) Oral daily    MEDICATIONS  (PRN):  acetaminophen     Tablet .. 975 milliGRAM(s) Oral every 8 hours PRN Moderate Pain (4 - 6)  melatonin 3 milliGRAM(s) Oral at bedtime PRN Insomnia      I&O's Summary    13 Jul 2022 07:01  -  14 Jul 2022 07:00  --------------------------------------------------------  IN: 0 mL / OUT: 50 mL / NET: -50 mL        Vital Signs Last 24 Hrs  T(C): 36.3 (14 Jul 2022 05:20), Max: 36.6 (13 Jul 2022 14:13)  T(F): 97.3 (14 Jul 2022 05:20), Max: 97.8 (13 Jul 2022 14:13)  HR: 75 (14 Jul 2022 05:20) (72 - 81)  BP: 142/80 (14 Jul 2022 05:20) (130/75 - 160/92)  BP(mean): --  RR: 18 (14 Jul 2022 05:20) (18 - 18)  SpO2: 96% (14 Jul 2022 05:20) (96% - 98%)    Parameters below as of 14 Jul 2022 05:20  Patient On (Oxygen Delivery Method): room air        CAPILLARY BLOOD GLUCOSE          PHYSICAL EXAM:  GENERAL: NAD,   HEAD:  Atraumatic, Normocephalic  EYES: EOMI, PERRL, conjunctiva and sclera clear  NECK: No JVD  CHEST/LUNG: Clear to auscultation bilaterally; No wheeze  HEART: Regular rate and rhythm; No murmurs, rubs, or gallops  ABDOMEN: Soft, Nontender, Nondistended; Bowel sounds present  EXTREMITIES:  2+ Peripheral Pulses, No clubbing, cyanosis, or edema  PSYCH: AAOx3  NEUROLOGY: non-focal  SKIN: No rashes or lesions       LABS:                        10.2   10.18 )-----------( 168      ( 13 Jul 2022 06:35 )             30.6     Auto Eosinophil # x     / Auto Eosinophil % x     / Auto Neutrophil # x     / Auto Neutrophil % x     / BANDS % x        07-13    135  |  103  |  25<H>  ----------------------------<  118<H>  4.4   |  22  |  1.26    Ca    8.7      13 Jul 2022 06:35  Mg     1.9     07-13  Phos  2.9     07-13  TPro  6.5  /  Alb  3.7  /  TBili  0.4  /  DBili  x   /  AST  27  /  ALT  18  /  AlkPhos  40  07-13                  RADIOLOGY & ADDITIONAL TESTS:    Imaging Personally Reviewed:    Consultant(s) Notes Reviewed:      Care Discussed with Consultants/Other Providers:   Dayton Valente MD  PGY 1 Department of Internal Medicine        Patient is a 89y old  Female who presents with a chief complaint of Fall and R humeral fracture (13 Jul 2022 11:10)      SUBJECTIVE / OVERNIGHT EVENTS: Pt seen and examined. No acute overnight events. Denies fevers, chills, CP, SOB, Abdominal pain, N/V, Constipation, Diarrhea        MEDICATIONS  (STANDING):  enoxaparin Injectable 30 milliGRAM(s) SubCutaneous every 24 hours  losartan 100 milliGRAM(s) Oral daily    MEDICATIONS  (PRN):  acetaminophen     Tablet .. 975 milliGRAM(s) Oral every 8 hours PRN Moderate Pain (4 - 6)  melatonin 3 milliGRAM(s) Oral at bedtime PRN Insomnia      I&O's Summary    13 Jul 2022 07:01  -  14 Jul 2022 07:00  --------------------------------------------------------  IN: 0 mL / OUT: 50 mL / NET: -50 mL        Vital Signs Last 24 Hrs  T(C): 36.3 (14 Jul 2022 05:20), Max: 36.6 (13 Jul 2022 14:13)  T(F): 97.3 (14 Jul 2022 05:20), Max: 97.8 (13 Jul 2022 14:13)  HR: 75 (14 Jul 2022 05:20) (72 - 81)  BP: 142/80 (14 Jul 2022 05:20) (130/75 - 160/92)  BP(mean): --  RR: 18 (14 Jul 2022 05:20) (18 - 18)  SpO2: 96% (14 Jul 2022 05:20) (96% - 98%)    Parameters below as of 14 Jul 2022 05:20  Patient On (Oxygen Delivery Method): room air        CAPILLARY BLOOD GLUCOSE          PHYSICAL EXAM:  GENERAL: NAD,   HEAD:  (+) periorbital ecchymoses, Normocephalic  EYES: EOMI, PERRL, conjunctiva and sclera clear  NECK: No JVD  CHEST/LUNG: Clear to auscultation bilaterally; No wheeze  HEART: Regular rate and rhythm; No murmurs, rubs, or gallops  ABDOMEN: Soft, Nontender, Nondistended; Bowel sounds present  EXTREMITIES:  (+) RUE in sling. 2+ Peripheral Pulses, No clubbing, cyanosis, or edema  PSYCH: AAOx3  NEUROLOGY: non-focal  SKIN: No rashes or lesions       LABS:                        10.2   10.18 )-----------( 168      ( 13 Jul 2022 06:35 )             30.6     Auto Eosinophil # x     / Auto Eosinophil % x     / Auto Neutrophil # x     / Auto Neutrophil % x     / BANDS % x        07-13    135  |  103  |  25<H>  ----------------------------<  118<H>  4.4   |  22  |  1.26    Ca    8.7      13 Jul 2022 06:35  Mg     1.9     07-13  Phos  2.9     07-13  TPro  6.5  /  Alb  3.7  /  TBili  0.4  /  DBili  x   /  AST  27  /  ALT  18  /  AlkPhos  40  07-13                  RADIOLOGY & ADDITIONAL TESTS:    Imaging Personally Reviewed:    Consultant(s) Notes Reviewed:      Care Discussed with Consultants/Other Providers:

## 2022-07-14 NOTE — DISCHARGE NOTE NURSING/CASE MANAGEMENT/SOCIAL WORK - NSDCVIVACCINE_GEN_ALL_CORE_FT
COVID-19, mRNA, LNP-S, PF, 30 mcg/0.3 mL dose, akil-sucrose (Pfizer); 14-Jul-2022 12:55; Kathleen Pabon (RN); [employer]; [id number] (Exp. Date: 29-Aug-2022); IntraMuscular; Deltoid Left.; 0.3 milliLiter(s);   Tdap; 11-Jul-2022 18:18; Carlita Chavis (RN); [employer]; [id number] (Exp. Date: 18-Apr-2024); IntraMuscular; Deltoid Left.; 0.5 milliLiter(s); VIS (VIS Published: 09-May-2013, VIS Presented: 11-Jul-2022);

## 2022-07-14 NOTE — DISCHARGE NOTE NURSING/CASE MANAGEMENT/SOCIAL WORK - NSDCFUADDAPPT_GEN_ALL_CORE_FT
Please follow-up with your primary care provider within 1 week of hospital discharge for further lab-work, monitoring, medication-adjustments, and management as needed for your chronic health conditions.     Please also follow-up with orthopedics, Dr. Shea within 1 week of hospital discharge. You can call the number listed for his office to schedule your appointment at his office. You will also need to follow-up about having your forehead laceration sutures removed within 5-10 days which can be done in his office or at your PCP.

## 2022-07-14 NOTE — DISCHARGE NOTE NURSING/CASE MANAGEMENT/SOCIAL WORK - PATIENT PORTAL LINK FT
You can access the FollowMyHealth Patient Portal offered by [facility] by registering at the following website: [email or web address]. By joining [employer]’s FollowMyHealth portal, you will also be able to view your health information using other applications (apps) compatible with our system.

## 2022-07-14 NOTE — PROGRESS NOTE ADULT - PROBLEM SELECTOR PLAN 5
-BP elevated, at home on olmesartan, amlodipine, and chlorthalidone but unsure of doses, if still taking.   Pharm tech to complete med rec

## 2022-07-14 NOTE — PROGRESS NOTE ADULT - PROBLEM SELECTOR PLAN 1
Appreciate orthopedic recommendations. s/p R shoulder reduction, no further surgical intervention as per ortho.  -Pain control - pain controlled with tylenol  -NWB in RUE  -ICE and active movement of fingers/wrist/elbow  -Dr. Bhakta (ortho) f/u in 1 week outside  Patient for EVA per PT/OT eval

## 2022-07-18 ENCOUNTER — INPATIENT (INPATIENT)
Facility: HOSPITAL | Age: 87
LOS: 9 days | Discharge: HOME HEALTH | DRG: 871 | End: 2022-07-28
Attending: INTERNAL MEDICINE | Admitting: HOSPITALIST
Payer: MEDICARE

## 2022-07-18 VITALS
OXYGEN SATURATION: 97 % | HEIGHT: 61 IN | WEIGHT: 119.93 LBS | DIASTOLIC BLOOD PRESSURE: 53 MMHG | HEART RATE: 104 BPM | TEMPERATURE: 102 F | RESPIRATION RATE: 24 BRPM | SYSTOLIC BLOOD PRESSURE: 113 MMHG

## 2022-07-18 DIAGNOSIS — A41.9 SEPSIS, UNSPECIFIED ORGANISM: ICD-10-CM

## 2022-07-18 LAB
ALBUMIN SERPL ELPH-MCNC: 3.4 G/DL — SIGNIFICANT CHANGE UP (ref 3.3–5)
ALP SERPL-CCNC: 105 U/L — SIGNIFICANT CHANGE UP (ref 40–120)
ALT FLD-CCNC: 31 U/L — SIGNIFICANT CHANGE UP (ref 10–45)
ANION GAP SERPL CALC-SCNC: 16 MMOL/L — SIGNIFICANT CHANGE UP (ref 5–17)
ANISOCYTOSIS BLD QL: SLIGHT — SIGNIFICANT CHANGE UP
APPEARANCE UR: ABNORMAL
APTT BLD: 27.1 SEC — LOW (ref 27.5–35.5)
AST SERPL-CCNC: 40 U/L — SIGNIFICANT CHANGE UP (ref 10–40)
BACTERIA # UR AUTO: ABNORMAL
BASE EXCESS BLDV CALC-SCNC: -7.6 MMOL/L — LOW (ref -2–2)
BASOPHILS # BLD AUTO: 0 K/UL — SIGNIFICANT CHANGE UP (ref 0–0.2)
BASOPHILS NFR BLD AUTO: 0 % — SIGNIFICANT CHANGE UP (ref 0–2)
BILIRUB SERPL-MCNC: 0.7 MG/DL — SIGNIFICANT CHANGE UP (ref 0.2–1.2)
BILIRUB UR-MCNC: NEGATIVE — SIGNIFICANT CHANGE UP
BLOOD GAS VENOUS - CREATININE: SIGNIFICANT CHANGE UP MG/DL (ref 0.5–1.3)
BUN SERPL-MCNC: 39 MG/DL — HIGH (ref 7–23)
BURR CELLS BLD QL SMEAR: PRESENT — SIGNIFICANT CHANGE UP
CA-I SERPL-SCNC: 1.13 MMOL/L — LOW (ref 1.15–1.33)
CALCIUM SERPL-MCNC: 8.3 MG/DL — LOW (ref 8.4–10.5)
CHLORIDE BLDV-SCNC: 94 MMOL/L — LOW (ref 96–108)
CHLORIDE SERPL-SCNC: 92 MMOL/L — LOW (ref 96–108)
CO2 BLDV-SCNC: 18 MMOL/L — LOW (ref 22–26)
CO2 SERPL-SCNC: 17 MMOL/L — LOW (ref 22–31)
COLOR SPEC: YELLOW — SIGNIFICANT CHANGE UP
CREAT SERPL-MCNC: 1.7 MG/DL — HIGH (ref 0.5–1.3)
DIFF PNL FLD: ABNORMAL
EGFR: 28 ML/MIN/1.73M2 — LOW
EOSINOPHIL # BLD AUTO: 0.12 K/UL — SIGNIFICANT CHANGE UP (ref 0–0.5)
EOSINOPHIL NFR BLD AUTO: 0.9 % — SIGNIFICANT CHANGE UP (ref 0–6)
EPI CELLS # UR: 1 /HPF — SIGNIFICANT CHANGE UP
GAS PNL BLDV: 124 MMOL/L — LOW (ref 136–145)
GAS PNL BLDV: SIGNIFICANT CHANGE UP
GAS PNL BLDV: SIGNIFICANT CHANGE UP
GLUCOSE BLDV-MCNC: 132 MG/DL — HIGH (ref 70–99)
GLUCOSE SERPL-MCNC: 134 MG/DL — HIGH (ref 70–99)
GLUCOSE UR QL: NEGATIVE — SIGNIFICANT CHANGE UP
HCO3 BLDV-SCNC: 17 MMOL/L — LOW (ref 22–29)
HCT VFR BLD CALC: 24 % — LOW (ref 34.5–45)
HCT VFR BLDA CALC: 31 % — LOW (ref 34.5–46.5)
HGB BLD CALC-MCNC: 10.3 G/DL — LOW (ref 11.7–16.1)
HGB BLD-MCNC: 8 G/DL — LOW (ref 11.5–15.5)
HYALINE CASTS # UR AUTO: 31 /LPF — HIGH (ref 0–2)
INR BLD: 1.08 RATIO — SIGNIFICANT CHANGE UP (ref 0.88–1.16)
KETONES UR-MCNC: NEGATIVE — SIGNIFICANT CHANGE UP
LACTATE BLDV-MCNC: 2.4 MMOL/L — HIGH (ref 0.7–2)
LACTATE SERPL-SCNC: 1.7 MMOL/L — SIGNIFICANT CHANGE UP (ref 0.7–2)
LEUKOCYTE ESTERASE UR-ACNC: ABNORMAL
LYMPHOCYTES # BLD AUTO: 0.22 K/UL — LOW (ref 1–3.3)
LYMPHOCYTES # BLD AUTO: 1.7 % — LOW (ref 13–44)
MACROCYTES BLD QL: SLIGHT — SIGNIFICANT CHANGE UP
MANUAL SMEAR VERIFICATION: SIGNIFICANT CHANGE UP
MCHC RBC-ENTMCNC: 30.4 PG — SIGNIFICANT CHANGE UP (ref 27–34)
MCHC RBC-ENTMCNC: 33.3 GM/DL — SIGNIFICANT CHANGE UP (ref 32–36)
MCV RBC AUTO: 91.3 FL — SIGNIFICANT CHANGE UP (ref 80–100)
MONOCYTES # BLD AUTO: 0.33 K/UL — SIGNIFICANT CHANGE UP (ref 0–0.9)
MONOCYTES NFR BLD AUTO: 2.6 % — SIGNIFICANT CHANGE UP (ref 2–14)
NEUTROPHILS # BLD AUTO: 12.04 K/UL — HIGH (ref 1.8–7.4)
NEUTROPHILS NFR BLD AUTO: 91.3 % — HIGH (ref 43–77)
NEUTS BAND # BLD: 2.6 % — SIGNIFICANT CHANGE UP (ref 0–8)
NITRITE UR-MCNC: NEGATIVE — SIGNIFICANT CHANGE UP
OVALOCYTES BLD QL SMEAR: SLIGHT — SIGNIFICANT CHANGE UP
PCO2 BLDV: 32 MMHG — LOW (ref 39–42)
PH BLDV: 7.34 — SIGNIFICANT CHANGE UP (ref 7.32–7.43)
PH UR: 6 — SIGNIFICANT CHANGE UP (ref 5–8)
PLAT MORPH BLD: NORMAL — SIGNIFICANT CHANGE UP
PLATELET # BLD AUTO: 206 K/UL — SIGNIFICANT CHANGE UP (ref 150–400)
PO2 BLDV: 42 MMHG — SIGNIFICANT CHANGE UP (ref 25–45)
POIKILOCYTOSIS BLD QL AUTO: SLIGHT — SIGNIFICANT CHANGE UP
POLYCHROMASIA BLD QL SMEAR: SLIGHT — SIGNIFICANT CHANGE UP
POTASSIUM BLDV-SCNC: 4.6 MMOL/L — SIGNIFICANT CHANGE UP (ref 3.5–5.1)
POTASSIUM SERPL-MCNC: 4.5 MMOL/L — SIGNIFICANT CHANGE UP (ref 3.5–5.3)
POTASSIUM SERPL-SCNC: 4.5 MMOL/L — SIGNIFICANT CHANGE UP (ref 3.5–5.3)
PROT SERPL-MCNC: 6.8 G/DL — SIGNIFICANT CHANGE UP (ref 6–8.3)
PROT UR-MCNC: ABNORMAL
PROTHROM AB SERPL-ACNC: 12.4 SEC — SIGNIFICANT CHANGE UP (ref 10.5–13.4)
RAPID RVP RESULT: SIGNIFICANT CHANGE UP
RBC # BLD: 2.63 M/UL — LOW (ref 3.8–5.2)
RBC # FLD: 14.2 % — SIGNIFICANT CHANGE UP (ref 10.3–14.5)
RBC BLD AUTO: ABNORMAL
RBC CASTS # UR COMP ASSIST: 3 /HPF — SIGNIFICANT CHANGE UP (ref 0–4)
SAO2 % BLDV: 72 % — SIGNIFICANT CHANGE UP (ref 67–88)
SARS-COV-2 RNA SPEC QL NAA+PROBE: SIGNIFICANT CHANGE UP
SCHISTOCYTES BLD QL AUTO: SLIGHT — SIGNIFICANT CHANGE UP
SODIUM SERPL-SCNC: 125 MMOL/L — LOW (ref 135–145)
SP GR SPEC: 1.02 — SIGNIFICANT CHANGE UP (ref 1.01–1.02)
UROBILINOGEN FLD QL: NEGATIVE — SIGNIFICANT CHANGE UP
VARIANT LYMPHS # BLD: 0.9 % — SIGNIFICANT CHANGE UP (ref 0–6)
WBC # BLD: 12.82 K/UL — HIGH (ref 3.8–10.5)
WBC # FLD AUTO: 12.82 K/UL — HIGH (ref 3.8–10.5)
WBC UR QL: 76 /HPF — HIGH (ref 0–5)

## 2022-07-18 PROCEDURE — 99285 EMERGENCY DEPT VISIT HI MDM: CPT

## 2022-07-18 PROCEDURE — 71045 X-RAY EXAM CHEST 1 VIEW: CPT | Mod: 26

## 2022-07-18 RX ORDER — PIPERACILLIN AND TAZOBACTAM 4; .5 G/20ML; G/20ML
1000 INJECTION, POWDER, LYOPHILIZED, FOR SOLUTION INTRAVENOUS ONCE
Refills: 0 | Status: DISCONTINUED | OUTPATIENT
Start: 2022-07-18 | End: 2022-07-18

## 2022-07-18 RX ORDER — VANCOMYCIN HCL 1 G
1000 VIAL (EA) INTRAVENOUS ONCE
Refills: 0 | Status: COMPLETED | OUTPATIENT
Start: 2022-07-18 | End: 2022-07-18

## 2022-07-18 RX ORDER — PIPERACILLIN AND TAZOBACTAM 4; .5 G/20ML; G/20ML
3.38 INJECTION, POWDER, LYOPHILIZED, FOR SOLUTION INTRAVENOUS ONCE
Refills: 0 | Status: COMPLETED | OUTPATIENT
Start: 2022-07-18 | End: 2022-07-18

## 2022-07-18 RX ORDER — SODIUM CHLORIDE 9 MG/ML
500 INJECTION, SOLUTION INTRAVENOUS ONCE
Refills: 0 | Status: COMPLETED | OUTPATIENT
Start: 2022-07-18 | End: 2022-07-18

## 2022-07-18 RX ORDER — ACETAMINOPHEN 500 MG
1000 TABLET ORAL ONCE
Refills: 0 | Status: COMPLETED | OUTPATIENT
Start: 2022-07-18 | End: 2022-07-18

## 2022-07-18 RX ORDER — SODIUM CHLORIDE 9 MG/ML
1000 INJECTION, SOLUTION INTRAVENOUS ONCE
Refills: 0 | Status: COMPLETED | OUTPATIENT
Start: 2022-07-18 | End: 2022-07-18

## 2022-07-18 RX ADMIN — PIPERACILLIN AND TAZOBACTAM 200 GRAM(S): 4; .5 INJECTION, POWDER, LYOPHILIZED, FOR SOLUTION INTRAVENOUS at 20:36

## 2022-07-18 RX ADMIN — Medication 400 MILLIGRAM(S): at 20:13

## 2022-07-18 RX ADMIN — SODIUM CHLORIDE 1000 MILLILITER(S): 9 INJECTION, SOLUTION INTRAVENOUS at 22:30

## 2022-07-18 RX ADMIN — Medication 250 MILLIGRAM(S): at 21:36

## 2022-07-18 RX ADMIN — Medication 1000 MILLIGRAM(S): at 20:27

## 2022-07-18 RX ADMIN — SODIUM CHLORIDE 1000 MILLILITER(S): 9 INJECTION, SOLUTION INTRAVENOUS at 20:12

## 2022-07-18 RX ADMIN — SODIUM CHLORIDE 500 MILLILITER(S): 9 INJECTION, SOLUTION INTRAVENOUS at 23:21

## 2022-07-18 NOTE — CHART NOTE - NSCHARTNOTEFT_GEN_A_CORE
MAR Sepsis      TO BE COMPLETED WITHIN 6 HOURS OF INITIAL ASSESSMENT:    For use in patients that have 2 sepsis criteria and new organ dysfunction   •	New or increased oxygen requirement  •	Creatinine >2mg/dL  •	Bilirubin>2mg/dL  •	Platelet <100,000/mm3  •	INR >1.5, PTT>60  •	Lactate >2    If patient persistent hypotension (SBP<90) or any lactate >4 then provider evaluation (Physician/PA/NP) within 30 minutes of bolus completion is required.    Vital Signs Last 24 Hrs  T(C): 38.6 (18 Jul 2022 19:45), Max: 39.1 (18 Jul 2022 19:06)  T(F): 101.4 (18 Jul 2022 19:45), Max: 102.3 (18 Jul 2022 19:06)  HR: 77 (18 Jul 2022 22:20) (77 - 112)  BP: 101/46 (18 Jul 2022 22:20) (90/58 - 113/53)  BP(mean): 63 (18 Jul 2022 22:20) (63 - 69)  RR: 17 (18 Jul 2022 22:20) (17 - 24)  SpO2: 100% (18 Jul 2022 22:20) (94% - 100%)    Parameters below as of 18 Jul 2022 22:20  Patient On (Oxygen Delivery Method): nasal cannula  O2 Flow (L/min): 2      		  LUNGS:  [  ] Clear bilaterally [  ] Wheeze [  ] Rhonchi [  ] Rales [  ] Crackles; Other:  HEART: [  ]RRR [  ] No murmur [  ]  Normal S1S2 [  ] Tachycardia;  Other:  CAPILLARY REFILL:  	Fingers: [  ] less than 2 seconds [  ] more than 2 seconds                                           Toes: [  ]  less than 2 seconds [  ] more than 2 seconds   PERIPHERAL PULSES:  Radial: [  ] Palpable  [  ]  non-palpable                                         Dorsalis Pedis: [  ] Palpable  [  ] non-palpable                                         Posterior Tibial: [  ] Palpable  [  ] non-palpable                                          Other:  SKIN:   [  ]  Diaphoretic  [  ]  Mottling  [  ]  Cold extremities  [  ]  Warm [  ]  Dry                      Other:    BEDSIDE ULTRASOUND FINDINGS (IF APPLICABLE):    Labs:  18 Jul 2022 20:03    125    |  92     |  39     ----------------------------<  134    4.5     |  17     |  1.70     Ca    8.3        18 Jul 2022 20:03    TPro  6.8    /  Alb  3.4    /  TBili  0.7    /  DBili  x      /  AST  40     /  ALT  31     /  AlkPhos  105    18 Jul 2022 20:03                          8.0    12.82 )-----------( 206      ( 18 Jul 2022 20:03 )             24.0     PT/INR - ( 18 Jul 2022 20:03 )   PT: 12.4 sec;   INR: 1.08 ratio         PTT - ( 18 Jul 2022 20:03 )  PTT:27.1 sec  Lactate:    [ ] I have re-evaluated the patient's fluid status and reviewed vital signs. Clinical evaluation demonstrates an appropriate response to fluid resuscitation, with subsequent plan as follows:    The patient was not given 30ml/kg of fluid because it would be detrimental to the patient’s condition despite having hypotension, lactate = 4, and/or septic shock because:    [ ] Advanced stage heart failure (symptoms with minimal exertion or symptoms at rest)  [ ] Advanced stage kidney disease with GFR < 30 ml/min    [ ] obesity BMI > 30, patient received 30 cc/kg according to Ideal Body Weight     Patient received :    Plan (orders must be placed in EMR):     [  ]  Check Repeat Lactate   [  ]  No change in current plan  [  ]  Start Vasopressors:  [  ]  Repeat Fluid Bolus:  [  ] other:    Care Discussed with Consultants/Other Providers [ ] YES  [ ] NO MAR Sepsis      TO BE COMPLETED WITHIN 6 HOURS OF INITIAL ASSESSMENT:    For use in patients that have 2 sepsis criteria and new organ dysfunction   •	New or increased oxygen requirement  •	Creatinine >2mg/dL  •	Bilirubin>2mg/dL  •	Platelet <100,000/mm3  •	INR >1.5, PTT>60  •	Lactate >2    If patient persistent hypotension (SBP<90) or any lactate >4 then provider evaluation (Physician/PA/NP) within 30 minutes of bolus completion is required.    Vital Signs Last 24 Hrs  T(C): 38.6 (18 Jul 2022 19:45), Max: 39.1 (18 Jul 2022 19:06)  T(F): 101.4 (18 Jul 2022 19:45), Max: 102.3 (18 Jul 2022 19:06)  HR: 77 (18 Jul 2022 22:20) (77 - 112)  BP: 101/46 (18 Jul 2022 22:20) (90/58 - 113/53)  BP(mean): 63 (18 Jul 2022 22:20) (63 - 69)  RR: 17 (18 Jul 2022 22:20) (17 - 24)  SpO2: 100% (18 Jul 2022 22:20) (94% - 100%)    Parameters below as of 18 Jul 2022 22:20  Patient On (Oxygen Delivery Method): nasal cannula  O2 Flow (L/min): 2      		  LUNGS:  [  ] Clear bilaterally [  ] Wheeze [  ] Rhonchi [  ] Rales [  ] Crackles; Other: decreased at bases  HEART: [ x ]RRR [  ] No murmur [  ]  Normal S1S2 [  ] Tachycardia;  Other:  CAPILLARY REFILL:  	Fingers: [  x] less than 2 seconds [  ] more than 2 seconds                                           Toes: [x  ]  less than 2 seconds [  ] more than 2 seconds   PERIPHERAL PULSES:  Radial: [ x ] Palpable  [  ]  non-palpable                                         Dorsalis Pedis: [x  ] Palpable  [  ] non-palpable                                         Posterior Tibial: [x  ] Palpable  [  ] non-palpable                                          Other:  SKIN:   [  ]  Diaphoretic  [  ]  Mottling  [  ]  Cold extremities  [ x ]  Warm [  ]  Dry                      Other:    BEDSIDE ULTRASOUND FINDINGS (IF APPLICABLE):    Labs:  18 Jul 2022 20:03    125    |  92     |  39     ----------------------------<  134    4.5     |  17     |  1.70     Ca    8.3        18 Jul 2022 20:03    TPro  6.8    /  Alb  3.4    /  TBili  0.7    /  DBili  x      /  AST  40     /  ALT  31     /  AlkPhos  105    18 Jul 2022 20:03                          8.0    12.82 )-----------( 206      ( 18 Jul 2022 20:03 )             24.0     PT/INR - ( 18 Jul 2022 20:03 )   PT: 12.4 sec;   INR: 1.08 ratio         PTT - ( 18 Jul 2022 20:03 )  PTT:27.1 sec  Lactate:    [ ] I have re-evaluated the patient's fluid status and reviewed vital signs. Clinical evaluation demonstrates an appropriate response to fluid resuscitation, with subsequent plan as follows:    The patient was not given 30ml/kg of fluid because it would be detrimental to the patient’s condition despite having hypotension, lactate = 4, and/or septic shock because:    [ ] Advanced stage heart failure (symptoms with minimal exertion or symptoms at rest)  [ ] Advanced stage kidney disease with GFR < 30 ml/min    [ ] obesity BMI > 30, patient received 30 cc/kg according to Ideal Body Weight     Patient received :    Plan (orders must be placed in EMR):     [ x ]  Check Repeat Lactate   [  ]  No change in current plan  [  ]  Start Vasopressors:  [  ]  Repeat Fluid Bolus:  [  ] other:    Care Discussed with Consultants/Other Providers [ ] YES  [ ] NO MAR Sepsis Note      TO BE COMPLETED WITHIN 6 HOURS OF INITIAL ASSESSMENT:    For use in patients that have 2 sepsis criteria and new organ dysfunction   •	New or increased oxygen requirement  •	Creatinine >2mg/dL  •	Bilirubin>2mg/dL  •	Platelet <100,000/mm3  •	INR >1.5, PTT>60  •	Lactate >2    If patient persistent hypotension (SBP<90) or any lactate >4 then provider evaluation (Physician/PA/NP) within 30 minutes of bolus completion is required.    Vital Signs Last 24 Hrs  T(C): 38.6 (18 Jul 2022 19:45), Max: 39.1 (18 Jul 2022 19:06)  T(F): 101.4 (18 Jul 2022 19:45), Max: 102.3 (18 Jul 2022 19:06)  HR: 77 (18 Jul 2022 22:20) (77 - 112)  BP: 101/46 (18 Jul 2022 22:20) (90/58 - 113/53)  BP(mean): 63 (18 Jul 2022 22:20) (63 - 69)  RR: 17 (18 Jul 2022 22:20) (17 - 24)  SpO2: 100% (18 Jul 2022 22:20) (94% - 100%)    Parameters below as of 18 Jul 2022 22:20  Patient On (Oxygen Delivery Method): nasal cannula  O2 Flow (L/min): 2      		  LUNGS:  [  ] Clear bilaterally [  ] Wheeze [  ] Rhonchi [  ] Rales [  ] Crackles; Other: decreased at bases  HEART: [ x ]RRR [  ] No murmur [  ]  Normal S1S2 [  ] Tachycardia;  Other:  CAPILLARY REFILL:  	Fingers: [  x] less than 2 seconds [  ] more than 2 seconds                                           Toes: [x  ]  less than 2 seconds [  ] more than 2 seconds   PERIPHERAL PULSES:  Radial: [ x ] Palpable  [  ]  non-palpable                                         Dorsalis Pedis: [x  ] Palpable  [  ] non-palpable                                         Posterior Tibial: [x  ] Palpable  [  ] non-palpable                                          Other:  SKIN:   [  ]  Diaphoretic  [  ]  Mottling  [  ]  Cold extremities  [ x ]  Warm [  ]  Dry                      Other:    BEDSIDE ULTRASOUND FINDINGS (IF APPLICABLE):    Labs:  18 Jul 2022 20:03    125    |  92     |  39     ----------------------------<  134    4.5     |  17     |  1.70     Ca    8.3        18 Jul 2022 20:03    TPro  6.8    /  Alb  3.4    /  TBili  0.7    /  DBili  x      /  AST  40     /  ALT  31     /  AlkPhos  105    18 Jul 2022 20:03                          8.0    12.82 )-----------( 206      ( 18 Jul 2022 20:03 )             24.0     PT/INR - ( 18 Jul 2022 20:03 )   PT: 12.4 sec;   INR: 1.08 ratio         PTT - ( 18 Jul 2022 20:03 )  PTT:27.1 sec  Lactate:    [ ] I have re-evaluated the patient's fluid status and reviewed vital signs. Clinical evaluation demonstrates an appropriate response to fluid resuscitation, with subsequent plan as follows:    The patient was not given 30ml/kg of fluid because it would be detrimental to the patient’s condition despite having hypotension, lactate = 4, and/or septic shock because:    [ ] Advanced stage heart failure (symptoms with minimal exertion or symptoms at rest)  [ ] Advanced stage kidney disease with GFR < 30 ml/min    [ ] obesity BMI > 30, patient received 30 cc/kg according to Ideal Body Weight     Patient received : 1L LR, vancx1, zosynx1    Plan (orders must be placed in EMR):     [ x ]  Check Repeat Lactate   [  ]  No change in current plan  [  ]  Start Vasopressors:  [ x ]  Repeat Fluid Bolus: 500cc  [  ] other:    Care Discussed with Consultants/Other Providers [ ] YES  [ ] NO

## 2022-07-18 NOTE — ED PROVIDER NOTE - CLINICAL SUMMARY MEDICAL DECISION MAKING FREE TEXT BOX
89yo F previously healthy from nursing home for recent fall now with fever/sob and sepsis vitals. Sepsis bundle given, suspect pneumonia, will give broad spectrum and fluids, reassess shortly. Febrile will defervesce 89yo F previously healthy from nursing home for recent fall now with fever/sob and sepsis vitals. Sepsis bundle given, suspect pneumonia, will give broad spectrum and fluids, reassess shortly. Febrile will defervesce    KORINA Salvador MD: Agree with resident/ACP MDM, assessment and plan as above.

## 2022-07-18 NOTE — ED PROVIDER NOTE - OBJECTIVE STATEMENT
89yo F pmh HTN presenting to [facility] ED with sepsis vitals and complaints of shortness of breath. 1 week ago pt had a mechanical fall 89yo F pmh HTN presenting to [facility] ED with sepsis vitals and complaints of shortness of breath. 1 week ago pt had a mechanical fall with shoulder dislocation and facial trauma, discharged after short hospitalization to [facility] - today found to be hypoxic to low 80s, hypotensive complaining of SOB. Pt also noted by son to be altered and confused, AAOx4 at baseline. Prior to fall would walk 1 mile a day for exercise. Pt reports

## 2022-07-18 NOTE — ED ADULT NURSE REASSESSMENT NOTE - NS ED NURSE REASSESS COMMENT FT1
Patient straight cathed using sterile technique. Dalia ZAMARRIPA at bedside to confirm sterility. Patient tolerated procedure well. Output of approximately 200 cc's of yellow urine. Sterile specimens collected and sent to lab. Pt tolerated well, successful after one attempt.

## 2022-07-18 NOTE — ED PROVIDER NOTE - PHYSICAL EXAMINATION
GENERAL: non-toxic appearing, alert, in NAD  HEENT: atraumatic, normocephalic, Vision grossly intact, no conjunctivitis or discharge, hearing grossly intact,  no nasal discharge, epistaxis   CARDIAC: RRR, normal S1S2,  no appreciable murmurs, no cyanosis, cap refill < 2 seconds  PULM: normal work of breathing, oxygen saturation 92% on RA, mild R basilar crackles and decreased breath sounds in R apex  GI: abdomen nondistended, soft, nontender, no guarding or rebound tenderness, no palpable masses  NEURO: awake and alert, follows commands, normal speech, PERRLA, EOMI, no focal motor or sensory deficits  MSK: spine appears normal, no joint swelling or erythema, ranging all extremities with no appreciable loss of ROM  EXT: no peripheral edema, calf tenderness, redness or swelling  SKIN: small wound to forehead with stitches in place, intact skin otherwise warm, dry, and intact, no rashes  PSYCH: appropriate mood and affect

## 2022-07-18 NOTE — ED PROVIDER NOTE - PROGRESS NOTE DETAILS
Paulo Torres, PGY-3: Pt reexamined at bedside, resting comfortably, vitals stable. Labs and imaging reviewed, suggestive of multilobar pneumonia and UTI. Broad spectrum given with 1L NS, labs are improved 100% on 2L, Systolic in 100s. Pt noted to have a separate chart under name Trixie Love. Plan to admit to hospital.

## 2022-07-18 NOTE — ED ADULT NURSE REASSESSMENT NOTE - NS ED NURSE REASSESS COMMENT FT1
Pt resting comfortable in bed, fluids not administered within first hour due heart murmur per MD Torres. No signs of acute distress noted.

## 2022-07-18 NOTE — ED ADULT NURSE NOTE - OBJECTIVE STATEMENT
89 yr old female BIBEMS complaining of SOB. Pt A&Ox3-4, currently denies any SOB/chest pain. Pt is a poor historian, states that she fell at a restaurant a few days ago, states that she tripped and hit head head, denies anticoag use. Healed stitches noted to right forehead. Ecchymosis noted to b/l orbitals and throughout body. Pt is alert, breathing spontaneously and unlabored, crackles auscultated to right lung base, abd is soft and nontender. Pt denies urinary Sx's/N/V/D, dizziness. 89 yr old female BIBEMS complaining of SOB. Pt A&Ox3-4 easily reoriented, currently denies any SOB/chest pain. Pt is a poor historian, states that she fell at a restaurant a few days ago, states that she tripped and hit head head, denies anticoag use. Healed stitches noted to right forehead. Ecchymosis noted to b/l orbitals and throughout body. Pt is alert, breathing spontaneously and unlabored, crackles auscultated to right lung base, abd is soft and nontender. Pt denies urinary Sx's/N/V/D, dizziness. Safety and comfort measures provided, bed locked and in lowest position, side rails up for safety. Call bell within reach. Cardiac monitor initiated and labs collected. Awaiting results. 89 yr old female BIBEMS complaining of SOB. Pt A&Ox3-4 easily reoriented, currently denies any SOB/chest pain. Pt is a poor historian, states that she fell at a restaurant a few days ago, states that she tripped and hit head head, denies anticoag use. Healed stitches noted to right forehead. Ecchymosis noted to b/l orbitals and throughout body, right arm in a sling s/p fall. Pt is alert, breathing spontaneously and unlabored, crackles auscultated to right lung base, abd is soft and nontender. Pt denies urinary Sx's/N/V/D, dizziness. Safety and comfort measures provided, bed locked and in lowest position, side rails up for safety. Call bell within reach. Cardiac monitor initiated and labs collected. Awaiting results.

## 2022-07-19 DIAGNOSIS — G93.41 METABOLIC ENCEPHALOPATHY: ICD-10-CM

## 2022-07-19 DIAGNOSIS — K59.00 CONSTIPATION, UNSPECIFIED: ICD-10-CM

## 2022-07-19 DIAGNOSIS — R09.89 OTHER SPECIFIED SYMPTOMS AND SIGNS INVOLVING THE CIRCULATORY AND RESPIRATORY SYSTEMS: ICD-10-CM

## 2022-07-19 DIAGNOSIS — A41.9 SEPSIS, UNSPECIFIED ORGANISM: ICD-10-CM

## 2022-07-19 DIAGNOSIS — N39.0 URINARY TRACT INFECTION, SITE NOT SPECIFIED: ICD-10-CM

## 2022-07-19 DIAGNOSIS — D64.9 ANEMIA, UNSPECIFIED: ICD-10-CM

## 2022-07-19 DIAGNOSIS — N17.9 ACUTE KIDNEY FAILURE, UNSPECIFIED: ICD-10-CM

## 2022-07-19 DIAGNOSIS — Z29.9 ENCOUNTER FOR PROPHYLACTIC MEASURES, UNSPECIFIED: ICD-10-CM

## 2022-07-19 DIAGNOSIS — R01.1 CARDIAC MURMUR, UNSPECIFIED: ICD-10-CM

## 2022-07-19 DIAGNOSIS — R33.9 RETENTION OF URINE, UNSPECIFIED: ICD-10-CM

## 2022-07-19 DIAGNOSIS — J18.9 PNEUMONIA, UNSPECIFIED ORGANISM: ICD-10-CM

## 2022-07-19 DIAGNOSIS — I10 ESSENTIAL (PRIMARY) HYPERTENSION: ICD-10-CM

## 2022-07-19 LAB
ALBUMIN SERPL ELPH-MCNC: 2.4 G/DL — LOW (ref 3.3–5)
ALP SERPL-CCNC: 91 U/L — SIGNIFICANT CHANGE UP (ref 40–120)
ALT FLD-CCNC: 22 U/L — SIGNIFICANT CHANGE UP (ref 10–45)
ANION GAP SERPL CALC-SCNC: 15 MMOL/L — SIGNIFICANT CHANGE UP (ref 5–17)
AST SERPL-CCNC: 30 U/L — SIGNIFICANT CHANGE UP (ref 10–40)
BILIRUB SERPL-MCNC: 0.4 MG/DL — SIGNIFICANT CHANGE UP (ref 0.2–1.2)
BLD GP AB SCN SERPL QL: NEGATIVE — SIGNIFICANT CHANGE UP
BUN SERPL-MCNC: 29 MG/DL — HIGH (ref 7–23)
CALCIUM SERPL-MCNC: 7.9 MG/DL — LOW (ref 8.4–10.5)
CHLORIDE SERPL-SCNC: 96 MMOL/L — SIGNIFICANT CHANGE UP (ref 96–108)
CO2 SERPL-SCNC: 16 MMOL/L — LOW (ref 22–31)
CREAT ?TM UR-MCNC: 76 MG/DL — SIGNIFICANT CHANGE UP
CREAT SERPL-MCNC: 1.41 MG/DL — HIGH (ref 0.5–1.3)
D DIMER BLD IA.RAPID-MCNC: 1032 NG/ML DDU — HIGH
E COLI DNA BLD POS QL NAA+NON-PROBE: SIGNIFICANT CHANGE UP
EGFR: 35 ML/MIN/1.73M2 — LOW
FERRITIN SERPL-MCNC: 612 NG/ML — HIGH (ref 15–150)
GLUCOSE SERPL-MCNC: 93 MG/DL — SIGNIFICANT CHANGE UP (ref 70–99)
GRAM STN FLD: SIGNIFICANT CHANGE UP
HCT VFR BLD CALC: 19.2 % — CRITICAL LOW (ref 34.5–45)
HCT VFR BLD CALC: 27.2 % — LOW (ref 34.5–45)
HGB BLD-MCNC: 6.5 G/DL — CRITICAL LOW (ref 11.5–15.5)
HGB BLD-MCNC: 9.4 G/DL — LOW (ref 11.5–15.5)
IRON SATN MFR SERPL: 10 UG/DL — LOW (ref 30–160)
IRON SATN MFR SERPL: 6 % — LOW (ref 14–50)
MAGNESIUM SERPL-MCNC: 1.7 MG/DL — SIGNIFICANT CHANGE UP (ref 1.6–2.6)
MCHC RBC-ENTMCNC: 30.8 PG — SIGNIFICANT CHANGE UP (ref 27–34)
MCHC RBC-ENTMCNC: 30.8 PG — SIGNIFICANT CHANGE UP (ref 27–34)
MCHC RBC-ENTMCNC: 33.9 GM/DL — SIGNIFICANT CHANGE UP (ref 32–36)
MCHC RBC-ENTMCNC: 34.6 GM/DL — SIGNIFICANT CHANGE UP (ref 32–36)
MCV RBC AUTO: 89.2 FL — SIGNIFICANT CHANGE UP (ref 80–100)
MCV RBC AUTO: 91 FL — SIGNIFICANT CHANGE UP (ref 80–100)
METHOD TYPE: SIGNIFICANT CHANGE UP
NRBC # BLD: 0 /100 WBCS — SIGNIFICANT CHANGE UP (ref 0–0)
NRBC # BLD: 0 /100 WBCS — SIGNIFICANT CHANGE UP (ref 0–0)
OSMOLALITY UR: 340 MOS/KG — SIGNIFICANT CHANGE UP (ref 300–900)
PHOSPHATE SERPL-MCNC: 2.1 MG/DL — LOW (ref 2.5–4.5)
PLATELET # BLD AUTO: 182 K/UL — SIGNIFICANT CHANGE UP (ref 150–400)
PLATELET # BLD AUTO: 208 K/UL — SIGNIFICANT CHANGE UP (ref 150–400)
POTASSIUM SERPL-MCNC: 4.4 MMOL/L — SIGNIFICANT CHANGE UP (ref 3.5–5.3)
POTASSIUM SERPL-SCNC: 4.4 MMOL/L — SIGNIFICANT CHANGE UP (ref 3.5–5.3)
POTASSIUM UR-SCNC: 33 MMOL/L — SIGNIFICANT CHANGE UP
PROCALCITONIN SERPL-MCNC: 7.24 NG/ML — HIGH (ref 0.02–0.1)
PROT ?TM UR-MCNC: 55 MG/DL — HIGH (ref 0–12)
PROT SERPL-MCNC: 5.4 G/DL — LOW (ref 6–8.3)
PROT/CREAT UR-RTO: 0.7 RATIO — HIGH (ref 0–0.2)
RBC # BLD: 2.11 M/UL — LOW (ref 3.8–5.2)
RBC # BLD: 2.11 M/UL — LOW (ref 3.8–5.2)
RBC # BLD: 3.04 M/UL — LOW (ref 3.8–5.2)
RBC # BLD: 3.04 M/UL — LOW (ref 3.8–5.2)
RBC # FLD: 14.6 % — HIGH (ref 10.3–14.5)
RBC # FLD: 14.6 % — HIGH (ref 10.3–14.5)
RETICS #: 40.1 K/UL — SIGNIFICANT CHANGE UP (ref 25–125)
RETICS #: 44.1 K/UL — SIGNIFICANT CHANGE UP (ref 25–125)
RETICS/RBC NFR: 1.5 % — SIGNIFICANT CHANGE UP (ref 0.5–2.5)
RETICS/RBC NFR: 1.9 % — SIGNIFICANT CHANGE UP (ref 0.5–2.5)
RH IG SCN BLD-IMP: POSITIVE — SIGNIFICANT CHANGE UP
SODIUM SERPL-SCNC: 127 MMOL/L — LOW (ref 135–145)
SODIUM UR-SCNC: 17 MMOL/L — SIGNIFICANT CHANGE UP
SPECIMEN SOURCE: SIGNIFICANT CHANGE UP
TIBC SERPL-MCNC: 161 UG/DL — LOW (ref 220–430)
UIBC SERPL-MCNC: 151 UG/DL — SIGNIFICANT CHANGE UP (ref 110–370)
UUN UR-MCNC: 578 MG/DL — SIGNIFICANT CHANGE UP
WBC # BLD: 14.32 K/UL — HIGH (ref 3.8–10.5)
WBC # BLD: 17.73 K/UL — HIGH (ref 3.8–10.5)
WBC # FLD AUTO: 14.32 K/UL — HIGH (ref 3.8–10.5)
WBC # FLD AUTO: 17.73 K/UL — HIGH (ref 3.8–10.5)

## 2022-07-19 PROCEDURE — 93970 EXTREMITY STUDY: CPT | Mod: 26

## 2022-07-19 PROCEDURE — 12345: CPT | Mod: NC,GC

## 2022-07-19 PROCEDURE — 71250 CT THORAX DX C-: CPT | Mod: 26

## 2022-07-19 PROCEDURE — 99223 1ST HOSP IP/OBS HIGH 75: CPT | Mod: GC

## 2022-07-19 PROCEDURE — 74176 CT ABD & PELVIS W/O CONTRAST: CPT | Mod: 26

## 2022-07-19 RX ORDER — IPRATROPIUM/ALBUTEROL SULFATE 18-103MCG
3 AEROSOL WITH ADAPTER (GRAM) INHALATION EVERY 6 HOURS
Refills: 0 | Status: DISCONTINUED | OUTPATIENT
Start: 2022-07-19 | End: 2022-07-28

## 2022-07-19 RX ORDER — POLYETHYLENE GLYCOL 3350 17 G/17G
17 POWDER, FOR SOLUTION ORAL DAILY
Refills: 0 | Status: DISCONTINUED | OUTPATIENT
Start: 2022-07-19 | End: 2022-07-28

## 2022-07-19 RX ORDER — PIPERACILLIN AND TAZOBACTAM 4; .5 G/20ML; G/20ML
3.38 INJECTION, POWDER, LYOPHILIZED, FOR SOLUTION INTRAVENOUS EVERY 12 HOURS
Refills: 0 | Status: DISCONTINUED | OUTPATIENT
Start: 2022-07-19 | End: 2022-07-21

## 2022-07-19 RX ORDER — SODIUM CHLORIDE 9 MG/ML
500 INJECTION, SOLUTION INTRAVENOUS
Refills: 0 | Status: COMPLETED | OUTPATIENT
Start: 2022-07-19 | End: 2022-07-19

## 2022-07-19 RX ORDER — LIDOCAINE 4 G/100G
1 CREAM TOPICAL EVERY 24 HOURS
Refills: 0 | Status: DISCONTINUED | OUTPATIENT
Start: 2022-07-19 | End: 2022-07-28

## 2022-07-19 RX ORDER — SODIUM,POTASSIUM PHOSPHATES 278-250MG
1 POWDER IN PACKET (EA) ORAL ONCE
Refills: 0 | Status: COMPLETED | OUTPATIENT
Start: 2022-07-19 | End: 2022-07-19

## 2022-07-19 RX ORDER — PIPERACILLIN AND TAZOBACTAM 4; .5 G/20ML; G/20ML
3.38 INJECTION, POWDER, LYOPHILIZED, FOR SOLUTION INTRAVENOUS ONCE
Refills: 0 | Status: DISCONTINUED | OUTPATIENT
Start: 2022-07-19 | End: 2022-07-19

## 2022-07-19 RX ORDER — CALCIUM CARBONATE 500(1250)
1 TABLET ORAL DAILY
Refills: 0 | Status: DISCONTINUED | OUTPATIENT
Start: 2022-07-19 | End: 2022-07-20

## 2022-07-19 RX ORDER — SODIUM CHLORIDE 9 MG/ML
1000 INJECTION, SOLUTION INTRAVENOUS ONCE
Refills: 0 | Status: DISCONTINUED | OUTPATIENT
Start: 2022-07-19 | End: 2022-07-19

## 2022-07-19 RX ADMIN — Medication 1 PACKET(S): at 13:18

## 2022-07-19 RX ADMIN — Medication 3 MILLILITER(S): at 23:17

## 2022-07-19 RX ADMIN — SODIUM CHLORIDE 100 MILLILITER(S): 9 INJECTION, SOLUTION INTRAVENOUS at 14:32

## 2022-07-19 RX ADMIN — LIDOCAINE 1 PATCH: 4 CREAM TOPICAL at 06:28

## 2022-07-19 RX ADMIN — Medication 3 MILLILITER(S): at 06:28

## 2022-07-19 RX ADMIN — Medication 3 MILLILITER(S): at 13:20

## 2022-07-19 RX ADMIN — PIPERACILLIN AND TAZOBACTAM 25 GRAM(S): 4; .5 INJECTION, POWDER, LYOPHILIZED, FOR SOLUTION INTRAVENOUS at 06:29

## 2022-07-19 RX ADMIN — PIPERACILLIN AND TAZOBACTAM 25 GRAM(S): 4; .5 INJECTION, POWDER, LYOPHILIZED, FOR SOLUTION INTRAVENOUS at 16:15

## 2022-07-19 RX ADMIN — LIDOCAINE 1 PATCH: 4 CREAM TOPICAL at 18:22

## 2022-07-19 RX ADMIN — SODIUM CHLORIDE 100 MILLILITER(S): 9 INJECTION, SOLUTION INTRAVENOUS at 03:09

## 2022-07-19 RX ADMIN — Medication 3 MILLILITER(S): at 16:15

## 2022-07-19 RX ADMIN — POLYETHYLENE GLYCOL 3350 17 GRAM(S): 17 POWDER, FOR SOLUTION ORAL at 14:32

## 2022-07-19 RX ADMIN — Medication 1 TABLET(S): at 13:17

## 2022-07-19 NOTE — H&P ADULT - ASSESSMENT
89F w/ hx of HTN, IBS, osteoporosis, anemia, recent admission fpr fall and R shoulder dislocation and Hill-Sachs lesion/ fracture and discharged to rehab presents for fever, lethargy, altered mental status and hypoxia admitted for severe septis 2/2 PNA and UTI c/b anemia and LIAM.     #Sepsis Severe      #PNA      #UTI    #LIAM      #Normocytic Anemia      #Urinary Retention      #Constipation       89F w/ hx of HTN, IBS, osteoporosis, anemia, recent admission fpr fall and R shoulder dislocation and Hill-Sachs lesion/ fracture and discharged to rehab presents for fever, lethargy, altered mental status and hypoxia admitted for severe septis 2/2 PNA and UTI c/b anemia and LIAM.     89F w/ hx of HTN, IBS, osteoporosis, anemia, recent admission fpr fall and R shoulder dislocation and Hill-Sachs lesion/ fracture and discharged to rehab presents for severe sepsis, hypoxic resp failure, acute metabolic encephalopathy 2/2 poss PNA and UTI in setting of anemia and LIAM.

## 2022-07-19 NOTE — H&P ADULT - PROBLEM SELECTOR PLAN 7
History of IBS  Patient with few days without BM, much less ambulation than her baseline  - miralax qd for now  - monitor for BMs Recent admission Hb ~10-11.  Hb 8 on admission, potentially after fluids given dilution  no signs of hemorrhage  - iron studies in AM  - retic in AM  - monitor Hb  - active T&S  - good IV access  - transfuse appropriately to Hb > 7

## 2022-07-19 NOTE — H&P ADULT - PROBLEM SELECTOR PLAN 8
CAD (coronary artery disease) Likely AS given age   - TTE since hasn't had in a long time reportedly Retaining 365cc with wet diaper, reassessed by nursing.  Patient used to be very ambulatory, but is now not as much, likely causing urinary retention.   - straight cath x1  - bladder scans per routine, consider vitale per nursing protocol if needed

## 2022-07-19 NOTE — PROGRESS NOTE ADULT - PROBLEM SELECTOR PLAN 4
Suprapubic pain, leukocytosis.   UA with LE and bacteria  - f/u UCx  - f/u BCx  - c/w zosyn renally adjusted

## 2022-07-19 NOTE — H&P ADULT - NSHPSOCIALHISTORY_GEN_ALL_CORE
Walks about 1 mile a day at baseline.    Tobacco:  Alcohol:  Drugs: Walks about 1 mile a day at baseline, prior to recent fall, and without assistance. Was in [facility] after shoulder fracture from fall.     Tobacco: Never  Alcohol: Denied  Drugs: Denied

## 2022-07-19 NOTE — PROGRESS NOTE ADULT - PROBLEM SELECTOR PLAN 1
Fever, leukocytosis (WBC of 12.8), hypotension, tachycardia, Lactate of 1.7  UTI vs. PNA  S/p 1.5L IV, vanc, zosyn in the ED  Improvement in BP with IV fluids  - follow up cultures

## 2022-07-19 NOTE — H&P ADULT - PROBLEM SELECTOR PLAN 5
Recent admission Hb ~10-11.  Hb 8 on admission, potentially after fluids given dilution  no signs of hemorrhage  - iron studies in AM  - retic in AM  - monitor Hb  - active T&S  - good IV access  - transfuse appropriately to Hb > 7 Baseline SCr ~1-1.2. Of note patient with another MRN in EMR.   SCr on admission 1.7  Likely due to sepsis or hypovolemia from sepsis  - trend SCr w IV hydration  - dose meds per gfr  - avoid nephrotoxins  - f/u urine lytes

## 2022-07-19 NOTE — PROGRESS NOTE ADULT - PROBLEM SELECTOR PLAN 5
Baseline SCr ~1-1.2. Of note patient with another MRN in EMR.   SCr on admission 1.7  Likely due to sepsis or hypovolemia from sepsis  - trend SCr w IV hydration  - dose meds per gfr  - avoid nephrotoxins  - f/u urine lytes

## 2022-07-19 NOTE — H&P ADULT - NSHPREVIEWOFSYSTEMS_GEN_ALL_CORE
CONSTITUTIONAL:  No weight loss, fever, chills, weakness or fatigue.  HEENT:  Eyes:  No visual loss, blurred vision, double vision or yellow sclerae. Ears, Nose, Throat:  No hearing loss, sneezing, congestion, runny nose or sore throat.  SKIN:  No rash or itching.  CARDIOVASCULAR:  No chest pain, chest pressure or chest discomfort. No palpitations.  RESPIRATORY:  No shortness of breath, cough or sputum.  GASTROINTESTINAL:  No anorexia, nausea, vomiting or diarrhea. No abdominal pain or blood.  GENITOURINARY:  Denies hematuria, dysuria.   NEUROLOGICAL:  No headache, dizziness, syncope, paralysis, ataxia, numbness or tingling in the extremities. No change in bowel or bladder control.  MUSCULOSKELETAL:  No muscle, back pain, joint pain or stiffness.  HEMATOLOGIC:  No anemia, bleeding or bruising.  LYMPHATICS:  No enlarged nodes.   PSYCHIATRIC:  No history of depression or anxiety.  ENDOCRINOLOGIC:  No reports of sweating, cold or heat intolerance. No polyuria or polydipsia.  ALLERGIES:  No history of asthma, hives, eczema or rhinitis. CONSTITUTIONAL:  No weight loss, + fever, No chills, + weakness fatigue.  HEENT:  Eyes:  No visual loss, blurred vision, double vision or yellow sclerae. Ears, Nose, Throat:  No hearing loss, sneezing, congestion, runny nose or sore throat.  SKIN:  No rash or itching.  CARDIOVASCULAR:  No chest pain, chest pressure or chest discomfort. No palpitations.  RESPIRATORY:  + shortness of breath and cough, no sputum.  GASTROINTESTINAL:  No anorexia, nausea, vomiting or diarrhea. No abdominal pain or blood.  GENITOURINARY:  Denies hematuria, dysuria. + suprapubic / lower abdominal pain  NEUROLOGICAL:  No headache, dizziness, syncope, paralysis, ataxia, numbness or tingling in the extremities. No change in bowel or bladder control.  MUSCULOSKELETAL:  No muscle, back pain, joint pain or stiffness.  HEMATOLOGIC:  No anemia, bleeding or bruising.  LYMPHATICS:  No enlarged nodes.   PSYCHIATRIC:  No history of depression or anxiety.  ENDOCRINOLOGIC:  No reports of sweating, cold or heat intolerance. No polyuria or polydipsia.  ALLERGIES:  No history of asthma, hives, eczema or rhinitis.

## 2022-07-19 NOTE — PROGRESS NOTE ADULT - PROBLEM SELECTOR PLAN 2
Patient reportedly O2 sat 80% prior to ER arrival  Now saturating 97% on 2L NC  Likely 2/2 PNA  Concern for possible PE, patient less ambulatory, recent fracture/dislocation of R shoulder. Wells score 1.5, 1.3% chance of PE in ED population.   - d-dimer AM labs  - PNA tx as below

## 2022-07-19 NOTE — H&P ADULT - PROBLEM SELECTOR PLAN 4
Baseline SCr ~1-1.2. Of note patient with another MRN in EMR.   SCr on admission 1.7  Likely due to sepsis or hypovolemia from sepsis  - trend SCr w IV hydration  - dose meds per gfr  - avoid nephrotoxins  - f/u urine lytes Suprapubic pain, leukocytosis.   UA with LE and bacteria  - f/u UCx  - f/u BCx  - c/w zosyn renally adjusted

## 2022-07-19 NOTE — H&P ADULT - PROBLEM SELECTOR PLAN 9
Hypotensive on admission  Home med olmesartan 40 qd  - Monitor BPs  - hold BP meds i/s/o sepsis History of IBS  Patient with few days without BM, much less ambulation than her baseline  - miralax qd for now  - monitor for BMs

## 2022-07-19 NOTE — H&P ADULT - ATTENDING COMMENTS
estrogens, conjugated, (PREMARIN) 0.3 MG tablet [[id number]]    Electronically signed by: True Pugh MD on 12/04/18 1321 Status: Discontinued   Ordering user: True Pugh MD 12/04/18 1321 Authorized by: True Pugh MD   Frequency: DAILY 12/04/18 - 365  days Discontinued by: True Pugh MD 09/18/19 0549 [Therapy completed]   Diagnoses         Pt was seen and examined during key portion of E/M service. Case discussed with resident. H&P reviewed and edited where appropriate. Other than the following, I agree with the above history, exam, assessment, and plan.  89F w/ hx of HTN, IBS, osteoporosis, anemia, recent admission fpr fall and R shoulder dislocation and Hill-Sachs lesion/ fracture and discharged to rehab presents for severe sepsis, hypoxic resp failure, acute metabolic encephalopathy 2/2 poss PNA and UTI in setting of anemia and LIAM.  - CT chest pending. cont empiric zosyn to treat poss PNA and UTI. anemia labs. IVF. PT eval

## 2022-07-19 NOTE — H&P ADULT - PROBLEM SELECTOR PLAN 2
Possible Aspiration  with hypoxia. Leukocytosis  CXR w PNA findings  - CT chest noncontrast to better evaluate lesions seen on CXR  - ? C/w zosyn cover for UTI and PNA   - F/u procalcitonin   - concern for possible PE, patient less ambulatory, recent fracture/dislocation of R shoulder. Wells score 1.5, 1.3% chance of PE in ED population.   - ? consider d-dimer AM labs Patient reportedly O2 sat 80% prior to ER arrival  Now saturating 97% on 2L NC  Likely 2/2 PNA  Concern for possible PE, patient less ambulatory, recent fracture/dislocation of R shoulder. Wells score 1.5, 1.3% chance of PE in ED population.   - d-dimer AM labs  - PNA tx as below

## 2022-07-19 NOTE — H&P ADULT - PROBLEM SELECTOR PLAN 10
DVT PPx: ?SCDs since anemia, consider transitioning to lovenox if anemia is stable and no bleeding   Diet: Vegetarian   Dispo: Sepsis management and tx, resolution of LIAM, Hypotensive on admission  Home med olmesartan 40 qd  - Monitor BPs  - hold BP meds i/s/o sepsis

## 2022-07-19 NOTE — H&P ADULT - NSHPPHYSICALEXAM_GEN_ALL_CORE
ICU Vital Signs Last 24 Hrs  T(C): 36.4 (18 Jul 2022 23:45), Max: 39.1 (18 Jul 2022 19:06)  T(F): 97.5 (18 Jul 2022 23:45), Max: 102.3 (18 Jul 2022 19:06)  HR: 70 (18 Jul 2022 23:45) (70 - 112)  BP: 112/76 (18 Jul 2022 23:45) (90/58 - 113/53)  BP(mean): 72 (18 Jul 2022 23:45) (63 - 72)  ABP: --  ABP(mean): --  RR: 18 (18 Jul 2022 23:45) (17 - 24)  SpO2: 100% (18 Jul 2022 23:45) (94% - 100%)    O2 Parameters below as of 18 Jul 2022 23:45  Patient On (Oxygen Delivery Method): nasal cannula  O2 Flow (L/min): 2      GENERAL APPEARANCE: Well developed, NAD  HEENT:  PERRL, EOMI. hearing grossly intact.  NECK: Neck supple, non-tender no lymphadenopathy, masses or thyromegaly.  CARDIAC: Normal S1 and S2. no mrg. RRR  LUNGS: Clear to auscultation B/L, no rales, rhonchi, or wheezing  ABDOMEN: Soft , NTND, bowel sounds normal. No guarding or rebound.   MUSCULOSKELETAL: ROM intact.  No joint erythema or tenderness.   EXTREMITIES: No edema. Peripheral pulses intact.   NEUROLOGICAL: Non focal. Strength and sensation symmetric and intact throughout.   SKIN: Warm and dry , Well perfused  PSYCHIATRIC: AOx3 , Normal mood and affect ICU Vital Signs Last 24 Hrs  T(C): 36.4 (18 Jul 2022 23:45), Max: 39.1 (18 Jul 2022 19:06)  T(F): 97.5 (18 Jul 2022 23:45), Max: 102.3 (18 Jul 2022 19:06)  HR: 70 (18 Jul 2022 23:45) (70 - 112)  BP: 112/76 (18 Jul 2022 23:45) (90/58 - 113/53)  BP(mean): 72 (18 Jul 2022 23:45) (63 - 72)  ABP: --  ABP(mean): --  RR: 18 (18 Jul 2022 23:45) (17 - 24)  SpO2: 100% (18 Jul 2022 23:45) (94% - 100%)    O2 Parameters below as of 18 Jul 2022 23:45  Patient On (Oxygen Delivery Method): nasal cannula  O2 Flow (L/min): 2    GENERAL APPEARANCE: Well developed, NAD. Facial bruising from recent fall.   HEENT:  PERRL, EOMI. hearing grossly intact.  NECK: Neck supple, non-tender no lymphadenopathy, masses or thyromegaly.  CARDIAC: Systolic ejection murmur appreciated in all regions. Otherwise RRR.   LUNGS: Scattered wheezes appreciated.   ABDOMEN: Slight suprapubic tenderness. Soft , NTND, bowel sounds normal. No guarding or rebound.   MUSCULOSKELETAL: ROM intact.  No joint erythema or tenderness.   EXTREMITIES: No edema. Peripheral pulses intact.   NEUROLOGICAL: Non focal. Strength and sensation symmetric and intact throughout.   SKIN: Warm and dry , Well perfused  PSYCHIATRIC: AOx3 , Normal mood and affect

## 2022-07-19 NOTE — H&P ADULT - NSICDXFAMILYHX_GEN_ALL_CORE_FT
FAMILY HISTORY:  Father  Still living? No  FH: diabetes mellitus, Age at diagnosis: Age Unknown  FH: heart disease, Age at diagnosis: Age Unknown    Mother  Still living? No  FH: gastric cancer, Age at diagnosis: Age Unknown

## 2022-07-19 NOTE — H&P ADULT - PROBLEM SELECTOR PLAN 3
Suprapubic pain, leukocytosis.   UA with LE and bacteria  - f/u UCx  - f/u BCx  - ? c/w zosyn for now Possible Aspiration  with hypoxia. Leukocytosis  CXR w PNA findings  RVP negative  - CT chest noncontrast to better evaluate lesions seen on CXR  - C/w zosyn renally adjusted cover for UTI and PNA   - F/u procalcitonin

## 2022-07-19 NOTE — PROGRESS NOTE ADULT - ASSESSMENT
89F w/ hx of HTN, IBS, osteoporosis, anemia, recent admission for R shoulder dislocation and Hill-Sachs lesion/ fracture due to fall, discharged to rehab presents for sepsis, hypoxic respiratory failure, acute metabolic encephalopathy secondary to Pneumonia and UTI which are in the setting of anemia and LIAM.

## 2022-07-19 NOTE — PATIENT PROFILE ADULT - FALL HARM RISK - HARM RISK INTERVENTIONS
Assistance with ambulation/Assistance OOB with selected safe patient handling equipment/Communicate Risk of Fall with Harm to all staff/Discuss with provider need for PT consult/Monitor gait and stability/Reinforce activity limits and safety measures with patient and family/Tailored Fall Risk Interventions/Visual Cue: Yellow wristband and red socks/Bed in lowest position, wheels locked, appropriate side rails in place/Call bell, personal items and telephone in reach/Instruct patient to call for assistance before getting out of bed or chair/Non-slip footwear when patient is out of bed/[facility] to call system/Physically safe environment - no spills, clutter or unnecessary equipment/Purposeful Proactive Rounding/Room/bathroom lighting operational, light cord in reach

## 2022-07-19 NOTE — PROGRESS NOTE ADULT - ATTENDING COMMENTS
above plans discussed with Dr. Mackay    # acute metabolic encephalopathy  # sepsis 2/2 UTI, GNR bacteremia  # acute hypoxic respiratory failure  # acute anemia  # LIAM    - pt with recent admission for shoulder fx s/p fall, discharged to rehab, brought back for ams and hypoxia  - on arrival, noted to have SpO2 in 80s on RA, improved to >95% on 2L NC  - s/p IVF, IV abx with improvement in mental status back to baseline  - noted to have diffuse ecchymosis on the face (pt states she fell again in the restaurant about a week ago)  - Hgb last admission was 10, now 6.5, concern for intraabdominal bleeding  - obtain CTAP to rule out RPB mauricio pt with diffuse pain in the pelvic region and frequent falls  - trend H/H and transfuse as needed for goal hgb >7  - LIAM likely in setting of volume loss as well as acute infection  - CT chest without signs of PNA  - D-dimer elevated; given recent hospitalization/immobilization, tachycardia, hypoxia, PE cannot be ruled out but CTA on hold 2/2 LIAM, also hypoxia corrected with 2L NC. Continue to monitor and if still concerning, will obtain VQ scan; in the meantime, obtain LE doppler to rule out DVT; Also, given acute anemia of unclear origin, will be cautious with starting systemic AC  - continue IV zosyn given recent hospitalization; BCx with GNR - fu sensitivity  - PT consult    Natalie Diaz MD  Division of Hospital Medicine  Contact via [employer] Teams  Office: [phone]

## 2022-07-19 NOTE — H&P ADULT - PROBLEM/PLAN-6
DISPLAY PLAN FREE TEXT H Plasty Text: Given the location of the defect, shape of the defect and the proximity to free margins a H-plasty was deemed most appropriate for repair.  Using a sterile surgical marker, the appropriate advancement arms of the H-plasty were drawn incorporating the defect and placing the expected incisions within the relaxed skin tension lines where possible. The area thus outlined was incised deep to adipose tissue with a #15 scalpel blade. The skin margins were undermined to an appropriate distance in all directions utilizing iris scissors.  The opposing advancement arms were then advanced into place in opposite direction and anchored with interrupted buried subcutaneous sutures.

## 2022-07-19 NOTE — PROGRESS NOTE ADULT - PROBLEM SELECTOR PLAN 8
Retaining 365cc with wet diaper, reassessed by nursing.  Patient used to be very ambulatory, but is now not as much, likely causing urinary retention.   - straight cath x1  - bladder scans per routine, consider vitale per nursing protocol if needed

## 2022-07-19 NOTE — H&P ADULT - PROBLEM SELECTOR PLAN 11
DVT PPx: ?SCDs since anemia, consider transitioning to heparin/lovenox if anemia is stable and no bleeding   Diet: Vegetarian   Dispo: Sepsis management and tx, resolution of LIAM

## 2022-07-19 NOTE — ED ADULT NURSE REASSESSMENT NOTE - NS ED NURSE REASSESS COMMENT FT1
Pt complaining of abd pain, on assessment abd tender to suprapubic area. Pt bladder scanned revealed 340 cc's of urine. Pt instructed to try and urinate, patient requesting hot packs to abd and states she will try to urinate.

## 2022-07-19 NOTE — PROGRESS NOTE ADULT - SUBJECTIVE AND OBJECTIVE BOX
**************************************  iNk Mackay, PGY-1  Senior Resident: Jm Moraes  After 7PM, please contact night float at #[phone] or [phone]  **************************************    INTERVAL HPI/OVERNIGHT EVENTS:  Patient was seen and examined at bedside. As per nurse and patient, no o/n events, patient resting comfortably. No complaints at this time. Patient denies: fever, chills, dizziness, weakness, HA, Changes in vision, CP, palpitations, SOB, cough, N/V/D/C, dysuria, changes in bowel movements, LE edema. ROS otherwise negative.    VITAL SIGNS:  T(F): 98.9 (22 @ 03:54)  HR: 93 (22 @ 03:54)  BP: 110/74 (22 @ 03:54)  RR: 18 (22 @ 03:54)  SpO2: 99% (22 @ 03:54)  Wt(kg): --    PHYSICAL EXAM:    Constitutional: WDWN, NAD  HEENT: PERRL, EOMI, sclera non-icteric, neck supple, trachea midline, no masses, no JVD, MMM, good dentition  Respiratory: CTA b/l, good air entry b/l, no wheezing, no rhonchi, no rales, without accessory muscle use and no intercostal retractions  Cardiovascular: RRR, normal S1S2, no M/R/G  Gastrointestinal: soft, NTND, no masses palpable, BS normal  Extremities: Warm, well perfused, pulses equal bilateral upper and lower extremities, no edema, no clubbing. Capillary refill <2 sec  Neurological: AAOx3, CN Grossly intact  Skin: Normal temperature, warm, dry    MEDICATIONS  (STANDING):  albuterol/ipratropium for Nebulization 3 milliLiter(s) Nebulizer every 6 hours  calcium carbonate    500 mG (Tums) Chewable 1 Tablet(s) Chew daily  lactated ringers. 500 milliLiter(s) (100 mL/Hr) IV Continuous <Continuous>  lidocaine   4% Patch 1 Patch Transdermal every 24 hours  piperacillin/tazobactam IVPB.. 3.375 Gram(s) IV Intermittent every 12 hours  polyethylene glycol 3350 17 Gram(s) Oral daily    MEDICATIONS  (PRN):      Allergies    No Known Allergies    Intolerances        LABS:                        8.0    12.82 )-----------( 206      ( 2022 20:03 )             24.0     07-18    125<L>  |  92<L>  |  39<H>  ----------------------------<  134<H>  4.5   |  17<L>  |  1.70<H>    Ca    8.3<L>      2022 20:03    TPro  6.8  /  Alb  3.4  /  TBili  0.7  /  DBili  x   /  AST  40  /  ALT  31  /  AlkPhos  105  07-18    PT/INR - ( 2022 20:03 )   PT: 12.4 sec;   INR: 1.08 ratio         PTT - ( 2022 20:03 )  PTT:27.1 sec  Urinalysis Basic - ( 2022 20:44 )    Color: Yellow / Appearance: Slightly Turbid / S.016 / pH: x  Gluc: x / Ketone: Negative  / Bili: Negative / Urobili: Negative   Blood: x / Protein: 30 mg/dL / Nitrite: Negative   Leuk Esterase: Large / RBC: 3 /hpf / WBC 76 /HPF   Sq Epi: x / Non Sq Epi: 1 /hpf / Bacteria: Moderate        RADIOLOGY & ADDITIONAL TESTS:  Reviewed

## 2022-07-19 NOTE — PROGRESS NOTE ADULT - PROBLEM SELECTOR PLAN 6
Confusion reported by son, baseline A&Ox4  Now resolved, patient A&Ox4 in ER.   Likely due to sepsis and/or hypoxia.   - hold off on CT head unless focal deficits or persistent confusion

## 2022-07-19 NOTE — H&P ADULT - HISTORY OF PRESENT ILLNESS
89F w/ hx of HTN, IBS, osteoporosis, anemia, recent admission fpr fall and R shoulder dislocation and Hill-Sachs lesion/ fracture and discharged to rehab presents for fever, lethargy, altered mental status and hypoxia.           ED Course: Tmax 102.3, , BP 90s/50s, RR 24 saturating 97% on 2L NC, reported 80% on RA prior to ER arrival.   Received 1g acetaminophen IV, Zosyn x1, Vanc 1g x1, 1.5L LR (sepsis protocol)  89F w/ hx of HTN, IBS, osteoporosis, anemia, recent admission fpr fall and R shoulder dislocation and Hill-Sachs lesion/ fracture and discharged to rehab presents for fever, lethargy, altered mental status and hypoxia. Patient reports non-productive cough, weakness and fatigue as well as shortness of breath. Denies chest pain. Has abdominal pain, mostly lower abdominal/suprapubic, but denies dysuria. No N/V/D. Has not had BM in few days reportedly, does feel constipated, no pain medications or opiate use. Reports fevers but no chills. No sputum production. She reports she used to walk a mile a day before her recent fall, has since not been very ambulatory. At baseline walked without assistance. Denies dizziness or lightheadedness.     ED Course: Tmax 102.3, , BP 90s/50s, RR 24 saturating 97% on 2L NC, reported 80% on RA prior to ER arrival.   Received 1g acetaminophen IV, Zosyn x1, Vanc 1g x1, 1.5L LR (sepsis protocol)  Patient with another MRN:     89F w/ hx of HTN, IBS, osteoporosis, anemia, recent admission fpr fall and R shoulder dislocation and Hill-Sachs lesion/ fracture and discharged to rehab presents for fever, lethargy, altered mental status and reported hypoxia for 2 days. Patient reports non-productive cough, weakness and fatigue as well as shortness of breath. Denies chest pain. Has abdominal pain, mostly lower abdominal/suprapubic, but denies dysuria. No N/V/D. Has not had BM in few days reportedly, does feel constipated, no pain medications or opiate use. Reports fevers but no chills. No sputum production. She reports she used to walk a mile a day before her recent fall, has since not been very ambulatory. At baseline walked without assistance. Denies dizziness or lightheadedness.     ED Course: Tmax 102.3, , BP 90s/50s, RR 24 saturating 97% on 2L NC, reported 80% on RA prior to ER arrival.   Received 1g acetaminophen IV, Zosyn x1, Vanc 1g x1, 1.5L LR (sepsis protocol)  Patient with another MRN: [id number]    89F w/ hx of HTN, IBS, osteoporosis, anemia, recent admission fpr fall and R shoulder dislocation and Hill-Sachs lesion/ fracture and discharged to rehab presents for fever, lethargy, altered mental status and reported hypoxia for 2 days. Patient reports non-productive cough, weakness and fatigue as well as shortness of breath. Denies chest pain. Has abdominal pain, mostly lower abdominal/suprapubic, but denies dysuria. No N/V/D. Has not had BM in few days reportedly, does feel constipated, no pain medications or opiate use. Reports fevers but no chills. No sputum production. She reports she used to walk a mile a day before her recent fall, has since not been very ambulatory. At baseline walked without assistance. Denies dizziness or lightheadedness.     ED Course: Tmax 102.3, , BP 90s/50s, RR 24 saturating 97% on 2L NC, reported 80% on RA prior to ER arrival.   Received 1g acetaminophen IV, Zosyn x1, Vanc 1g x1, 1.5L LR (sepsis protocol)  Patient with second MRN: [id number]    89F w/ hx of HTN, IBS, osteoporosis, anemia, recent admission fpr fall and R shoulder dislocation and Hill-Sachs lesion/ fracture and discharged to rehab presents for fever, lethargy, altered mental status and reported hypoxia for 2 days. Patient reports non-productive cough, weakness and fatigue as well as shortness of breath. Denies chest pain. Has abdominal pain, mostly lower abdominal/suprapubic, but denies dysuria. No N/V/D. Has not had BM in few days reportedly, does feel constipated, no pain medications or opiate use. Reports fevers but no chills. No sputum production. She reports she used to walk a mile a day before her recent fall, has since not been very ambulatory. At baseline walked without assistance. Denies dizziness or lightheadedness.     ED Course: Tmax 102.3, , BP 90s/50s, RR 24 saturating 97% on 2L NC, reported 80% on RA prior to ER arrival.   Received 1g acetaminophen IV, Zosyn x1, Vanc 1g x1, 1.5L LR (sepsis protocol)

## 2022-07-19 NOTE — H&P ADULT - NSHPLABSRESULTS_GEN_ALL_CORE
Labs:                          8.0    12.82 )-----------( 206      ( 2022 20:03 )             24.0     07-18    125<L>  |  92<L>  |  39<H>  ----------------------------<  134<H>  4.5   |  17<L>  |  1.70<H>    Ca    8.3<L>      2022 20:03    TPro  6.8  /  Alb  3.4  /  TBili  0.7  /  DBili  x   /  AST  40  /  ALT  31  /  AlkPhos  105  07-18    LIVER FUNCTIONS - ( 2022 20:03 )  Alb: 3.4 g/dL / Pro: 6.8 g/dL / ALK PHOS: 105 U/L / ALT: 31 U/L / AST: 40 U/L / GGT: x           PT/INR - ( 2022 20:03 )   PT: 12.4 sec;   INR: 1.08 ratio         PTT - ( 2022 20:03 )  PTT:27.1 sec  CAPILLARY BLOOD GLUCOSE    Blood Gas Profile - Venous (22 @ 19:34)    pH, Venous: 7.34: Due to specimen delivery delays, please interpret with caution    pCO2, Venous: 32 mmHg    pO2, Venous: 42 mmHg    HCO3, Venous: 17 mmol/L    Base Excess, Venous: -7.6 mmol/L    Oxygen Saturation, Venous: 72.0 %    Total CO2, Venous: 18 mmol/L    Blood Gas Source Venous: Venous    Lactate 2.4 ---> 1.7    Urinalysis Basic - ( 2022 20:44 )    Color: Yellow / Appearance: Slightly Turbid / S.016 / pH: x  Gluc: x / Ketone: Negative  / Bili: Negative / Urobili: Negative   Blood: x / Protein: 30 mg/dL / Nitrite: Negative   Leuk Esterase: Large / RBC: 3 /hpf / WBC 76 /HPF   Sq Epi: x / Non Sq Epi: 1 /hpf / Bacteria: Moderate        Micro:  RVP and COVID NEGATIVE    RADIOLOGY & ADDITIONAL TESTS:    EKG:    Xray:  < from: Xray Chest 1 View- PORTABLE-Urgent (22 @ 20:41) >    FINDINGS:  Multiple nodular opacities within the right lung and a single nodular   opacity adjacent to left hilum measuring up to 1.4 cm.  No pleural effusion or pneumothorax.  Cardiomediastinal silhouette size is within normal limits.  No acute osseous abnormality.    IMPRESSION:  Multiple nodular opacities within the right lung and a single nodular   opacity adjacent to left hilum measuring up to 1.4 cm.  Noncontrast chest CT can be performed for further evaluation if   clinically warranted.    < end of copied text > Personally reviewed imaging, labs    Labs:                          8.0    12.82 )-----------( 206      ( 2022 20:03 )             24.0     07-18    125<L>  |  92<L>  |  39<H>  ----------------------------<  134<H>  4.5   |  17<L>  |  1.70<H>    Ca    8.3<L>      2022 20:03    TPro  6.8  /  Alb  3.4  /  TBili  0.7  /  DBili  x   /  AST  40  /  ALT  31  /  AlkPhos  105  07-18    LIVER FUNCTIONS - ( 2022 20:03 )  Alb: 3.4 g/dL / Pro: 6.8 g/dL / ALK PHOS: 105 U/L / ALT: 31 U/L / AST: 40 U/L / GGT: x           PT/INR - ( 2022 20:03 )   PT: 12.4 sec;   INR: 1.08 ratio         PTT - ( 2022 20:03 )  PTT:27.1 sec  CAPILLARY BLOOD GLUCOSE    Blood Gas Profile - Venous (22 @ 19:34)    pH, Venous: 7.34: Due to specimen delivery delays, please interpret with caution    pCO2, Venous: 32 mmHg    pO2, Venous: 42 mmHg    HCO3, Venous: 17 mmol/L    Base Excess, Venous: -7.6 mmol/L    Oxygen Saturation, Venous: 72.0 %    Total CO2, Venous: 18 mmol/L    Blood Gas Source Venous: Venous    Lactate 2.4 ---> 1.7    Urinalysis Basic - ( 2022 20:44 )    Color: Yellow / Appearance: Slightly Turbid / S.016 / pH: x  Gluc: x / Ketone: Negative  / Bili: Negative / Urobili: Negative   Blood: x / Protein: 30 mg/dL / Nitrite: Negative   Leuk Esterase: Large / RBC: 3 /hpf / WBC 76 /HPF   Sq Epi: x / Non Sq Epi: 1 /hpf / Bacteria: Moderate        Micro:  RVP and COVID NEGATIVE    RADIOLOGY & ADDITIONAL TESTS:    EKG:    Xray:  < from: Xray Chest 1 View- PORTABLE-Urgent (22 @ 20:41) >    FINDINGS:  Multiple nodular opacities within the right lung and a single nodular   opacity adjacent to left hilum measuring up to 1.4 cm.  No pleural effusion or pneumothorax.  Cardiomediastinal silhouette size is within normal limits.  No acute osseous abnormality.    IMPRESSION:  Multiple nodular opacities within the right lung and a single nodular   opacity adjacent to left hilum measuring up to 1.4 cm.  Noncontrast chest CT can be performed for further evaluation if   clinically warranted.    < end of copied text >

## 2022-07-19 NOTE — H&P ADULT - PROBLEM SELECTOR PLAN 6
Retaining 365 with wet diaper, reassessed by nursing.  Patient used to be very ambulatory, but is now not as much, likely causing urinary retention.   - straight cath x1  - bladder scans per routine, consider vitale per nursing protocol if needed Confusion reported by son, baseline A&Ox4  Now resolved, patient A&Ox4 in ER.   Likely due to sepsis and/or hypoxia.   - hold off on CT head unless focal deficits or persistent confusion

## 2022-07-20 DIAGNOSIS — A41.9 SEPSIS, UNSPECIFIED ORGANISM: ICD-10-CM

## 2022-07-20 LAB
ALBUMIN SERPL ELPH-MCNC: 3 G/DL — LOW (ref 3.3–5)
ALP SERPL-CCNC: 131 U/L — HIGH (ref 40–120)
ALT FLD-CCNC: 25 U/L — SIGNIFICANT CHANGE UP (ref 10–45)
ANION GAP SERPL CALC-SCNC: 12 MMOL/L — SIGNIFICANT CHANGE UP (ref 5–17)
APTT BLD: 26 SEC — LOW (ref 27.5–35.5)
AST SERPL-CCNC: 27 U/L — SIGNIFICANT CHANGE UP (ref 10–40)
BASE EXCESS BLDV CALC-SCNC: -4.4 MMOL/L — LOW (ref -2–2)
BASOPHILS # BLD AUTO: 0.04 K/UL — SIGNIFICANT CHANGE UP (ref 0–0.2)
BASOPHILS NFR BLD AUTO: 0.2 % — SIGNIFICANT CHANGE UP (ref 0–2)
BILIRUB SERPL-MCNC: 0.5 MG/DL — SIGNIFICANT CHANGE UP (ref 0.2–1.2)
BUN SERPL-MCNC: 31 MG/DL — HIGH (ref 7–23)
CA-I SERPL-SCNC: 1.14 MMOL/L — LOW (ref 1.15–1.33)
CALCIUM SERPL-MCNC: 8.4 MG/DL — SIGNIFICANT CHANGE UP (ref 8.4–10.5)
CHLORIDE BLDV-SCNC: 99 MMOL/L — SIGNIFICANT CHANGE UP (ref 96–108)
CHLORIDE SERPL-SCNC: 99 MMOL/L — SIGNIFICANT CHANGE UP (ref 96–108)
CO2 BLDV-SCNC: 23 MMOL/L — SIGNIFICANT CHANGE UP (ref 22–26)
CO2 SERPL-SCNC: 20 MMOL/L — LOW (ref 22–31)
CREAT SERPL-MCNC: 1.44 MG/DL — HIGH (ref 0.5–1.3)
D DIMER BLD IA.RAPID-MCNC: 1729 NG/ML DDU — HIGH
EGFR: 34 ML/MIN/1.73M2 — LOW
EOSINOPHIL # BLD AUTO: 0.08 K/UL — SIGNIFICANT CHANGE UP (ref 0–0.5)
EOSINOPHIL NFR BLD AUTO: 0.5 % — SIGNIFICANT CHANGE UP (ref 0–6)
FERRITIN SERPL-MCNC: 812 NG/ML — HIGH (ref 15–150)
FIBRINOGEN PPP-MCNC: 1314 MG/DL — HIGH (ref 330–520)
FOLATE SERPL-MCNC: >20 NG/ML — SIGNIFICANT CHANGE UP
GAS PNL BLDV: 130 MMOL/L — LOW (ref 136–145)
GAS PNL BLDV: SIGNIFICANT CHANGE UP
GAS PNL BLDV: SIGNIFICANT CHANGE UP
GLUCOSE BLDV-MCNC: 150 MG/DL — HIGH (ref 70–99)
GLUCOSE SERPL-MCNC: 122 MG/DL — HIGH (ref 70–99)
HAPTOGLOB SERPL-MCNC: 443 MG/DL — HIGH (ref 34–200)
HCO3 BLDV-SCNC: 22 MMOL/L — SIGNIFICANT CHANGE UP (ref 22–29)
HCT VFR BLD CALC: 28.2 % — LOW (ref 34.5–45)
HCT VFR BLDA CALC: 30 % — LOW (ref 34.5–46.5)
HGB BLD CALC-MCNC: 10.1 G/DL — LOW (ref 11.7–16.1)
HGB BLD-MCNC: 9.6 G/DL — LOW (ref 11.5–15.5)
IMM GRANULOCYTES NFR BLD AUTO: 2.3 % — HIGH (ref 0–1.5)
INR BLD: 1 RATIO — SIGNIFICANT CHANGE UP (ref 0.88–1.16)
IRON SATN MFR SERPL: 12 UG/DL — LOW (ref 30–160)
IRON SATN MFR SERPL: 7 % — LOW (ref 14–50)
LACTATE BLDV-MCNC: 2.6 MMOL/L — HIGH (ref 0.7–2)
LDH SERPL L TO P-CCNC: 258 U/L — HIGH (ref 50–242)
LYMPHOCYTES # BLD AUTO: 1.35 K/UL — SIGNIFICANT CHANGE UP (ref 1–3.3)
LYMPHOCYTES # BLD AUTO: 7.9 % — LOW (ref 13–44)
MAGNESIUM SERPL-MCNC: 2.2 MG/DL — SIGNIFICANT CHANGE UP (ref 1.6–2.6)
MCHC RBC-ENTMCNC: 31.2 PG — SIGNIFICANT CHANGE UP (ref 27–34)
MCHC RBC-ENTMCNC: 34 GM/DL — SIGNIFICANT CHANGE UP (ref 32–36)
MCV RBC AUTO: 91.6 FL — SIGNIFICANT CHANGE UP (ref 80–100)
MONOCYTES # BLD AUTO: 1.1 K/UL — HIGH (ref 0–0.9)
MONOCYTES NFR BLD AUTO: 6.4 % — SIGNIFICANT CHANGE UP (ref 2–14)
NEUTROPHILS # BLD AUTO: 14.11 K/UL — HIGH (ref 1.8–7.4)
NEUTROPHILS NFR BLD AUTO: 82.7 % — HIGH (ref 43–77)
NRBC # BLD: 0 /100 WBCS — SIGNIFICANT CHANGE UP (ref 0–0)
PCO2 BLDV: 43 MMHG — HIGH (ref 39–42)
PH BLDV: 7.31 — LOW (ref 7.32–7.43)
PHOSPHATE SERPL-MCNC: 2.7 MG/DL — SIGNIFICANT CHANGE UP (ref 2.5–4.5)
PLATELET # BLD AUTO: 216 K/UL — SIGNIFICANT CHANGE UP (ref 150–400)
PO2 BLDV: 35 MMHG — SIGNIFICANT CHANGE UP (ref 25–45)
POTASSIUM BLDV-SCNC: 4.5 MMOL/L — SIGNIFICANT CHANGE UP (ref 3.5–5.1)
POTASSIUM SERPL-MCNC: 4.7 MMOL/L — SIGNIFICANT CHANGE UP (ref 3.5–5.3)
POTASSIUM SERPL-SCNC: 4.7 MMOL/L — SIGNIFICANT CHANGE UP (ref 3.5–5.3)
PROT SERPL-MCNC: 6.5 G/DL — SIGNIFICANT CHANGE UP (ref 6–8.3)
PROTHROM AB SERPL-ACNC: 11.5 SEC — SIGNIFICANT CHANGE UP (ref 10.5–13.4)
RBC # BLD: 3.08 M/UL — LOW (ref 3.8–5.2)
RBC # BLD: 3.08 M/UL — LOW (ref 3.8–5.2)
RBC # FLD: 14.9 % — HIGH (ref 10.3–14.5)
RETICS #: 37 K/UL — SIGNIFICANT CHANGE UP (ref 25–125)
RETICS/RBC NFR: 1.2 % — SIGNIFICANT CHANGE UP (ref 0.5–2.5)
SAO2 % BLDV: 61.5 % — LOW (ref 67–88)
SODIUM SERPL-SCNC: 131 MMOL/L — LOW (ref 135–145)
TIBC SERPL-MCNC: 177 UG/DL — LOW (ref 220–430)
UIBC SERPL-MCNC: 165 UG/DL — SIGNIFICANT CHANGE UP (ref 110–370)
VIT B12 SERPL-MCNC: >2000 PG/ML — HIGH (ref 232–1245)
WBC # BLD: 17.08 K/UL — HIGH (ref 3.8–10.5)
WBC # FLD AUTO: 17.08 K/UL — HIGH (ref 3.8–10.5)

## 2022-07-20 PROCEDURE — 99233 SBSQ HOSP IP/OBS HIGH 50: CPT | Mod: GC

## 2022-07-20 PROCEDURE — 71045 X-RAY EXAM CHEST 1 VIEW: CPT | Mod: 26

## 2022-07-20 PROCEDURE — 78582 LUNG VENTILAT&PERFUS IMAGING: CPT | Mod: 26

## 2022-07-20 RX ORDER — ACETAMINOPHEN 500 MG
650 TABLET ORAL ONCE
Refills: 0 | Status: COMPLETED | OUTPATIENT
Start: 2022-07-20 | End: 2022-07-21

## 2022-07-20 RX ORDER — CALCIUM CARBONATE 500(1250)
2 TABLET ORAL AT BEDTIME
Refills: 0 | Status: DISCONTINUED | OUTPATIENT
Start: 2022-07-20 | End: 2022-07-28

## 2022-07-20 RX ORDER — ACETAMINOPHEN 500 MG
650 TABLET ORAL ONCE
Refills: 0 | Status: COMPLETED | OUTPATIENT
Start: 2022-07-20 | End: 2022-07-20

## 2022-07-20 RX ORDER — SODIUM CHLORIDE 9 MG/ML
1000 INJECTION, SOLUTION INTRAVENOUS ONCE
Refills: 0 | Status: COMPLETED | OUTPATIENT
Start: 2022-07-20 | End: 2022-07-20

## 2022-07-20 RX ADMIN — SODIUM CHLORIDE 1000 MILLILITER(S): 9 INJECTION, SOLUTION INTRAVENOUS at 18:18

## 2022-07-20 RX ADMIN — Medication 3 MILLILITER(S): at 18:19

## 2022-07-20 RX ADMIN — Medication 3 MILLILITER(S): at 05:36

## 2022-07-20 RX ADMIN — PIPERACILLIN AND TAZOBACTAM 25 GRAM(S): 4; .5 INJECTION, POWDER, LYOPHILIZED, FOR SOLUTION INTRAVENOUS at 18:17

## 2022-07-20 RX ADMIN — PIPERACILLIN AND TAZOBACTAM 25 GRAM(S): 4; .5 INJECTION, POWDER, LYOPHILIZED, FOR SOLUTION INTRAVENOUS at 05:36

## 2022-07-20 RX ADMIN — Medication 650 MILLIGRAM(S): at 05:36

## 2022-07-20 RX ADMIN — LIDOCAINE 1 PATCH: 4 CREAM TOPICAL at 05:35

## 2022-07-20 RX ADMIN — LIDOCAINE 1 PATCH: 4 CREAM TOPICAL at 19:55

## 2022-07-20 RX ADMIN — Medication 200 MILLIGRAM(S): at 02:12

## 2022-07-20 RX ADMIN — Medication 200 MILLIGRAM(S): at 19:51

## 2022-07-20 NOTE — PROGRESS NOTE ADULT - PROBLEM SELECTOR PLAN 1
Fever, leukocytosis (WBC of 12.8), hypotension, tachycardia, Lactate of 1.7 upon presentation  - Blood culture and urine cultures positive for E. coli  - on zosyn w/ renal adjustments.   - trend cbc, monitor vital signs for infection, monitor temperature curve  - elevated d-dimer (>1000)

## 2022-07-20 NOTE — PROGRESS NOTE ADULT - PROBLEM SELECTOR PLAN 5
Patient reportedly O2 sat 80% prior to ER arrival  Now saturating 97% on 2L NC  CXR:  Multiple nodular opacities within the right lung and a single nodular   opacity adjacent to left hilum measuring up to 1.4 cm.  - CT chest demonstrates small pleural effusions b/l, no evidence of pneumonia.  RVP negative.

## 2022-07-20 NOTE — PROGRESS NOTE ADULT - PROBLEM SELECTOR PLAN 4
Baseline SCr ~1-1.2. Of note patient with another MRN in EMR.   SCr on admission 1.7  Likely due to sepsis or hypovolemia from sepsis  - trend SCr   - dose meds per gfr  - avoid nephrotoxins  - urine lytes indicate extra-renal sodium wasting, will continue to monitor hydration state and trend BMP to monitor serum chemistry's.

## 2022-07-20 NOTE — PROGRESS NOTE ADULT - PROBLEM SELECTOR PLAN 2
Recent admission Hb ~10-11.  Hb of 9 post-transfusion on admission, potentially after fluids given dilution  - elevated d-dimer (>1000), f/u on hemolytic workup labs.   no signs of hemorrhage on CT abdomen pelvis 7/19 - no evidence of retroperitoneal hematoma.   - monitor Hb  - active T&S  - good IV access  - transfuse appropriately to Hb > 7

## 2022-07-20 NOTE — PROGRESS NOTE ADULT - ASSESSMENT
89F w/ hx of HTN, IBS, osteoporosis, anemia, recent admission for R shoulder dislocation and Hill-Sachs lesion/ fracture due to fall and discharged to rehab facility, presents from rehab facility for sepsis, hypoxic respiratory failure, acute metabolic encephalopathy secondary toUTI which are in the setting of anemia and LIAM.

## 2022-07-20 NOTE — PROGRESS NOTE ADULT - PROBLEM SELECTOR PLAN 8
History of IBS  Patient with few days without BM, much less ambulation than her baseline  - miralax qd for now  - monitor for BMs

## 2022-07-20 NOTE — PROGRESS NOTE ADULT - SUBJECTIVE AND OBJECTIVE BOX
**************************************  Nik Mackay, PGY-1  Senior Resident: Jm Moraes  After 7PM, please contact night float at [phone] or [phone]  **************************************    INTERVAL HPI/OVERNIGHT EVENTS:  Pt was febrile to 100.2 overnight and had repeat cultures. Patient was seen and examined at bedside. As per nurse and patient, no o/n events, patient resting comfortably. No complaints at this time. Patient denies: fever, chills, dizziness, weakness, HA, Changes in vision, CP, palpitations, SOB, cough, N/V/D/C, dysuria, changes in bowel movements, LE edema. ROS otherwise negative.    VITAL SIGNS:  T(F): 100.2 (22 @ 05:27)  HR: 92 (22 @ 05:01)  BP: 139/79 (22 @ 05:01)  RR: 18 (22 @ 05:01)  SpO2: 98% (22 @ 05:01)  Wt(kg): --    PHYSICAL EXAM:    Constitutional: WDWN, NAD  HEENT: PERRL, EOMI, sclera non-icteric, neck supple, trachea midline, no masses, no JVD, MMM, good dentition  Respiratory: CTA b/l, good air entry b/l, no wheezing, no rhonchi, no rales, without accessory muscle use and no intercostal retractions  Cardiovascular: RRR, normal S1S2, no M/R/G  Gastrointestinal: soft, NTND, no masses palpable, BS normal  Extremities: Warm, well perfused, pulses equal bilateral upper and lower extremities, no edema, no clubbing. Capillary refill <2 sec  Neurological: AAOx3, CN Grossly intact  Skin: Normal temperature, warm, dry    MEDICATIONS  (STANDING):  albuterol/ipratropium for Nebulization 3 milliLiter(s) Nebulizer every 6 hours  calcium carbonate    500 mG (Tums) Chewable 1 Tablet(s) Chew daily  lidocaine   4% Patch 1 Patch Transdermal every 24 hours  piperacillin/tazobactam IVPB.. 3.375 Gram(s) IV Intermittent every 12 hours  polyethylene glycol 3350 17 Gram(s) Oral daily    MEDICATIONS  (PRN):  guaiFENesin Oral Liquid (Sugar-Free) 200 milliGRAM(s) Oral every 6 hours PRN Cough      Allergies    No Known Allergies    Intolerances        LABS:                        9.4    17.73 )-----------( 208      ( 2022 17:58 )             27.2     07-19    127<L>  |  96  |  29<H>  ----------------------------<  93  4.4   |  16<L>  |  1.41<H>    Ca    7.9<L>      2022 07:29  Phos  2.1     07-19  Mg     1.7     07-19    TPro  5.4<L>  /  Alb  2.4<L>  /  TBili  0.4  /  DBili  x   /  AST  30  /  ALT  22  /  AlkPhos  91  07-19    PT/INR - ( 2022 20:03 )   PT: 12.4 sec;   INR: 1.08 ratio         PTT - ( 2022 20:03 )  PTT:27.1 sec  Urinalysis Basic - ( 2022 20:44 )    Color: Yellow / Appearance: Slightly Turbid / S.016 / pH: x  Gluc: x / Ketone: Negative  / Bili: Negative / Urobili: Negative   Blood: x / Protein: 30 mg/dL / Nitrite: Negative   Leuk Esterase: Large / RBC: 3 /hpf / WBC 76 /HPF   Sq Epi: x / Non Sq Epi: 1 /hpf / Bacteria: Moderate        RADIOLOGY & ADDITIONAL TESTS:  Reviewed

## 2022-07-20 NOTE — PROGRESS NOTE ADULT - ATTENDING COMMENTS
above plans discussed with Dr. Mackay    # acute metabolic encephalopathy  # sepsis 2/2 UTI, GNR bacteremia  # acute hypoxic respiratory failure  # acute anemia  # LIAM    - pt with recent admission for shoulder fx s/p fall, discharged to rehab, brought back for ams and hypoxia  - on arrival, noted to have SpO2 in 80s on RA, improved to >95% on 2L NC;  s/p IVF, IV abx with improvement in mental status back to baseline  - pt clinically much improved  - leukocytosis continues to trend up, D-dimer also trending up with persistent hypoxia even though no signs of PNA on CT chest; LE doppler negative; will obtain VQ scan to rule out PE (CTA avoided 2/2 LIAM)  - s/p 1 unit of pRBC transfusion with appropriate response; trend H/H and transfuse as needed for goal hgb >7; CTAP negative for RPB  - LIAM likely in setting of volume loss as well as acute infection  - CT chest without signs of PNA  - continue IV zosyn for broad coverage given recent hospitalization; BCx with GNR - fu sensitivity  - PT consult    Natalie Diaz MD  Division of Hospital Medicine  Contact via Microsoft Teams  Office: [phone] above plans discussed with Dr. Mackay    # acute metabolic encephalopathy  # sepsis 2/2 UTI, GNR bacteremia  # acute hypoxic respiratory failure  # acute anemia  # LIAM    - pt with recent admission for shoulder fx s/p fall, discharged to rehab, brought back for ams and hypoxia  - on arrival, noted to have SpO2 in 80s on RA, improved to >95% on 2L NC;  s/p IVF, IV abx with improvement in mental status back to baseline  - pt clinically much improved  - leukocytosis continues to trend up, D-dimer also trending up with persistent hypoxia even though no signs of PNA on CT chest; LE doppler negative; will obtain VQ scan to rule out PE (CTA avoided 2/2 LIAM)  - s/p 1 unit of pRBC transfusion with appropriate response; trend H/H and transfuse as needed for goal hgb >7; CTAP negative for RPB  - LIAM likely in setting of volume loss as well as acute infection  - CT chest without signs of PNA  - continue IV zosyn for broad coverage given recent hospitalization; BCx with E.Coli - fu sensitivity  - PT consult    Natalie Diaz MD  Division of Hospital Medicine  Contact via Microsoft Teams  Office: [phone]

## 2022-07-21 ENCOUNTER — APPOINTMENT (OUTPATIENT)
Dept: ORTHOPEDIC SURGERY | Facility: CLINIC | Age: 87
End: 2022-07-21
Payer: MEDICARE

## 2022-07-21 DIAGNOSIS — I48.91 UNSPECIFIED ATRIAL FIBRILLATION: ICD-10-CM

## 2022-07-21 LAB
-  AMIKACIN: SIGNIFICANT CHANGE UP
-  AMIKACIN: SIGNIFICANT CHANGE UP
-  AMOXICILLIN/CLAVULANIC ACID: SIGNIFICANT CHANGE UP
-  AMPICILLIN/SULBACTAM: SIGNIFICANT CHANGE UP
-  AMPICILLIN/SULBACTAM: SIGNIFICANT CHANGE UP
-  AMPICILLIN: SIGNIFICANT CHANGE UP
-  AMPICILLIN: SIGNIFICANT CHANGE UP
-  AZTREONAM: SIGNIFICANT CHANGE UP
-  AZTREONAM: SIGNIFICANT CHANGE UP
-  CEFAZOLIN: SIGNIFICANT CHANGE UP
-  CEFAZOLIN: SIGNIFICANT CHANGE UP
-  CEFEPIME: SIGNIFICANT CHANGE UP
-  CEFEPIME: SIGNIFICANT CHANGE UP
-  CEFOXITIN: SIGNIFICANT CHANGE UP
-  CEFOXITIN: SIGNIFICANT CHANGE UP
-  CEFTRIAXONE: SIGNIFICANT CHANGE UP
-  CEFTRIAXONE: SIGNIFICANT CHANGE UP
-  CIPROFLOXACIN: SIGNIFICANT CHANGE UP
-  CIPROFLOXACIN: SIGNIFICANT CHANGE UP
-  ERTAPENEM: SIGNIFICANT CHANGE UP
-  ERTAPENEM: SIGNIFICANT CHANGE UP
-  GENTAMICIN: SIGNIFICANT CHANGE UP
-  GENTAMICIN: SIGNIFICANT CHANGE UP
-  IMIPENEM: SIGNIFICANT CHANGE UP
-  IMIPENEM: SIGNIFICANT CHANGE UP
-  LEVOFLOXACIN: SIGNIFICANT CHANGE UP
-  LEVOFLOXACIN: SIGNIFICANT CHANGE UP
-  MEROPENEM: SIGNIFICANT CHANGE UP
-  MEROPENEM: SIGNIFICANT CHANGE UP
-  NITROFURANTOIN: SIGNIFICANT CHANGE UP
-  PIPERACILLIN/TAZOBACTAM: SIGNIFICANT CHANGE UP
-  PIPERACILLIN/TAZOBACTAM: SIGNIFICANT CHANGE UP
-  TIGECYCLINE: SIGNIFICANT CHANGE UP
-  TOBRAMYCIN: SIGNIFICANT CHANGE UP
-  TOBRAMYCIN: SIGNIFICANT CHANGE UP
-  TRIMETHOPRIM/SULFAMETHOXAZOLE: SIGNIFICANT CHANGE UP
-  TRIMETHOPRIM/SULFAMETHOXAZOLE: SIGNIFICANT CHANGE UP
ALBUMIN SERPL ELPH-MCNC: 3 G/DL — LOW (ref 3.3–5)
ALP SERPL-CCNC: 127 U/L — HIGH (ref 40–120)
ALT FLD-CCNC: 64 U/L — HIGH (ref 10–45)
ANION GAP SERPL CALC-SCNC: 13 MMOL/L — SIGNIFICANT CHANGE UP (ref 5–17)
APTT BLD: 24.1 SEC — LOW (ref 27.5–35.5)
AST SERPL-CCNC: 81 U/L — HIGH (ref 10–40)
BASOPHILS # BLD AUTO: 0.05 K/UL — SIGNIFICANT CHANGE UP (ref 0–0.2)
BASOPHILS NFR BLD AUTO: 0.4 % — SIGNIFICANT CHANGE UP (ref 0–2)
BILIRUB SERPL-MCNC: 0.5 MG/DL — SIGNIFICANT CHANGE UP (ref 0.2–1.2)
BUN SERPL-MCNC: 28 MG/DL — HIGH (ref 7–23)
CALCIUM SERPL-MCNC: 8.7 MG/DL — SIGNIFICANT CHANGE UP (ref 8.4–10.5)
CHLORIDE SERPL-SCNC: 98 MMOL/L — SIGNIFICANT CHANGE UP (ref 96–108)
CO2 SERPL-SCNC: 19 MMOL/L — LOW (ref 22–31)
CREAT SERPL-MCNC: 1.49 MG/DL — HIGH (ref 0.5–1.3)
CULTURE RESULTS: SIGNIFICANT CHANGE UP
D DIMER BLD IA.RAPID-MCNC: 1105 NG/ML DDU — HIGH
EGFR: 33 ML/MIN/1.73M2 — LOW
EOSINOPHIL # BLD AUTO: 0.14 K/UL — SIGNIFICANT CHANGE UP (ref 0–0.5)
EOSINOPHIL NFR BLD AUTO: 1 % — SIGNIFICANT CHANGE UP (ref 0–6)
GLUCOSE SERPL-MCNC: 123 MG/DL — HIGH (ref 70–99)
HCT VFR BLD CALC: 27.6 % — LOW (ref 34.5–45)
HGB BLD-MCNC: 9.2 G/DL — LOW (ref 11.5–15.5)
IMM GRANULOCYTES NFR BLD AUTO: 3.3 % — HIGH (ref 0–1.5)
INR BLD: 1.08 RATIO — SIGNIFICANT CHANGE UP (ref 0.88–1.16)
LYMPHOCYTES # BLD AUTO: 1.17 K/UL — SIGNIFICANT CHANGE UP (ref 1–3.3)
LYMPHOCYTES # BLD AUTO: 8.7 % — LOW (ref 13–44)
MAGNESIUM SERPL-MCNC: 2.3 MG/DL — SIGNIFICANT CHANGE UP (ref 1.6–2.6)
MCHC RBC-ENTMCNC: 30.3 PG — SIGNIFICANT CHANGE UP (ref 27–34)
MCHC RBC-ENTMCNC: 33.3 GM/DL — SIGNIFICANT CHANGE UP (ref 32–36)
MCV RBC AUTO: 90.8 FL — SIGNIFICANT CHANGE UP (ref 80–100)
METHOD TYPE: SIGNIFICANT CHANGE UP
METHOD TYPE: SIGNIFICANT CHANGE UP
MONOCYTES # BLD AUTO: 0.73 K/UL — SIGNIFICANT CHANGE UP (ref 0–0.9)
MONOCYTES NFR BLD AUTO: 5.4 % — SIGNIFICANT CHANGE UP (ref 2–14)
NEUTROPHILS # BLD AUTO: 10.88 K/UL — HIGH (ref 1.8–7.4)
NEUTROPHILS NFR BLD AUTO: 81.2 % — HIGH (ref 43–77)
NRBC # BLD: 0 /100 WBCS — SIGNIFICANT CHANGE UP (ref 0–0)
ORGANISM # SPEC MICROSCOPIC CNT: SIGNIFICANT CHANGE UP
PHOSPHATE SERPL-MCNC: 3.3 MG/DL — SIGNIFICANT CHANGE UP (ref 2.5–4.5)
PLATELET # BLD AUTO: 231 K/UL — SIGNIFICANT CHANGE UP (ref 150–400)
POTASSIUM SERPL-MCNC: 4.9 MMOL/L — SIGNIFICANT CHANGE UP (ref 3.5–5.3)
POTASSIUM SERPL-SCNC: 4.9 MMOL/L — SIGNIFICANT CHANGE UP (ref 3.5–5.3)
PROT SERPL-MCNC: 6.6 G/DL — SIGNIFICANT CHANGE UP (ref 6–8.3)
PROTHROM AB SERPL-ACNC: 12.5 SEC — SIGNIFICANT CHANGE UP (ref 10.5–13.4)
RBC # BLD: 3.04 M/UL — LOW (ref 3.8–5.2)
RBC # FLD: 15.3 % — HIGH (ref 10.3–14.5)
SODIUM SERPL-SCNC: 130 MMOL/L — LOW (ref 135–145)
SPECIMEN SOURCE: SIGNIFICANT CHANGE UP
WBC # BLD: 13.41 K/UL — HIGH (ref 3.8–10.5)
WBC # FLD AUTO: 13.41 K/UL — HIGH (ref 3.8–10.5)

## 2022-07-21 PROCEDURE — 93010 ELECTROCARDIOGRAM REPORT: CPT | Mod: 76

## 2022-07-21 PROCEDURE — 99233 SBSQ HOSP IP/OBS HIGH 50: CPT | Mod: GC

## 2022-07-21 PROCEDURE — 93306 TTE W/DOPPLER COMPLETE: CPT | Mod: 26

## 2022-07-21 RX ORDER — SODIUM CHLORIDE 9 MG/ML
500 INJECTION, SOLUTION INTRAVENOUS
Refills: 0 | Status: DISCONTINUED | OUTPATIENT
Start: 2022-07-21 | End: 2022-07-21

## 2022-07-21 RX ORDER — CEFEPIME 1 G/1
500 INJECTION, POWDER, FOR SOLUTION INTRAMUSCULAR; INTRAVENOUS DAILY
Refills: 0 | Status: DISCONTINUED | OUTPATIENT
Start: 2022-07-21 | End: 2022-07-21

## 2022-07-21 RX ORDER — SODIUM CHLORIDE 9 MG/ML
500 INJECTION, SOLUTION INTRAVENOUS ONCE
Refills: 0 | Status: COMPLETED | OUTPATIENT
Start: 2022-07-21 | End: 2022-07-21

## 2022-07-21 RX ORDER — METOPROLOL TARTRATE 50 MG
5 TABLET ORAL ONCE
Refills: 0 | Status: COMPLETED | OUTPATIENT
Start: 2022-07-21 | End: 2022-07-21

## 2022-07-21 RX ORDER — CEFTRIAXONE 500 MG/1
INJECTION, POWDER, FOR SOLUTION INTRAMUSCULAR; INTRAVENOUS
Refills: 0 | Status: DISCONTINUED | OUTPATIENT
Start: 2022-07-21 | End: 2022-07-21

## 2022-07-21 RX ORDER — LABETALOL HCL 100 MG
2.5 TABLET ORAL ONCE
Refills: 0 | Status: DISCONTINUED | OUTPATIENT
Start: 2022-07-21 | End: 2022-07-21

## 2022-07-21 RX ORDER — PIPERACILLIN AND TAZOBACTAM 4; .5 G/20ML; G/20ML
3.38 INJECTION, POWDER, LYOPHILIZED, FOR SOLUTION INTRAVENOUS EVERY 12 HOURS
Refills: 0 | Status: DISCONTINUED | OUTPATIENT
Start: 2022-07-22 | End: 2022-07-22

## 2022-07-21 RX ORDER — ACETAMINOPHEN 500 MG
650 TABLET ORAL ONCE
Refills: 0 | Status: COMPLETED | OUTPATIENT
Start: 2022-07-21 | End: 2022-07-21

## 2022-07-21 RX ORDER — ACETAMINOPHEN 500 MG
650 TABLET ORAL EVERY 6 HOURS
Refills: 0 | Status: DISCONTINUED | OUTPATIENT
Start: 2022-07-21 | End: 2022-07-28

## 2022-07-21 RX ORDER — METOPROLOL TARTRATE 50 MG
25 TABLET ORAL
Refills: 0 | Status: DISCONTINUED | OUTPATIENT
Start: 2022-07-21 | End: 2022-07-28

## 2022-07-21 RX ORDER — CEFEPIME 1 G/1
1000 INJECTION, POWDER, FOR SOLUTION INTRAMUSCULAR; INTRAVENOUS EVERY 12 HOURS
Refills: 0 | Status: DISCONTINUED | OUTPATIENT
Start: 2022-07-21 | End: 2022-07-21

## 2022-07-21 RX ORDER — CEFEPIME 1 G/1
1000 INJECTION, POWDER, FOR SOLUTION INTRAMUSCULAR; INTRAVENOUS DAILY
Refills: 0 | Status: DISCONTINUED | OUTPATIENT
Start: 2022-07-21 | End: 2022-07-21

## 2022-07-21 RX ORDER — CEFEPIME 1 G/1
INJECTION, POWDER, FOR SOLUTION INTRAMUSCULAR; INTRAVENOUS
Refills: 0 | Status: DISCONTINUED | OUTPATIENT
Start: 2022-07-21 | End: 2022-07-21

## 2022-07-21 RX ORDER — CEFTRIAXONE 500 MG/1
1000 INJECTION, POWDER, FOR SOLUTION INTRAMUSCULAR; INTRAVENOUS ONCE
Refills: 0 | Status: COMPLETED | OUTPATIENT
Start: 2022-07-21 | End: 2022-07-21

## 2022-07-21 RX ORDER — PIPERACILLIN AND TAZOBACTAM 4; .5 G/20ML; G/20ML
3.38 INJECTION, POWDER, LYOPHILIZED, FOR SOLUTION INTRAVENOUS ONCE
Refills: 0 | Status: COMPLETED | OUTPATIENT
Start: 2022-07-21 | End: 2022-07-21

## 2022-07-21 RX ADMIN — Medication 25 MILLIGRAM(S): at 21:11

## 2022-07-21 RX ADMIN — Medication 5 MILLIGRAM(S): at 06:07

## 2022-07-21 RX ADMIN — PIPERACILLIN AND TAZOBACTAM 25 GRAM(S): 4; .5 INJECTION, POWDER, LYOPHILIZED, FOR SOLUTION INTRAVENOUS at 07:01

## 2022-07-21 RX ADMIN — Medication 200 MILLIGRAM(S): at 09:51

## 2022-07-21 RX ADMIN — Medication 25 MILLIGRAM(S): at 11:47

## 2022-07-21 RX ADMIN — SODIUM CHLORIDE 100 MILLILITER(S): 9 INJECTION, SOLUTION INTRAVENOUS at 09:50

## 2022-07-21 RX ADMIN — Medication 200 MILLIGRAM(S): at 17:43

## 2022-07-21 RX ADMIN — Medication 3 MILLILITER(S): at 00:08

## 2022-07-21 RX ADMIN — CEFTRIAXONE 100 MILLIGRAM(S): 500 INJECTION, POWDER, FOR SOLUTION INTRAMUSCULAR; INTRAVENOUS at 11:30

## 2022-07-21 RX ADMIN — SODIUM CHLORIDE 500 MILLILITER(S): 9 INJECTION, SOLUTION INTRAVENOUS at 05:30

## 2022-07-21 RX ADMIN — Medication 3 MILLILITER(S): at 17:42

## 2022-07-21 RX ADMIN — Medication 2 TABLET(S): at 00:09

## 2022-07-21 RX ADMIN — Medication 5 MILLIGRAM(S): at 05:29

## 2022-07-21 RX ADMIN — Medication 3 MILLILITER(S): at 11:32

## 2022-07-21 RX ADMIN — Medication 2 TABLET(S): at 21:11

## 2022-07-21 RX ADMIN — Medication 650 MILLIGRAM(S): at 00:09

## 2022-07-21 RX ADMIN — Medication 650 MILLIGRAM(S): at 22:23

## 2022-07-21 RX ADMIN — Medication 650 MILLIGRAM(S): at 12:31

## 2022-07-21 RX ADMIN — PIPERACILLIN AND TAZOBACTAM 200 GRAM(S): 4; .5 INJECTION, POWDER, LYOPHILIZED, FOR SOLUTION INTRAVENOUS at 23:47

## 2022-07-21 RX ADMIN — Medication 5 MILLIGRAM(S): at 05:13

## 2022-07-21 RX ADMIN — Medication 650 MILLIGRAM(S): at 11:31

## 2022-07-21 NOTE — PROVIDER CONTACT NOTE (OTHER) - SITUATION
Pt has Bp of 160/95 and temperature of 100.5.
received call from lab about critical blood cultures. One aerobic growth gram negative evin and one preliminary growth anaerobic and aerobic gram negative evin
pt complaining of stomach cramps
pt has temperature of 100.2 F, blood cultures ordered for this AM, no prn for tylenol
pt tachycardic to 150s-160s

## 2022-07-21 NOTE — PHYSICAL THERAPY INITIAL EVALUATION ADULT - ADDITIONAL COMMENTS
prior to fall, pt was independent without AD. lives alone in ranch style house with 4 steps to enter, L handrail ascending. has basement at home.

## 2022-07-21 NOTE — PROGRESS NOTE ADULT - PROBLEM SELECTOR PLAN 6
Patient reportedly O2 sat 80% prior to ER arrival  Now saturating 97% on 2L NC  CXR:  Multiple nodular opacities within the right lung and a single nodular   opacity adjacent to left hilum measuring up to 1.4 cm.  - CT chest demonstrates small pleural effusions b/l, no evidence of pneumonia.  RVP negative. Patient reportedly O2 sat 80% prior to ER arrival  Now saturating 97% on 2L NC, wean as tolerated   CXR:  Multiple nodular opacities within the right lung and a single nodular   opacity adjacent to left hilum measuring up to 1.4 cm.  - CT chest demonstrates small pleural effusions b/l, no evidence of pneumonia.  RVP negative.

## 2022-07-21 NOTE — PROGRESS NOTE ADULT - ATTENDING COMMENTS
above plans discussed with Dr. Ribera    # acute metabolic encephalopathy  # sepsis 2/2 UTI, GNR bacteremia  # acute hypoxic respiratory failure  # new onset Afib with RVR  # acute anemia  # LIAM    - overnight, noted to have new onset AFib with RVR @ 150-160s  - no previous diagnosis of Afib, likely in setting of sepsis; [id number] 4 but elevated HASBLED score as well, also with frequent falls  - obtain TTE to assess cardiac function  - cardiology consulted: will discuss with patient for systemic AC vs. ILR  - leukocytosis trending down, D-dimer trending down as well; VQ scan low probability for PE  - BCx with E.Coli, sensitive to ceftriaxone  - s/p 1 unit of pRBC transfusion with appropriate response; trend H/H and transfuse as needed for goal hgb >7; CTAP negative for RPB  - LIAM likely in setting of volume loss as well as acute infection  - CT chest without signs of PNA  - PT recommends EVA Diaz MD  Division of Hospital Medicine  Contact via Microsoft Teams  Office: [phone]

## 2022-07-21 NOTE — PHYSICAL THERAPY INITIAL EVALUATION ADULT - ACTIVE RANGE OF MOTION EXAMINATION, REHAB EVAL
unable to formally assess RUE due to immobilization prec/Left UE Active ROM was WNL (within normal limits)/bilateral lower extremity Active ROM was WNL (within normal limits)

## 2022-07-21 NOTE — PHYSICAL THERAPY INITIAL EVALUATION ADULT - PLANNED THERAPY INTERVENTIONS, PT EVAL
GOAL: Pt will negotiate up/down 4 steps with left handrail ascending independently in 2 weeks/balance training/bed mobility training/gait training/strengthening/transfer training

## 2022-07-21 NOTE — CONSULT NOTE ADULT - SUBJECTIVE AND OBJECTIVE BOX
DATE OF SERVICE: 07-21-22 @ 12:50    CHIEF COMPLAINT:Patient is a 89y old  Female who presents with a chief complaint of Fever, cough, confusion (21 Jul 2022 07:40)      HISTORY OF PRESENT ILLNESS: (Patient with second MRN: [id number])    89F w/ hx of HTN, IBS, osteoporosis, anemia, recent admission fpr fall and R shoulder dislocation and Hill-Sachs lesion/ fracture and discharged to rehab presents for fever, lethargy, altered mental status and reported hypoxia for 2 days. Patient reports non-productive cough, weakness and fatigue as well as shortness of breath. Denies chest pain. Has abdominal pain, mostly lower abdominal/suprapubic, but denies dysuria. No N/V/D. Has not had BM in few days reportedly, does feel constipated, no pain medications or opiate use. Reports fevers but no chills. No sputum production. She reports she used to walk a mile a day before her recent fall, has since not been very ambulatory. At baseline walked without assistance. Denies dizziness or lightheadedness.       PAST MEDICAL & SURGICAL HISTORY:  HTN (hypertension)      Osteoporosis      IBS (irritable bowel syndrome)      No significant past surgical history              MEDICATIONS:  metoprolol tartrate 25 milliGRAM(s) Oral two times a day    cefTRIAXone   IVPB        albuterol/ipratropium for Nebulization 3 milliLiter(s) Nebulizer every 6 hours  guaiFENesin Oral Liquid (Sugar-Free) 200 milliGRAM(s) Oral every 6 hours PRN    acetaminophen     Tablet .. 650 milliGRAM(s) Oral every 6 hours PRN    calcium carbonate    500 mG (Tums) Chewable 2 Tablet(s) Chew at bedtime  polyethylene glycol 3350 17 Gram(s) Oral daily      lactated ringers. 500 milliLiter(s) IV Continuous <Continuous>  lidocaine   4% Patch 1 Patch Transdermal every 24 hours      FAMILY HISTORY:  FH: gastric cancer (Mother)    FH: diabetes mellitus (Father)    FH: heart disease (Father)        Non-contributory    SOCIAL HISTORY:  Limited contributor  [ ] Tobacco  [ ] Drugs  [ ] Alcohol    Allergies    No Known Allergies    Intolerances    	    REVIEW OF SYSTEMS: Limited contributor  CONSTITUTIONAL: No fever  EYES: No eye pain, visual disturbances, or discharge  ENMT:  No difficulty hearing, tinnitus  NECK: No pain or stiffness  RESPIRATORY: No cough, wheezing,  CARDIOVASCULAR: No chest pain, palpitations, passing out, dizziness, or leg swelling  GASTROINTESTINAL:  No nausea, vomiting, diarrhea or constipation. No melena.  GENITOURINARY: No dysuria, hematuria  NEUROLOGICAL: No stroke like symptoms  SKIN: No burning or lesions   ENDOCRINE: No heat or cold intolerance  MUSCULOSKELETAL: No joint pain or swelling  PSYCHIATRIC: No  anxiety, mood swings  HEME/LYMPH: No bleeding gums  ALLERGY AND IMMUNOLOGIC: No hives or eczema	    All other ROS negative    PHYSICAL EXAM:  T(C): 36.8 (07-21-22 @ 04:15), Max: 37.1 (07-20-22 @ 14:06)  HR: 78 (07-21-22 @ 11:21) (78 - 163)  BP: 135/85 (07-21-22 @ 11:21) (105/62 - 154/89)  RR: 18 (07-21-22 @ 06:34) (18 - 18)  SpO2: 98% (07-21-22 @ 11:21) (96% - 100%)  Wt(kg): --  I&O's Summary    20 Jul 2022 07:01  -  21 Jul 2022 07:00  --------------------------------------------------------  IN: 240 mL / OUT: 250 mL / NET: -10 mL        Appearance: Normal	  HEENT:   Normal oral mucosa, EOMI	  Cardiovascular:  S1 S2, No JVD,    Respiratory: Lungs clear to auscultation	  Psychiatry: Alert  Gastrointestinal:  Soft, Non-tender, + BS	  Skin: No rashes   Neurologic: Non-focal  Extremities:  No edema  Vascular: Peripheral pulses palpable    	    	  	  CARDIAC MARKERS:  Labs personally reviewed by me                                  9.2    13.41 )-----------( 231      ( 21 Jul 2022 07:32 )             27.6     07-21    130<L>  |  98  |  28<H>  ----------------------------<  123<H>  4.9   |  19<L>  |  1.49<H>    Ca    8.7      21 Jul 2022 07:35  Phos  3.3     07-21  Mg     2.3     07-21    TPro  6.6  /  Alb  3.0<L>  /  TBili  0.5  /  DBili  x   /  AST  81<H>  /  ALT  64<H>  /  AlkPhos  127<H>  07-21          EKG: Personally reviewed by me -   Radiology: Personally reviewed by me -     Xray Chest 1 View AP/PA (07.20.22 @ 14:46) >  IMPRESSION:  Trace bilateral pleural effusions.  Right proximal humerus fracture.    CT Abdomen and Pelvis No Cont (07.19.22 @ 16:53) >  IMPRESSION:  No evidence of retroperitoneal hematoma.    2.5 cm right adnexal cyst.    NM Pulmonary Ventilation/Perfusion Scan (07.20.22 @ 13:43) >  Low probability of pulmonary embolus.    VA Duplex Lower Ext Vein Scan, Bilat (07.19.22 @ 15:31) >    IMPRESSION:  No evidence of deep venous thrombosis in either lower extremity.          Assessment /Plan:     Ms. Hadley is a 89 yo female with PMH of HTN, IBS, osteoporosis, anemia, recent admission fpr fall and R shoulder dislocation and Hill-Sachs lesion/ fracture and discharged to rehab presents for fever, lethargy, altered mental status and reported hypoxia for 2 days found to have e-coli urosepsis and now with new onset AF.    Problem/Plan -1  Problem: New Onset Atrial Fibrillation  - Chadsvasc score 4   - Likely d/t sepsis vs. dehydration vs. anemia  - Consider Metoprolol 25mg PO BID with hold parameters, IVP lopressor PRN for tachycardia  - Consider Apixaban 2.5mg PO BID  - Check TTE  - Monitor on tele  - Supportive care as per primary team    Problem/Plan -2  Problem: HTN  - previously on olmesartan  - Cont to hold olmesartan to provide room for rate control medication    Problem/Plan -3  Problem: Anemia  - CT A/P negative for RP bleed  - CBC stable --> cont to monitor closely and transfuse < 7    Problem/Plan -2  Problem: E-Coli Bacteremia  - Urine and Blood Cultures + e-coli  - c/w IV Abx   - monitor fever curve, WBC trend    Differential diagnosis and plan of care discussed with patient after the evaluation. Counseling on diet, nutritional counseling, weight management, exercise and medication compliance was done.   Advanced care planning/advanced directives discussed with patient/family. DNR status including forceful chest compressions to attempt to restart the heart, ventilator support/artificial breathing, electric shock, artificial nutrition, health care proxy, Molst form all discussed with pt. Pt wishes to consider. More than fifteen minutes spent on discussing advanced directives.     Dorita Butt [facility]  Rodger Wharton DO [facility]  Cardiovascular Medicine  [address] [place] [address], [address]  Office [phone]  Cell [phone] DATE OF SERVICE: 07-21-22 @ 12:50    CHIEF COMPLAINT:Patient is a 89y old  Female who presents with a chief complaint of Fever, cough, confusion (21 Jul 2022 07:40)      HISTORY OF PRESENT ILLNESS: (Patient with second MRN: [id number])    89F w/ hx of HTN, IBS, osteoporosis, anemia, recent admission fpr fall and R shoulder dislocation and Hill-Sachs lesion/ fracture and discharged to rehab presents for fever, lethargy, altered mental status and reported hypoxia for 2 days. Patient reports non-productive cough, weakness and fatigue as well as shortness of breath. Denies chest pain. Has abdominal pain, mostly lower abdominal/suprapubic, but denies dysuria. No N/V/D. Has not had BM in few days reportedly, does feel constipated, no pain medications or opiate use. Reports fevers but no chills. No sputum production. She reports she used to walk a mile a day before her recent fall, has since not been very ambulatory. At baseline walked without assistance. Denies dizziness or lightheadedness.       PAST MEDICAL & SURGICAL HISTORY:  HTN (hypertension)      Osteoporosis      IBS (irritable bowel syndrome)      No significant past surgical history              MEDICATIONS:  metoprolol tartrate 25 milliGRAM(s) Oral two times a day    cefTRIAXone   IVPB        albuterol/ipratropium for Nebulization 3 milliLiter(s) Nebulizer every 6 hours  guaiFENesin Oral Liquid (Sugar-Free) 200 milliGRAM(s) Oral every 6 hours PRN    acetaminophen     Tablet .. 650 milliGRAM(s) Oral every 6 hours PRN    calcium carbonate    500 mG (Tums) Chewable 2 Tablet(s) Chew at bedtime  polyethylene glycol 3350 17 Gram(s) Oral daily      lactated ringers. 500 milliLiter(s) IV Continuous <Continuous>  lidocaine   4% Patch 1 Patch Transdermal every 24 hours      FAMILY HISTORY:  FH: gastric cancer (Mother)    FH: diabetes mellitus (Father)    FH: heart disease (Father)        Non-contributory    SOCIAL HISTORY:  Limited contributor  [ ] Tobacco  [ ] Drugs  [ ] Alcohol    Allergies    No Known Allergies    Intolerances    	    REVIEW OF SYSTEMS: Limited contributor  CONSTITUTIONAL: No fever  EYES: No eye pain, visual disturbances, or discharge  ENMT:  No difficulty hearing, tinnitus  NECK: No pain or stiffness  RESPIRATORY: No cough, wheezing,  CARDIOVASCULAR: No chest pain, palpitations, passing out, dizziness, or leg swelling  GASTROINTESTINAL:  No nausea, vomiting, diarrhea or constipation. No melena.  GENITOURINARY: No dysuria, hematuria  NEUROLOGICAL: No stroke like symptoms  SKIN: No burning or lesions   ENDOCRINE: No heat or cold intolerance  MUSCULOSKELETAL: No joint pain or swelling  PSYCHIATRIC: No  anxiety, mood swings  HEME/LYMPH: No bleeding gums  ALLERGY AND IMMUNOLOGIC: No hives or eczema	    All other ROS negative    PHYSICAL EXAM:  T(C): 36.8 (07-21-22 @ 04:15), Max: 37.1 (07-20-22 @ 14:06)  HR: 78 (07-21-22 @ 11:21) (78 - 163)  BP: 135/85 (07-21-22 @ 11:21) (105/62 - 154/89)  RR: 18 (07-21-22 @ 06:34) (18 - 18)  SpO2: 98% (07-21-22 @ 11:21) (96% - 100%)  Wt(kg): --  I&O's Summary    20 Jul 2022 07:01  -  21 Jul 2022 07:00  --------------------------------------------------------  IN: 240 mL / OUT: 250 mL / NET: -10 mL        Appearance: Normal	  HEENT:   Normal oral mucosa, EOMI	  Cardiovascular:  S1 S2, No JVD,    Respiratory: Lungs clear to auscultation	  Psychiatry: Alert  Gastrointestinal:  Soft, Non-tender, + BS	  Skin: No rashes   Neurologic: Non-focal  Extremities:  No edema  Vascular: Peripheral pulses palpable    	    	  	  CARDIAC MARKERS:  Labs personally reviewed by me                                  9.2    13.41 )-----------( 231      ( 21 Jul 2022 07:32 )             27.6     07-21    130<L>  |  98  |  28<H>  ----------------------------<  123<H>  4.9   |  19<L>  |  1.49<H>    Ca    8.7      21 Jul 2022 07:35  Phos  3.3     07-21  Mg     2.3     07-21    TPro  6.6  /  Alb  3.0<L>  /  TBili  0.5  /  DBili  x   /  AST  81<H>  /  ALT  64<H>  /  AlkPhos  127<H>  07-21          EKG: Personally reviewed by me -   Radiology: Personally reviewed by me -     Xray Chest 1 View AP/PA (07.20.22 @ 14:46) >  IMPRESSION:  Trace bilateral pleural effusions.  Right proximal humerus fracture.    CT Abdomen and Pelvis No Cont (07.19.22 @ 16:53) >  IMPRESSION:  No evidence of retroperitoneal hematoma.    2.5 cm right adnexal cyst.    NM Pulmonary Ventilation/Perfusion Scan (07.20.22 @ 13:43) >  Low probability of pulmonary embolus.    VA Duplex Lower Ext Vein Scan, Bilat (07.19.22 @ 15:31) >    IMPRESSION:  No evidence of deep venous thrombosis in either lower extremity.          Assessment /Plan:     Ms. Hadley is a 89 yo female with PMH of HTN, IBS, osteoporosis, anemia, recent admission fpr fall and R shoulder dislocation and Hill-Sachs lesion/ fracture and discharged to rehab presents for fever, lethargy, altered mental status and reported hypoxia for 2 days found to have e-coli urosepsis and now with new onset AF.    Problem/Plan -1  Problem: New Onset Atrial Fibrillation  - Chadsvasc score 4   - Likely d/t sepsis vs. dehydration vs. anemia  - Consider Metoprolol 25mg PO BID with hold parameters, IVP lopressor PRN for tachycardia  - Consider Apixaban 2.5mg PO BID  - Check TTE  - Monitor on tele  - Supportive care as per primary team    Problem/Plan -2  Problem: HTN  - previously on olmesartan  - Cont to hold olmesartan to provide room for rate control medication    Problem/Plan -3  Problem: Anemia  - CT A/P negative for RP bleed  - CBC stable --> cont to monitor closely and transfuse Hgb< 7    Problem/Plan -2  Problem: E-Coli Bacteremia  - Urine and Blood Cultures + e-coli  - c/w IV Abx   - monitor fever curve, WBC trend    Differential diagnosis and plan of care discussed with patient after the evaluation. Counseling on diet, nutritional counseling, weight management, exercise and medication compliance was done.   Advanced care planning/advanced directives discussed with patient/family. DNR status including forceful chest compressions to attempt to restart the heart, ventilator support/artificial breathing, electric shock, artificial nutrition, health care proxy, Molst form all discussed with pt. Pt wishes to consider. More than fifteen minutes spent on discussing advanced directives.     Dorita Butt [facility]  Rodger Wharton DO [facility]  Cardiovascular Medicine  [address] [place] [address], [address]  Office [phone]  Cell [phone] DATE OF SERVICE: 07-21-22 @ 12:50    CHIEF COMPLAINT:Patient is a 89y old  Female who presents with a chief complaint of Fever, cough, confusion (21 Jul 2022 07:40)      HISTORY OF PRESENT ILLNESS: (Patient with second MRN: [id number])    89F w/ hx of HTN, IBS, osteoporosis, anemia, recent admission fpr fall and R shoulder dislocation and Hill-Sachs lesion/ fracture and discharged to rehab presents for fever, lethargy, altered mental status and reported hypoxia for 2 days. Patient reports non-productive cough, weakness and fatigue as well as shortness of breath. Denies chest pain. Has abdominal pain, mostly lower abdominal/suprapubic, but denies dysuria. No N/V/D. Has not had BM in few days reportedly, does feel constipated, no pain medications or opiate use. Reports fevers but no chills. No sputum production. She reports she used to walk a mile a day before her recent fall, has since not been very ambulatory. At baseline walked without assistance. Denies dizziness or lightheadedness.       PAST MEDICAL & SURGICAL HISTORY:  HTN (hypertension)      Osteoporosis      IBS (irritable bowel syndrome)      No significant past surgical history              MEDICATIONS:  metoprolol tartrate 25 milliGRAM(s) Oral two times a day    cefTRIAXone   IVPB        albuterol/ipratropium for Nebulization 3 milliLiter(s) Nebulizer every 6 hours  guaiFENesin Oral Liquid (Sugar-Free) 200 milliGRAM(s) Oral every 6 hours PRN    acetaminophen     Tablet .. 650 milliGRAM(s) Oral every 6 hours PRN    calcium carbonate    500 mG (Tums) Chewable 2 Tablet(s) Chew at bedtime  polyethylene glycol 3350 17 Gram(s) Oral daily      lactated ringers. 500 milliLiter(s) IV Continuous <Continuous>  lidocaine   4% Patch 1 Patch Transdermal every 24 hours      FAMILY HISTORY:  FH: gastric cancer (Mother)    FH: diabetes mellitus (Father)    FH: heart disease (Father)        Non-contributory    SOCIAL HISTORY:  Limited contributor  [ ] Tobacco  [ ] Drugs  [ ] Alcohol    Allergies    No Known Allergies    Intolerances    	    REVIEW OF SYSTEMS: Limited contributor  CONSTITUTIONAL: No fever  EYES: No eye pain, visual disturbances, or discharge  ENMT:  No difficulty hearing, tinnitus  NECK: No pain or stiffness  RESPIRATORY: No cough, wheezing,  CARDIOVASCULAR: No chest pain, palpitations, passing out, dizziness, or leg swelling  GASTROINTESTINAL:  No nausea, vomiting, diarrhea or constipation. No melena.  GENITOURINARY: No dysuria, hematuria  NEUROLOGICAL: No stroke like symptoms  SKIN: No burning or lesions   ENDOCRINE: No heat or cold intolerance  MUSCULOSKELETAL: No joint pain or swelling  PSYCHIATRIC: No  anxiety, mood swings  HEME/LYMPH: No bleeding gums  ALLERGY AND IMMUNOLOGIC: No hives or eczema	    All other ROS negative    PHYSICAL EXAM:  T(C): 36.8 (07-21-22 @ 04:15), Max: 37.1 (07-20-22 @ 14:06)  HR: 78 (07-21-22 @ 11:21) (78 - 163)  BP: 135/85 (07-21-22 @ 11:21) (105/62 - 154/89)  RR: 18 (07-21-22 @ 06:34) (18 - 18)  SpO2: 98% (07-21-22 @ 11:21) (96% - 100%)  Wt(kg): --  I&O's Summary    20 Jul 2022 07:01  -  21 Jul 2022 07:00  --------------------------------------------------------  IN: 240 mL / OUT: 250 mL / NET: -10 mL        Appearance: Normal	  HEENT:   Normal oral mucosa, EOMI	  Cardiovascular:  S1 S2, No JVD,    Respiratory: Lungs clear to auscultation	  Psychiatry: Alert  Gastrointestinal:  Soft, Non-tender, + BS	  Skin: No rashes   Neurologic: Non-focal  Extremities:  No edema  Vascular: Peripheral pulses palpable    	    	  	  CARDIAC MARKERS:  Labs personally reviewed by me                                  9.2    13.41 )-----------( 231      ( 21 Jul 2022 07:32 )             27.6     07-21    130<L>  |  98  |  28<H>  ----------------------------<  123<H>  4.9   |  19<L>  |  1.49<H>    Ca    8.7      21 Jul 2022 07:35  Phos  3.3     07-21  Mg     2.3     07-21    TPro  6.6  /  Alb  3.0<L>  /  TBili  0.5  /  DBili  x   /  AST  81<H>  /  ALT  64<H>  /  AlkPhos  127<H>  07-21          EKG: Personally reviewed by me -   Radiology: Personally reviewed by me -     Xray Chest 1 View AP/PA (07.20.22 @ 14:46) >  IMPRESSION:  Trace bilateral pleural effusions.  Right proximal humerus fracture.    CT Abdomen and Pelvis No Cont (07.19.22 @ 16:53) >  IMPRESSION:  No evidence of retroperitoneal hematoma.    2.5 cm right adnexal cyst.    NM Pulmonary Ventilation/Perfusion Scan (07.20.22 @ 13:43) >  Low probability of pulmonary embolus.    VA Duplex Lower Ext Vein Scan, Bilat (07.19.22 @ 15:31) >    IMPRESSION:  No evidence of deep venous thrombosis in either lower extremity.          Assessment /Plan:     Ms. Hadley is a 89 yo female with PMH of HTN, IBS, osteoporosis, anemia, recent admission fpr fall and R shoulder dislocation and Hill-Sachs lesion/ fracture and discharged to rehab presents for fever, lethargy, altered mental status and reported hypoxia for 2 days found to have e-coli urosepsis and now with new onset AF.    Problem/Plan -1  Problem: New Onset Atrial Fibrillation  - reported overnight with HR in the 150s  - Admission EKG with sinus tach and frequent PVCs  - If AF confirmed and blood loss anemia is ruled out I believe she would be a good candidate for Eliquis 2,5mg BID which has a similar bleeding profile to ASA 81mg  - Chadsvasc score 4   - Consider Metoprolol 25mg PO BID with hold parameters, IVP lopressor PRN for tachycardia  - Check TTE    Problem/Plan -2  Problem: HTN  - previously on olmesartan  - Cont to hold olmesartan to provide room for rate control medication    Problem/Plan -3  Problem: Anemia  - CT A/P negative for RP bleed  - CBC stable --> cont to monitor closely and transfuse Hgb< 7    Problem/Plan -2  Problem: E-Coli Bacteremia  - Urine and Blood Cultures + e-coli  - c/w IV Abx   - monitor fever curve, WBC trend      Attempted to reach pt's son twice and left a voicemail, will reattempt Friday AM          Differential diagnosis and plan of care discussed with patient after the evaluation. Counseling on diet, nutritional counseling, weight management, exercise and medication compliance was done.   Advanced care planning/advanced directives discussed with patient/family. DNR status including forceful chest compressions to attempt to restart the heart, ventilator support/artificial breathing, electric shock, artificial nutrition, health care proxy, Molst form all discussed with pt. Pt wishes to consider. More than fifteen minutes spent on discussing advanced directives.     Dorita Butt [facility]  Rodger Wharton DO [facility]  Cardiovascular Medicine  [address] [place] [address], [address]  Office [phone]  Cell [phone]

## 2022-07-21 NOTE — PROGRESS NOTE ADULT - PROBLEM SELECTOR PLAN 1
Fever, leukocytosis (WBC of 12.8), hypotension, tachycardia, Lactate of 1.7 upon presentation  - Blood culture and urine cultures positive for E. coli  - on zosyn w/ renal adjustments.   - trend cbc, monitor vital signs for infection, monitor temperature curve  - elevated d-dimer (>1000), V/Q scan negative

## 2022-07-21 NOTE — PROGRESS NOTE ADULT - PROBLEM SELECTOR PLAN 4
Suprapubic pain, leukocytosis.   UA with LE and bacteria  - Urine culture and Bcx positive for E. coli  - c/w zosyn renally adjusted Suprapubic pain, leukocytosis.   UA with LE and bacteria  - Urine culture and Bcx positive for E. coli  - s/p zosyn, narrowed to ceftriaxone 7/21 based on sensitivities

## 2022-07-21 NOTE — PROGRESS NOTE ADULT - SUBJECTIVE AND OBJECTIVE BOX
Authored by Marta Ribera MD, PGY3  PATIENT:  PAXTON MERINO  [id number]    CHIEF COMPLAINT:  Patient is a 89y old  Female who presents with a chief complaint of Fever, cough, confusion (20 Jul 2022 10:57)      INTERVAL HISTORY OVERNIGHT EVENTS: SYLVIA overnight.         MEDICATIONS:  MEDICATIONS  (STANDING):  albuterol/ipratropium for Nebulization 3 milliLiter(s) Nebulizer every 6 hours  calcium carbonate    500 mG (Tums) Chewable 2 Tablet(s) Chew at bedtime  lidocaine   4% Patch 1 Patch Transdermal every 24 hours  piperacillin/tazobactam IVPB.. 3.375 Gram(s) IV Intermittent every 12 hours  polyethylene glycol 3350 17 Gram(s) Oral daily    MEDICATIONS  (PRN):  guaiFENesin Oral Liquid (Sugar-Free) 200 milliGRAM(s) Oral every 6 hours PRN Cough      ALLERGIES:  Allergies    No Known Allergies    Intolerances        OBJECTIVE:  ICU Vital Signs Last 24 Hrs  T(C): 36.8 (21 Jul 2022 04:15), Max: 37.1 (20 Jul 2022 14:06)  T(F): 98.2 (21 Jul 2022 04:15), Max: 98.8 (20 Jul 2022 14:06)  HR: 122 (21 Jul 2022 07:02) (90 - 163)  BP: 126/78 (21 Jul 2022 07:02) (105/62 - 154/89)  BP(mean): --  ABP: --  ABP(mean): --  RR: 18 (21 Jul 2022 06:34) (18 - 18)  SpO2: 100% (21 Jul 2022 06:34) (96% - 100%)    O2 Parameters below as of 21 Jul 2022 06:34  Patient On (Oxygen Delivery Method): nasal cannula  O2 Flow (L/min): 2                I&O's Summary    20 Jul 2022 07:01  -  21 Jul 2022 07:00  --------------------------------------------------------  IN: 240 mL / OUT: 250 mL / NET: -10 mL          PHYSICAL EXAMINATION:  Constitutional: WDWN, NAD  HEENT: PERRL, EOMI, sclera non-icteric, neck supple, trachea midline, no masses, no JVD, MMM, good dentition  Respiratory: CTA b/l, good air entry b/l, no wheezing, no rhonchi, no rales, without accessory muscle use and no intercostal retractions  Cardiovascular: RRR, normal S1S2, no M/R/G  Gastrointestinal: soft, NTND, no masses palpable, BS normal  Extremities: Warm, well perfused, pulses equal bilateral upper and lower extremities, no edema, no clubbing. Capillary refill <2 sec  Neurological: AAOx3, CN Grossly intact  Skin: Normal temperature, warm, dry        LABS:                          9.6    17.08 )-----------( 216      ( 20 Jul 2022 07:53 )             28.2     07-20    131<L>  |  99  |  31<H>  ----------------------------<  122<H>  4.7   |  20<L>  |  1.44<H>    Ca    8.4      20 Jul 2022 07:53  Phos  2.7     07-20  Mg     2.2     07-20    TPro  6.5  /  Alb  3.0<L>  /  TBili  0.5  /  DBili  x   /  AST  27  /  ALT  25  /  AlkPhos  131<H>  07-20    LIVER FUNCTIONS - ( 20 Jul 2022 07:53 )  Alb: 3.0 g/dL / Pro: 6.5 g/dL / ALK PHOS: 131 U/L / ALT: 25 U/L / AST: 27 U/L / GGT: x           PT/INR - ( 20 Jul 2022 07:53 )   PT: 11.5 sec;   INR: 1.00 ratio         PTT - ( 20 Jul 2022 07:53 )  PTT:26.0 sec            TELEMETRY:     EKG:     IMAGING:       Authored by Marta Riebra MD, PGY3  PATIENT:  PAXTON MERINO  [id number]    CHIEF COMPLAINT:  Patient is a 89y old  Female who presents with a chief complaint of Fever, cough, confusion (20 Jul 2022 10:57)      INTERVAL HISTORY OVERNIGHT EVENTS: Patient with afib with RVR overnight, new. This AM, patient denies palpitations, SOB, chest pain.         MEDICATIONS:  MEDICATIONS  (STANDING):  albuterol/ipratropium for Nebulization 3 milliLiter(s) Nebulizer every 6 hours  calcium carbonate    500 mG (Tums) Chewable 2 Tablet(s) Chew at bedtime  lidocaine   4% Patch 1 Patch Transdermal every 24 hours  piperacillin/tazobactam IVPB.. 3.375 Gram(s) IV Intermittent every 12 hours  polyethylene glycol 3350 17 Gram(s) Oral daily    MEDICATIONS  (PRN):  guaiFENesin Oral Liquid (Sugar-Free) 200 milliGRAM(s) Oral every 6 hours PRN Cough      ALLERGIES:  Allergies    No Known Allergies    Intolerances        OBJECTIVE:  ICU Vital Signs Last 24 Hrs  T(C): 36.8 (21 Jul 2022 04:15), Max: 37.1 (20 Jul 2022 14:06)  T(F): 98.2 (21 Jul 2022 04:15), Max: 98.8 (20 Jul 2022 14:06)  HR: 122 (21 Jul 2022 07:02) (90 - 163)  BP: 126/78 (21 Jul 2022 07:02) (105/62 - 154/89)  BP(mean): --  ABP: --  ABP(mean): --  RR: 18 (21 Jul 2022 06:34) (18 - 18)  SpO2: 100% (21 Jul 2022 06:34) (96% - 100%)    O2 Parameters below as of 21 Jul 2022 06:34  Patient On (Oxygen Delivery Method): nasal cannula  O2 Flow (L/min): 2                I&O's Summary    20 Jul 2022 07:01  -  21 Jul 2022 07:00  --------------------------------------------------------  IN: 240 mL / OUT: 250 mL / NET: -10 mL          PHYSICAL EXAMINATION:  Constitutional: WDWN, NAD  HEENT: PERRL, EOMI, sclera non-icteric, neck supple, trachea midline, no masses, no JVD, MMM, good dentition  Respiratory: CTA b/l, good air entry b/l, no wheezing, no rhonchi, no rales, without accessory muscle use and no intercostal retractions  Cardiovascular: RRR, normal S1S2, no M/R/G  Gastrointestinal: soft, NTND, no masses palpable, BS normal  Extremities: Warm, well perfused, pulses equal bilateral upper and lower extremities, no edema, no clubbing. Capillary refill <2 sec  Neurological: AAOx3, CN Grossly intact  Skin: Normal temperature, warm, dry        LABS:                          9.6    17.08 )-----------( 216      ( 20 Jul 2022 07:53 )             28.2     07-20    131<L>  |  99  |  31<H>  ----------------------------<  122<H>  4.7   |  20<L>  |  1.44<H>    Ca    8.4      20 Jul 2022 07:53  Phos  2.7     07-20  Mg     2.2     07-20    TPro  6.5  /  Alb  3.0<L>  /  TBili  0.5  /  DBili  x   /  AST  27  /  ALT  25  /  AlkPhos  131<H>  07-20    LIVER FUNCTIONS - ( 20 Jul 2022 07:53 )  Alb: 3.0 g/dL / Pro: 6.5 g/dL / ALK PHOS: 131 U/L / ALT: 25 U/L / AST: 27 U/L / GGT: x           PT/INR - ( 20 Jul 2022 07:53 )   PT: 11.5 sec;   INR: 1.00 ratio         PTT - ( 20 Jul 2022 07:53 )  PTT:26.0 sec            TELEMETRY:     EKG:     IMAGING:

## 2022-07-21 NOTE — PHYSICAL THERAPY INITIAL EVALUATION ADULT - PERTINENT HX OF CURRENT PROBLEM, REHAB EVAL
89F PMHx of HTN, IBS, osteoporosis, anemia, w/recent admission for R shoulder dislocation and Hill-Sachs lesion/fracture due to fall presents from rehab facility for sepsis, hypoxic respiratory failure, acute metabolic encephalopathy d/t UTI in the setting of anemia and LIAM. Hosp course; imaging (-) for LLE DVT. 89F PMHx of HTN, IBS, osteoporosis, anemia, w/recent admission for R shoulder dislocation and Hill-Sachs lesion/fracture due to fall presents from rehab facility for sepsis, hypoxic respiratory failure, acute metabolic encephalopathy d/t UTI in the setting of anemia and LIAM. Hosp course; imaging (-) for LLE DVT; no signs of hemorrhage on CT abdomen pelvis 7/19 - no evidence of retroperitoneal hematoma.

## 2022-07-21 NOTE — PHYSICAL THERAPY INITIAL EVALUATION ADULT - GENERAL OBSERVATIONS, REHAB EVAL
Received supine in bed, sling for RUE adjacent to patient; +purewick, +2.5L supp 02 via NC, +Usman monitoring.

## 2022-07-21 NOTE — PHYSICAL THERAPY INITIAL EVALUATION ADULT - LEVEL OF INDEPENDENCE: SCOOT/BRIDGE, REHAB EVAL
incr cues for assuming hooklying position to scoot laterally, req incr assist bridging and scooting to HOB/minimum assist (75% patients effort)

## 2022-07-21 NOTE — PROVIDER CONTACT NOTE (OTHER) - ASSESSMENT
no s/s  of distress, other vitals within limits.
no s/s of distress, pt complaining of stomach discomfort
other vitals within limits
Pt states "not feeling well"
pt AOx4 forgetful at times on 2LNC at 98%.

## 2022-07-21 NOTE — PROGRESS NOTE ADULT - PROBLEM SELECTOR PLAN 2
-likely provoked by sepsis, hypovolemi, improved s/p 5IV lopressor x3, 500cc bolus  -tele?  -full AC? -likely provoked by sepsis, hypovolemi, improved s/p 5IV lopressor x3, 500cc bolus  -will continue 25mg PO lopressor BID with hold parameters  -CHADVASC  4points, 4.8%, HASBLED 2 points, 4.1%. Risk benefit discussion with patient's son and daughter in law at bedside today. They understand patient has risk of stroke off AC, however also at almost equal risk of major bleeding, including brain bleed while on AC. Patient's family to discuss, will hold AC for now.  -HD stable, monitor VS q4 hours.

## 2022-07-21 NOTE — PROVIDER CONTACT NOTE (OTHER) - BACKGROUND
pt admitted for sepsis d/t uti
pt admitted for sepsis due to UTI and PNA
padmitted for sepsis d/t pneumonia and UTI
Patient admitted for sepsis
admitted for sepsis, sob, ams, and pneumonia, s/p fall dislocated R shoulder.

## 2022-07-21 NOTE — PROVIDER CONTACT NOTE (OTHER) - ACTION/TREATMENT ORDERED:
MD to order prn tylenol
MD says she will come to assess
provider made aware, as per provider continue to monitor.
RN did EKG, EKG showed atrial fibrillation. MD to come see pt
per pt, tums would help her. MD to order tums and prn tylenol

## 2022-07-21 NOTE — PROVIDER CONTACT NOTE (OTHER) - REASON
Elevated BP and temp
critical blood cultures gram negative evin anaerobic and aerobic
pt complaining of stomach cramps
pt has temperature of 100.2 F, blood cultures ordered for this AM, no prn for tylenol
pt tachycardic to 150s-160s

## 2022-07-22 DIAGNOSIS — I35.0 NONRHEUMATIC AORTIC (VALVE) STENOSIS: ICD-10-CM

## 2022-07-22 LAB
ALBUMIN SERPL ELPH-MCNC: 2.6 G/DL — LOW (ref 3.3–5)
ALP SERPL-CCNC: 129 U/L — HIGH (ref 40–120)
ALT FLD-CCNC: 58 U/L — HIGH (ref 10–45)
ANION GAP SERPL CALC-SCNC: 13 MMOL/L — SIGNIFICANT CHANGE UP (ref 5–17)
AST SERPL-CCNC: 50 U/L — HIGH (ref 10–40)
BASE EXCESS BLDV CALC-SCNC: -4.1 MMOL/L — LOW (ref -2–2)
BASOPHILS # BLD AUTO: 0.04 K/UL — SIGNIFICANT CHANGE UP (ref 0–0.2)
BASOPHILS NFR BLD AUTO: 0.3 % — SIGNIFICANT CHANGE UP (ref 0–2)
BILIRUB SERPL-MCNC: 0.5 MG/DL — SIGNIFICANT CHANGE UP (ref 0.2–1.2)
BUN SERPL-MCNC: 29 MG/DL — HIGH (ref 7–23)
CA-I SERPL-SCNC: 1.2 MMOL/L — SIGNIFICANT CHANGE UP (ref 1.15–1.33)
CALCIUM SERPL-MCNC: 9 MG/DL — SIGNIFICANT CHANGE UP (ref 8.4–10.5)
CHLORIDE BLDV-SCNC: 101 MMOL/L — SIGNIFICANT CHANGE UP (ref 96–108)
CHLORIDE SERPL-SCNC: 98 MMOL/L — SIGNIFICANT CHANGE UP (ref 96–108)
CO2 BLDV-SCNC: 23 MMOL/L — SIGNIFICANT CHANGE UP (ref 22–26)
CO2 SERPL-SCNC: 19 MMOL/L — LOW (ref 22–31)
CREAT SERPL-MCNC: 1.43 MG/DL — HIGH (ref 0.5–1.3)
EGFR: 35 ML/MIN/1.73M2 — LOW
EOSINOPHIL # BLD AUTO: 0.35 K/UL — SIGNIFICANT CHANGE UP (ref 0–0.5)
EOSINOPHIL NFR BLD AUTO: 3.1 % — SIGNIFICANT CHANGE UP (ref 0–6)
GAS PNL BLDV: 129 MMOL/L — LOW (ref 136–145)
GAS PNL BLDV: SIGNIFICANT CHANGE UP
GAS PNL BLDV: SIGNIFICANT CHANGE UP
GLUCOSE BLDV-MCNC: 93 MG/DL — SIGNIFICANT CHANGE UP (ref 70–99)
GLUCOSE SERPL-MCNC: 100 MG/DL — HIGH (ref 70–99)
HCO3 BLDV-SCNC: 22 MMOL/L — SIGNIFICANT CHANGE UP (ref 22–29)
HCT VFR BLD CALC: 26 % — LOW (ref 34.5–45)
HCT VFR BLDA CALC: 27 % — LOW (ref 34.5–46.5)
HGB BLD CALC-MCNC: 9 G/DL — LOW (ref 11.7–16.1)
HGB BLD-MCNC: 8.6 G/DL — LOW (ref 11.5–15.5)
IMM GRANULOCYTES NFR BLD AUTO: 4.1 % — HIGH (ref 0–1.5)
LACTATE BLDV-MCNC: 2.1 MMOL/L — HIGH (ref 0.7–2)
LACTATE SERPL-SCNC: 1.2 MMOL/L — SIGNIFICANT CHANGE UP (ref 0.7–2)
LYMPHOCYTES # BLD AUTO: 1.38 K/UL — SIGNIFICANT CHANGE UP (ref 1–3.3)
LYMPHOCYTES # BLD AUTO: 12 % — LOW (ref 13–44)
MAGNESIUM SERPL-MCNC: 2.3 MG/DL — SIGNIFICANT CHANGE UP (ref 1.6–2.6)
MCHC RBC-ENTMCNC: 30.3 PG — SIGNIFICANT CHANGE UP (ref 27–34)
MCHC RBC-ENTMCNC: 33.1 GM/DL — SIGNIFICANT CHANGE UP (ref 32–36)
MCV RBC AUTO: 91.5 FL — SIGNIFICANT CHANGE UP (ref 80–100)
MONOCYTES # BLD AUTO: 0.66 K/UL — SIGNIFICANT CHANGE UP (ref 0–0.9)
MONOCYTES NFR BLD AUTO: 5.8 % — SIGNIFICANT CHANGE UP (ref 2–14)
NEUTROPHILS # BLD AUTO: 8.56 K/UL — HIGH (ref 1.8–7.4)
NEUTROPHILS NFR BLD AUTO: 74.7 % — SIGNIFICANT CHANGE UP (ref 43–77)
NRBC # BLD: 0 /100 WBCS — SIGNIFICANT CHANGE UP (ref 0–0)
PCO2 BLDV: 41 MMHG — SIGNIFICANT CHANGE UP (ref 39–42)
PH BLDV: 7.33 — SIGNIFICANT CHANGE UP (ref 7.32–7.43)
PHOSPHATE SERPL-MCNC: 3.7 MG/DL — SIGNIFICANT CHANGE UP (ref 2.5–4.5)
PLATELET # BLD AUTO: 247 K/UL — SIGNIFICANT CHANGE UP (ref 150–400)
PO2 BLDV: 61 MMHG — HIGH (ref 25–45)
POTASSIUM BLDV-SCNC: 5.3 MMOL/L — HIGH (ref 3.5–5.1)
POTASSIUM SERPL-MCNC: 5.2 MMOL/L — SIGNIFICANT CHANGE UP (ref 3.5–5.3)
POTASSIUM SERPL-SCNC: 5.2 MMOL/L — SIGNIFICANT CHANGE UP (ref 3.5–5.3)
PROT SERPL-MCNC: 6.4 G/DL — SIGNIFICANT CHANGE UP (ref 6–8.3)
RBC # BLD: 2.84 M/UL — LOW (ref 3.8–5.2)
RBC # FLD: 15.2 % — HIGH (ref 10.3–14.5)
SAO2 % BLDV: 93.4 % — HIGH (ref 67–88)
SODIUM SERPL-SCNC: 130 MMOL/L — LOW (ref 135–145)
WBC # BLD: 11.46 K/UL — HIGH (ref 3.8–10.5)
WBC # FLD AUTO: 11.46 K/UL — HIGH (ref 3.8–10.5)

## 2022-07-22 PROCEDURE — 99223 1ST HOSP IP/OBS HIGH 75: CPT

## 2022-07-22 PROCEDURE — 99233 SBSQ HOSP IP/OBS HIGH 50: CPT | Mod: GC

## 2022-07-22 RX ORDER — APIXABAN 2.5 MG/1
2.5 TABLET, FILM COATED ORAL EVERY 12 HOURS
Refills: 0 | Status: DISCONTINUED | OUTPATIENT
Start: 2022-07-22 | End: 2022-07-28

## 2022-07-22 RX ORDER — CEFTRIAXONE 500 MG/1
2000 INJECTION, POWDER, FOR SOLUTION INTRAMUSCULAR; INTRAVENOUS EVERY 24 HOURS
Refills: 0 | Status: DISCONTINUED | OUTPATIENT
Start: 2022-07-23 | End: 2022-07-24

## 2022-07-22 RX ORDER — CEFTRIAXONE 500 MG/1
INJECTION, POWDER, FOR SOLUTION INTRAMUSCULAR; INTRAVENOUS
Refills: 0 | Status: DISCONTINUED | OUTPATIENT
Start: 2022-07-22 | End: 2022-07-24

## 2022-07-22 RX ORDER — CEFTRIAXONE 500 MG/1
2000 INJECTION, POWDER, FOR SOLUTION INTRAMUSCULAR; INTRAVENOUS ONCE
Refills: 0 | Status: COMPLETED | OUTPATIENT
Start: 2022-07-22 | End: 2022-07-22

## 2022-07-22 RX ADMIN — Medication 650 MILLIGRAM(S): at 17:42

## 2022-07-22 RX ADMIN — Medication 25 MILLIGRAM(S): at 21:41

## 2022-07-22 RX ADMIN — APIXABAN 2.5 MILLIGRAM(S): 2.5 TABLET, FILM COATED ORAL at 21:41

## 2022-07-22 RX ADMIN — Medication 3 MILLILITER(S): at 17:43

## 2022-07-22 RX ADMIN — POLYETHYLENE GLYCOL 3350 17 GRAM(S): 17 POWDER, FOR SOLUTION ORAL at 11:43

## 2022-07-22 RX ADMIN — Medication 25 MILLIGRAM(S): at 09:42

## 2022-07-22 RX ADMIN — LIDOCAINE 1 PATCH: 4 CREAM TOPICAL at 21:43

## 2022-07-22 RX ADMIN — LIDOCAINE 1 PATCH: 4 CREAM TOPICAL at 06:59

## 2022-07-22 RX ADMIN — LIDOCAINE 1 PATCH: 4 CREAM TOPICAL at 06:22

## 2022-07-22 RX ADMIN — Medication 2 TABLET(S): at 21:40

## 2022-07-22 RX ADMIN — Medication 3 MILLILITER(S): at 11:43

## 2022-07-22 RX ADMIN — Medication 650 MILLIGRAM(S): at 00:15

## 2022-07-22 RX ADMIN — PIPERACILLIN AND TAZOBACTAM 25 GRAM(S): 4; .5 INJECTION, POWDER, LYOPHILIZED, FOR SOLUTION INTRAVENOUS at 06:23

## 2022-07-22 RX ADMIN — CEFTRIAXONE 100 MILLIGRAM(S): 500 INJECTION, POWDER, FOR SOLUTION INTRAMUSCULAR; INTRAVENOUS at 11:46

## 2022-07-22 NOTE — PROGRESS NOTE ADULT - ATTENDING COMMENTS
above plans discussed with Dr. Mackay    # acute metabolic encephalopathy  # sepsis 2/2 UTI, GNR bacteremia  # acute hypoxic respiratory failure  # new onset Afib with RVR  # acute anemia  # LIAM    - Tm 100.8 overnight but overall, HR much better controlled now  - no previous diagnosis of Afib, likely in setting of sepsis; [id number] 4 but elevated HASBLED score as well, also with frequent falls  - obtain TTE to assess cardiac function  - cardiology consulted: care discussed with Dr. Wharton - pt/son want to see ECHO result before deciding on systemic AC  - leukocytosis trending down, D-dimer trending down as well; VQ scan low probability for PE  - BCx with E.Coli, sensitive to ceftriaxone  - s/p 1 unit of pRBC transfusion with appropriate response; trend H/H and transfuse as needed for goal hgb >7; CTAP negative for RPB  - LIAM likely in setting of volume loss as well as acute infection  - CT chest without signs of PNA  - PT recommends EVA Diaz MD  Division of Hospital Medicine  Contact via [employer] Teams  Office: [phone]

## 2022-07-22 NOTE — PROGRESS NOTE ADULT - PROBLEM SELECTOR PLAN 4
Suprapubic pain, leukocytosis.   UA with LE and bacteria  - Urine culture and Bcx positive for E. coli  - s/p zosyn, narrowed to ceftriaxone 7/21 based on sensitivities

## 2022-07-22 NOTE — PROGRESS NOTE ADULT - ASSESSMENT
89F w/ hx of HTN, IBS, osteoporosis, anemia, recent admission for R shoulder dislocation and Hill-Sachs lesion/ fracture due to fall and discharged to rehab facility, presents from rehab facility for sepsis, hypoxic respiratory failure, acute metabolic encephalopathy secondary toUTI which are in the setting of anemia and LIAM.      89F w/ hx of HTN, IBS, osteoporosis, anemia, recent admission for R shoulder dislocation and Hill-Sachs lesion/ fracture due to fall and discharged to rehab facility, presents from rehab facility for sepsis secondary to UTI which are in the setting of anemia and LIAM. Found to have E coli in growing on urine and blood cultures on zosyn, course complicated by new onset Afib w/ RVR, currently on lopressor.      89F w/ hx of HTN, IBS, osteoporosis, anemia, recent admission for R shoulder dislocation and Hill-Sachs lesion/ fracture due to fall and discharged to rehab facility, presents from rehab facility for sepsis secondary to UTI which are in the setting of anemia and LIAM. Found to have E coli in growing on urine and blood cultures on zosyn, course complicated by new onset Afib w/ RVR, currently on lopressor.

## 2022-07-22 NOTE — CONSULT NOTE ADULT - SUBJECTIVE AND OBJECTIVE BOX
Structural Heart Team    HPI:    89F w/ hx of HTN, IBS, osteoporosis, anemia, recent admission fpr fall and R shoulder dislocation and Hill-Sachs lesion/ fracture and discharged to rehab presents for fever, lethargy, altered mental status and reported hypoxia for 2 days. Patient reports non-productive cough, weakness and fatigue as well as shortness of breath. Denies chest pain. Has abdominal pain, mostly lower abdominal/suprapubic, but denies dysuria. No N/V/D. Has not had BM in few days reportedly, does feel constipated, no pain medications or opiate use. Reports fevers but no chills. No sputum production. She reports she used to walk a mile a day before her recent fall, has since not been very ambulatory. At baseline walked without assistance. Denies dizziness or lightheadedness.     Today, the patient was resting comfortably in a chair, offering no complaints. Her son was at bedside during interview/exam. She was initially brought to [facility] with fever and AMS from Rehab with a possible infection, and was found to have a UTI. She was in rehab recovering from a trip and fall, where she fractured her right shoulder. Per the patient and son, it was not a syncopal episode, however she tripped over a chair. Prior to her trip and fall, the patient denied any dizziness, chest pain or pedal edema. She was actively walking, stating she could walk a mile, stopping once with mild sob which went away after a few seconds. Her son stated [place] she would walk around which was about 3/4 of a mile, plus she would walk on a treadmill with minimal problems. She sleeps with one pillow at night, denying have to use more pillows than that or sleeping in a chair. She lives on her own, and is independent in her ADL's. She was never a smoker, and she denies any ETOH.    STS 30 day Risk of AVR: 9.04%  NYHA Class II          07-21 @ 07:01  -  07-22 @ 07:00  --------------------------------------------------------  IN: 350 mL / OUT: 250 mL / NET: 100 mL        PAST MEDICAL & SURGICAL HISTORY:  HTN (hypertension)      Osteoporosis      IBS (irritable bowel syndrome)      No significant past surgical history          FAMILY HISTORY:  FH: gastric cancer (Mother)    FH: diabetes mellitus (Father)    FH: heart disease (Father)              No Known Allergies    albuterol/ipratropium for Nebulization 3 milliLiter(s) Nebulizer every 6 hours  calcium carbonate    500 mG (Tums) Chewable 2 Tablet(s) Chew at bedtime  cefTRIAXone   IVPB      lidocaine   4% Patch 1 Patch Transdermal every 24 hours  metoprolol tartrate 25 milliGRAM(s) Oral two times a day  polyethylene glycol 3350 17 Gram(s) Oral daily      T(C): 37.4 (07-22-22 @ 13:59), Max: 38.1 (07-21-22 @ 20:55)  HR: 67 (07-22-22 @ 13:59) (67 - 96)  BP: 145/73 (07-22-22 @ 13:59) (143/81 - 160/95)  RR: 18 (07-22-22 @ 13:59) (18 - 18)  SpO2: 100% (07-22-22 @ 13:59) (96% - 100%)  Wt(kg): --      Review of Symptoms:  General: Alert, Follows commands  Respiratory:  + SOB, + DUCKWORTH, No Cough  Cardiac: Denies CP, Denies Palpitations  Gastrointestinal: Denies Pain, Denies N/V  Extremities: Denies Pedal Edema, No Joint pain  Vascular: Negative  Neurological: Negative  Endocrine: negative      Physical Exam:  Gen: A/Ox3, NAD, LANNY  HEENT: No JVD, Neck Supple, Trachea midline, No masses, Old ecchymosis on r side of face  Pulmonary: CTAB,  No accessory muscle use for respiration  Cardiac: S1S2, RRR,  II/VI AIDEE  No Gallops/Rubs  ECG: SR  Gastrointestinal: Soft, NT/ND, + Bowel Sounds  Extremities:  R arm in sling,   Vascular: 1+ pulses B/L, No Bruits, No Edema  Neurological: Non Focal, = motor and sensory      Laboratory:                        8.6    11.46 )-----------( 247      ( 22 Jul 2022 07:07 )             26.0    07-22    130<L>  |  98  |  29<H>  ----------------------------<  100<H>  5.2   |  19<L>  |  1.43<H>    Ca    9.0      22 Jul 2022 06:52  Phos  3.7     07-22  Mg     2.3     07-22    TPro  6.4  /  Alb  2.6<L>  /  TBili  0.5  /  DBili  x   /  AST  50<H>  /  ALT  58<H>  /  AlkPhos  129<H>  07-22   PT/INR - ( 21 Jul 2022 07:33 )   PT: 12.5 sec;   INR: 1.08 ratio         PTT - ( 21 Jul 2022 07:33 )  PTT:24.1 sec    Pro-BNP:        Transthoracic Echo:      TransEsophageal Echo:< from: Transthoracic Echocardiogram (07.21.22 @ 17:08) >  Fractional short: 50 %  EF (Her Rule): 58 %Doppler Peak Velocity (m/sec):  MV=1.1 AoV=3.0  ------------------------------------------------------------------------  Observations:  Mitral Valve: Mitral annular calcification and calcified  mitral leaflets with decreased diastolic opening. Mild  mitral regurgitation.  Peak mitral valve gradient equals 5  mm Hg, mean transmitral valve gradient equals 3 mm Hg,  consistent with mild mitral stenosis. Gradients measured at  a HR of 80bpm.  Aortic Valve/Aorta: Calcified trileaflet aortic valve with  decreased opening. Peak transaortic valve gradient equals  36mm Hg, mean transaortic valve gradient equals 20 mm Hg,  estimated aortic valve area equals 0.7 sqcm (by continuity  equation), aortic valve velocity time integral equals 69  cm, consistent with severe aortic stenosis. Mild aortic  regurgitation.  Peak left ventricular outflow tract  gradient equals 3 mm Hg, mean gradient is equal to 2 mm Hg,  LVOT velocity time integral equals 20 cm.  Aortic Root: 3 cm.  LVOT diameter: 1.8 cm.  Left Atrium: Normal left atrium.  LA volume index = 26  cc/m2.  LeftVentricle: Normal left ventricular systolic function.  No segmental wall motion abnormalities. Proximal septal  thickening with mild concentric left ventricular  hypertrophy. Indeterminate diastolic function.  Right Heart: Normal right atrium. Normalright ventricular  size and function. Normal tricuspid valve. Mild tricuspid  regurgitation. Normal pulmonic valve. Mild pulmonic  regurgitation.  Pericardium/Pleura: Normal pericardium with no pericardial  effusion.  Hemodynamic: Estimated right atrial pressure is 8 mm Hg.  Estimated right ventricular systolic pressure equals 44 mm  Hg, assuming right atrial pressure equals 8 mm Hg,  consistent with mild pulmonary hypertension. Color Doppler  demonstrates no evidence of a patent foramen ovale.  ------------------------------------------------------------------------  Conclusions:  1. Mitral annular calcification and calcified mitral  leaflets with decreased diastolic opening. Mild mitral  regurgitation.  Peak mitral valve gradient equals 5 mm Hg,  mean transmitral valve gradient equals 3 mm Hg, consistent  with mild mitral stenosis. Gradients measured at a HR of  80bpm.  2. Calcified trileaflet aortic valve with decreased  opening. Peak transaortic valve gradient equals 36 mm Hg,  mean transaortic valve gradient equals 20 mm Hg, estimated  aortic valve area equals 0.7 sqcm (by continuity equation),  aortic valve velocity time integral equals 69 cm,  consistent with severe aortic stenosis. Mild aortic  regurgitation.  3. Normal left ventricular systolic function. No segmental  wall motion abnormalities.  4. Normal right ventricular size and function.  5. Estimated pulmonary artery systolic pressure equals 44  mm Hg, assuming right atrial pressure equals 8 mm Hg,  consistent with mild pulmonary pressures.  *** No previous Echo exam.    < end of copied text >      Cardiac Cath:

## 2022-07-22 NOTE — CONSULT NOTE ADULT - NS ATTEND AMEND GEN_ALL_CORE FT
Pt care and plan discussed and reviewed with NP. Plan as outlined above edited by me to reflect our discussion.
Events that transpired leading to the patient's current hospitalization reviewed.  The patient's past medical history/surgical history/family history/social history was gone over.  Agree with physical examination as noted above.    89-year-old female who presents status post mechanical fall with subsequent right shoulder dislocation/Hill-Sachs lesion/fracture with subsequent development of nonproductive cough, weakness, fatigue and shortness of breath.  The patient was found to have altered mental status and UTI.    Discussion was had with the patient and her son Connie (who was contacted on the phone, cell [phone]) concerning her clinical presentation and findings on echocardiogram study that demonstrates severe aortic valve stenosis.     The patient lives independently.  Prior to coming to the hospital she has been dependent with activities of daily living.  The patient cooks, drives and walks without assistance.  She can ambulate approximately three quarters of a mile at a normal pace.  The patient's family has not seen any significant changes in terms of her ability to carry out activities.  No reports of any significant shortness of breath, dyspnea upon exertion or fatigue.    Discussion was had what is severe symptomatic aortic valve stenosis.  The associated signs and symptoms severe aortic valve stenosis reviewed.  The natural pathophysiology of untreated severe symptomatic aortic valve stenosis was explained.  The different treatment options were reviewed which includes medical therapy, TAVR and SAVR.  The pros/cons and the risk/benefits of the various treatment options were gone over.      At this time the patient does not appear to have symptomatic severe aortic valve stenosis.  Would recommend the patient be closely monitored and a repeat echo be performed in approximately 3 months.  If there is any change in the patient's clinical symptoms prior to her follow-up visit she and her son were told to please contact her cardiology/valve team.    All questions or concerns of the patient and her son were addressed.    Findings and plan were discussed with cardiology/Dr. Wharton.

## 2022-07-22 NOTE — PROGRESS NOTE ADULT - PROBLEM SELECTOR PLAN 2
-likely provoked by sepsis, hypovolemi, improved s/p 5IV lopressor x3, 500cc bolus  -will continue 25mg PO lopressor BID with hold parameters  -CHADVASC  4points, 4.8%, HASBLED 2 points, 4.1%. Risk benefit discussion with patient's son and daughter in law at bedside today. They understand patient has risk of stroke off AC, however also at almost equal risk of major bleeding, including brain bleed while on AC. Patient's family to discuss, will hold AC for now.  -HD stable, monitor VS q4 hours.

## 2022-07-22 NOTE — PROGRESS NOTE ADULT - PROBLEM SELECTOR PLAN 6
Patient reportedly O2 sat 80% prior to ER arrival  Now saturating 97% on 2L NC, wean as tolerated   CXR:  Multiple nodular opacities within the right lung and a single nodular   opacity adjacent to left hilum measuring up to 1.4 cm.  - CT chest demonstrates small pleural effusions b/l, no evidence of pneumonia.  RVP negative.

## 2022-07-22 NOTE — CONSULT NOTE ADULT - PROBLEM SELECTOR RECOMMENDATION 9
Patient Alert and oriented, and currently offering no symptoms. Her TTE shows severe AS by LEATHA, however the velocity and mean gradient are more in the moderate range. It doesn't sound as though she had a syncopal episode, and as per the patient and her son, she has minimal to no symptoms.  She would also need a cardiac CT and a Cardiac Catheterization as part of the TAVR workup. Since she is relatively asymptomatic, the patient and the family would like to wait on her workup for TAVR to allow her time to recover from her injury and infection, which I do not disagree with. We can continue to follow up as an outpatient. I will discuss this with Dr. Fischer from the Structural Heart Team and Dr. Wharton. Dr. Fischer will see her later this afternoon.    SAVANNAH Ca  [id number]

## 2022-07-22 NOTE — PROGRESS NOTE ADULT - SUBJECTIVE AND OBJECTIVE BOX
**************************************  Aryasalina Codie, PGY-1  After 7PM, please contact night float at #[phone] or [phone]  **************************************    INTERVAL HPI/OVERNIGHT EVENTS:  Overnight pt had SBP to the 160s and a temperature of 100.5 F, pt was given metoprolol and antibiotics were broadened to zosyn, blood culture x1 was taken and pt was given Tylenol. Blood pressures resolved to 140s/80s and a temperature of 99 F.  No complaints at this time. Patient denies: fever, chills, dizziness, weakness, HA, Changes in vision, CP, palpitations, SOB, cough, N/V/D/C, dysuria, changes in bowel movements, LE edema. ROS otherwise negative.    VITAL SIGNS:  T(F): 99.3 (07-22-22 @ 04:42)  HR: 74 (07-22-22 @ 04:42)  BP: 144/77 (07-22-22 @ 04:42)  RR: 18 (07-22-22 @ 04:42)  SpO2: 96% (07-22-22 @ 04:42)  Wt(kg): --    PHYSICAL EXAM:    Constitutional: WDWN, NAD  HEENT: PERRL, EOMI, sclera non-icteric, neck supple, trachea midline, no masses, no JVD, MMM, good dentition  Respiratory: CTA b/l, good air entry b/l, no wheezing, no rhonchi, no rales, without accessory muscle use and no intercostal retractions  Cardiovascular: RRR, normal S1S2, no M/R/G  Gastrointestinal: soft, NTND, no masses palpable, BS normal  Extremities: Warm, well perfused, pulses equal bilateral upper and lower extremities, no edema, no clubbing. Capillary refill <2 sec  Neurological: AAOx3, CN Grossly intact  Skin: Normal temperature, warm, dry    MEDICATIONS  (STANDING):  albuterol/ipratropium for Nebulization 3 milliLiter(s) Nebulizer every 6 hours  calcium carbonate    500 mG (Tums) Chewable 2 Tablet(s) Chew at bedtime  lidocaine   4% Patch 1 Patch Transdermal every 24 hours  metoprolol tartrate 25 milliGRAM(s) Oral two times a day  piperacillin/tazobactam IVPB.. 3.375 Gram(s) IV Intermittent every 12 hours  polyethylene glycol 3350 17 Gram(s) Oral daily    MEDICATIONS  (PRN):  acetaminophen     Tablet .. 650 milliGRAM(s) Oral every 6 hours PRN Mild Pain (1 - 3), Moderate Pain (4 - 6)  guaiFENesin Oral Liquid (Sugar-Free) 200 milliGRAM(s) Oral every 6 hours PRN Cough      Allergies    No Known Allergies    Intolerances        LABS:                        9.2    13.41 )-----------( 231      ( 21 Jul 2022 07:32 )             27.6     07-21    130<L>  |  98  |  28<H>  ----------------------------<  123<H>  4.9   |  19<L>  |  1.49<H>    Ca    8.7      21 Jul 2022 07:35  Phos  3.3     07-21  Mg     2.3     07-21    TPro  6.6  /  Alb  3.0<L>  /  TBili  0.5  /  DBili  x   /  AST  81<H>  /  ALT  64<H>  /  AlkPhos  127<H>  07-21    PT/INR - ( 21 Jul 2022 07:33 )   PT: 12.5 sec;   INR: 1.08 ratio         PTT - ( 21 Jul 2022 07:33 )  PTT:24.1 sec      RADIOLOGY & ADDITIONAL TESTS:  Reviewed

## 2022-07-22 NOTE — PROGRESS NOTE ADULT - SUBJECTIVE AND OBJECTIVE BOX
DATE OF SERVICE: 07-22-22 @ 09:40    Patient is a 89y old  Female who presents with a chief complaint of Fever, cough, confusion (22 Jul 2022 06:44)      INTERVAL HISTORY: Feels ok.     REVIEW OF SYSTEMS:  CONSTITUTIONAL: No weakness  EYES/ENT: No visual changes;  No throat pain   NECK: No pain or stiffness  RESPIRATORY: No cough, wheezing; No shortness of breath  CARDIOVASCULAR: No chest pain or palpitations  GASTROINTESTINAL: No abdominal  pain. No nausea, vomiting, or hematemesis  GENITOURINARY: No dysuria, frequency or hematuria  NEUROLOGICAL: No stroke like symptoms  SKIN: No rashes    	  MEDICATIONS:  metoprolol tartrate 25 milliGRAM(s) Oral two times a day        PHYSICAL EXAM:  T(C): 37.4 (07-22-22 @ 04:42), Max: 38.1 (07-21-22 @ 20:55)  HR: 74 (07-22-22 @ 04:42) (74 - 110)  BP: 144/77 (07-22-22 @ 04:42) (111/72 - 160/95)  RR: 18 (07-22-22 @ 04:42) (18 - 18)  SpO2: 96% (07-22-22 @ 04:42) (96% - 100%)  Wt(kg): --  I&O's Summary    21 Jul 2022 07:01  -  22 Jul 2022 07:00  --------------------------------------------------------  IN: 350 mL / OUT: 250 mL / NET: 100 mL          Appearance: In no distress	  HEENT:    PERRL, EOMI	  Cardiovascular:  S1 S2, No JVD  Respiratory: Lungs clear to auscultation	  Gastrointestinal:  Soft, Non-tender, + BS	  Vascularature:  No edema of LE  Psychiatric: Appropriate affect   Neuro: no acute focal deficits                               8.6    11.46 )-----------( 247      ( 22 Jul 2022 07:07 )             26.0     07-22    130<L>  |  98  |  29<H>  ----------------------------<  100<H>  5.2   |  19<L>  |  1.43<H>    Ca    9.0      22 Jul 2022 06:52  Phos  3.7     07-22  Mg     2.3     07-22    TPro  6.4  /  Alb  2.6<L>  /  TBili  0.5  /  DBili  x   /  AST  50<H>  /  ALT  58<H>  /  AlkPhos  129<H>  07-22        Labs personally reviewed      ASSESSMENT/PLAN: 	    Ms. Hadley is a 89 yo female with PMH of HTN, IBS, osteoporosis, anemia, recent admission fpr fall and R shoulder dislocation and Hill-Sachs lesion/ fracture and discharged to rehab presents for fever, lethargy, altered mental status and reported hypoxia for 2 days found to have e-coli urosepsis and now with new onset AF.    Problem/Plan -1  Problem: New Onset Atrial Fibrillation  - reported overnight with HR in the 150s  - Admission EKG with sinus tach and frequent PVCs  - AF confirmed   - Redcommend Eliquis 2.5mg BID which has a similar bleeding profile to ASA 81mg  - Chadsvasc score 4   - Consider Metoprolol 25mg PO BID with hold parameters, IVP lopressor PRN for tachycardia  - TTE done, results pending    Problem/Plan -2  Problem: HTN  - previously on olmesartan  - Cont to hold olmesartan to provide room for rate control medication    Problem/Plan -3  Problem: Anemia  - CT A/P negative for RP bleed  - CBC stable --> cont to monitor closely and transfuse Hgb< 7    Problem/Plan -2  Problem: E-Coli Bacteremia  - Urine and Blood Cultures + e-coli  - c/w IV Abx   - monitor fever curve, WBC trend      Spoke with Patient's son to discuss risk of AF recurrence as well as risk vs. benefits of lifelong AC. He will discuss with family and decide about AC.           ALTAGRACIA Mcege-ESTELLE Wharton DO [facility]  Cardiovascular Medicine  [address], [address]  Office: [phone]  Cell: [phone] DATE OF SERVICE: 07-22-22 @ 09:40    Patient is a 89y old  Female who presents with a chief complaint of Fever, cough, confusion (22 Jul 2022 06:44)      INTERVAL HISTORY: Feels ok.     REVIEW OF SYSTEMS:  CONSTITUTIONAL: No weakness  EYES/ENT: No visual changes;  No throat pain   NECK: No pain or stiffness  RESPIRATORY: No cough, wheezing; No shortness of breath  CARDIOVASCULAR: No chest pain or palpitations  GASTROINTESTINAL: No abdominal  pain. No nausea, vomiting, or hematemesis  GENITOURINARY: No dysuria, frequency or hematuria  NEUROLOGICAL: No stroke like symptoms  SKIN: No rashes    	  MEDICATIONS:  metoprolol tartrate 25 milliGRAM(s) Oral two times a day        PHYSICAL EXAM:  T(C): 37.4 (07-22-22 @ 04:42), Max: 38.1 (07-21-22 @ 20:55)  HR: 74 (07-22-22 @ 04:42) (74 - 110)  BP: 144/77 (07-22-22 @ 04:42) (111/72 - 160/95)  RR: 18 (07-22-22 @ 04:42) (18 - 18)  SpO2: 96% (07-22-22 @ 04:42) (96% - 100%)  Wt(kg): --  I&O's Summary    21 Jul 2022 07:01  -  22 Jul 2022 07:00  --------------------------------------------------------  IN: 350 mL / OUT: 250 mL / NET: 100 mL          Appearance: In no distress	  HEENT:    PERRL, EOMI	  Cardiovascular:  S1 S2, No JVD  Respiratory: Lungs clear to auscultation	  Gastrointestinal:  Soft, Non-tender, + BS	  Vascularature:  No edema of LE  Psychiatric: Appropriate affect   Neuro: no acute focal deficits                               8.6    11.46 )-----------( 247      ( 22 Jul 2022 07:07 )             26.0     07-22    130<L>  |  98  |  29<H>  ----------------------------<  100<H>  5.2   |  19<L>  |  1.43<H>    Ca    9.0      22 Jul 2022 06:52  Phos  3.7     07-22  Mg     2.3     07-22    TPro  6.4  /  Alb  2.6<L>  /  TBili  0.5  /  DBili  x   /  AST  50<H>  /  ALT  58<H>  /  AlkPhos  129<H>  07-22        Labs personally reviewed      ASSESSMENT/PLAN: 	    Ms. Hadley is a 89 yo female with PMH of HTN, IBS, osteoporosis, anemia, recent admission fpr fall and R shoulder dislocation and Hill-Sachs lesion/ fracture and discharged to rehab presents for fever, lethargy, altered mental status and reported hypoxia for 2 days found to have e-coli urosepsis and now with new onset AF.    Problem/Plan -1  Problem: New Onset Atrial Fibrillation  - reported overnight with HR in the 150s  - Admission EKG with sinus tach and frequent PVCs  - AF confirmed   - Redcommend Eliquis 2.5mg BID which has a similar bleeding profile to ASA 81mg  - Chadsvasc score 4   - Consider Metoprolol 25mg PO BID with hold parameters, IVP lopressor PRN for tachycardia  - TTE reveals preserved EF with severe AS -- Appreciate Structural Heart Consultation    Problem/Plan -2  Problem: HTN  - previously on olmesartan  - Cont to hold olmesartan to provide room for rate control medication    Problem/Plan -3  Problem: Anemia  - CT A/P negative for RP bleed  - CBC stable --> cont to monitor closely and transfuse Hgb< 7    Problem/Plan -2  Problem: E-Coli Bacteremia  - Urine and Blood Cultures + e-coli  - c/w IV Abx   - monitor fever curve, WBC trend      Spoke with Patient's son to discuss risk of AF recurrence as well as risk vs. benefits of lifelong AC. He will discuss with family and decide about AC.           ALTAGRACIA Mcgee-ESTELLE Wharton DO [facility]  Cardiovascular Medicine  [address], [address]  Office: [phone]  Cell: [phone]

## 2022-07-23 LAB
ALBUMIN SERPL ELPH-MCNC: 2.9 G/DL — LOW (ref 3.3–5)
ALP SERPL-CCNC: 139 U/L — HIGH (ref 40–120)
ALT FLD-CCNC: 59 U/L — HIGH (ref 10–45)
ANION GAP SERPL CALC-SCNC: 15 MMOL/L — SIGNIFICANT CHANGE UP (ref 5–17)
APTT BLD: 27 SEC — LOW (ref 27.5–35.5)
AST SERPL-CCNC: 41 U/L — HIGH (ref 10–40)
BILIRUB SERPL-MCNC: 0.3 MG/DL — SIGNIFICANT CHANGE UP (ref 0.2–1.2)
BUN SERPL-MCNC: 26 MG/DL — HIGH (ref 7–23)
CALCIUM SERPL-MCNC: 9.5 MG/DL — SIGNIFICANT CHANGE UP (ref 8.4–10.5)
CHLORIDE SERPL-SCNC: 98 MMOL/L — SIGNIFICANT CHANGE UP (ref 96–108)
CO2 SERPL-SCNC: 21 MMOL/L — LOW (ref 22–31)
CREAT SERPL-MCNC: 1.49 MG/DL — HIGH (ref 0.5–1.3)
EGFR: 33 ML/MIN/1.73M2 — LOW
GLUCOSE SERPL-MCNC: 115 MG/DL — HIGH (ref 70–99)
HCT VFR BLD CALC: 29.1 % — LOW (ref 34.5–45)
HGB BLD-MCNC: 9.5 G/DL — LOW (ref 11.5–15.5)
INR BLD: 1.15 RATIO — SIGNIFICANT CHANGE UP (ref 0.88–1.16)
MCHC RBC-ENTMCNC: 30.1 PG — SIGNIFICANT CHANGE UP (ref 27–34)
MCHC RBC-ENTMCNC: 32.6 GM/DL — SIGNIFICANT CHANGE UP (ref 32–36)
MCV RBC AUTO: 92.1 FL — SIGNIFICANT CHANGE UP (ref 80–100)
NRBC # BLD: 0 /100 WBCS — SIGNIFICANT CHANGE UP (ref 0–0)
PLATELET # BLD AUTO: 313 K/UL — SIGNIFICANT CHANGE UP (ref 150–400)
POTASSIUM SERPL-MCNC: 5.1 MMOL/L — SIGNIFICANT CHANGE UP (ref 3.5–5.3)
POTASSIUM SERPL-SCNC: 5.1 MMOL/L — SIGNIFICANT CHANGE UP (ref 3.5–5.3)
PROT SERPL-MCNC: 6.7 G/DL — SIGNIFICANT CHANGE UP (ref 6–8.3)
PROTHROM AB SERPL-ACNC: 13.4 SEC — SIGNIFICANT CHANGE UP (ref 10.5–13.4)
RBC # BLD: 3.16 M/UL — LOW (ref 3.8–5.2)
RBC # FLD: 14.8 % — HIGH (ref 10.3–14.5)
SODIUM SERPL-SCNC: 134 MMOL/L — LOW (ref 135–145)
WBC # BLD: 13.91 K/UL — HIGH (ref 3.8–10.5)
WBC # FLD AUTO: 13.91 K/UL — HIGH (ref 3.8–10.5)

## 2022-07-23 PROCEDURE — 99232 SBSQ HOSP IP/OBS MODERATE 35: CPT

## 2022-07-23 RX ORDER — SENNA PLUS 8.6 MG/1
2 TABLET ORAL ONCE
Refills: 0 | Status: COMPLETED | OUTPATIENT
Start: 2022-07-23 | End: 2022-07-23

## 2022-07-23 RX ADMIN — APIXABAN 2.5 MILLIGRAM(S): 2.5 TABLET, FILM COATED ORAL at 18:01

## 2022-07-23 RX ADMIN — Medication 200 MILLIGRAM(S): at 12:11

## 2022-07-23 RX ADMIN — CEFTRIAXONE 100 MILLIGRAM(S): 500 INJECTION, POWDER, FOR SOLUTION INTRAMUSCULAR; INTRAVENOUS at 12:00

## 2022-07-23 RX ADMIN — Medication 2 TABLET(S): at 21:09

## 2022-07-23 RX ADMIN — Medication 25 MILLIGRAM(S): at 21:09

## 2022-07-23 RX ADMIN — POLYETHYLENE GLYCOL 3350 17 GRAM(S): 17 POWDER, FOR SOLUTION ORAL at 12:01

## 2022-07-23 RX ADMIN — Medication 3 MILLILITER(S): at 05:33

## 2022-07-23 RX ADMIN — APIXABAN 2.5 MILLIGRAM(S): 2.5 TABLET, FILM COATED ORAL at 05:32

## 2022-07-23 RX ADMIN — SENNA PLUS 2 TABLET(S): 8.6 TABLET ORAL at 18:01

## 2022-07-23 RX ADMIN — Medication 25 MILLIGRAM(S): at 12:00

## 2022-07-23 RX ADMIN — Medication 3 MILLILITER(S): at 16:58

## 2022-07-23 NOTE — PROGRESS NOTE ADULT - ASSESSMENT
89F w/ hx of HTN, IBS, osteoporosis, anemia, recent admission for R shoulder dislocation and Hill-Sachs lesion/ fracture due to fall and discharged to rehab facility, presents from rehab facility for sepsis secondary to UTI which are in the setting of anemia and LAIM. Found to have E coli in growing on urine and blood cultures on 2 g IV Ceftriaxone and clinical signs of infection are currently improving, course complicated by new onset Afib w/ RVR, currently on lopressor 25 mg BID with HR now predominantly in the 60-80's. Found to have severe aortic stenosis w/ preserved EF on TTE (7/21) Structural heart team consulted and subsequent workup pending family discussion.  89F w/ hx of HTN, IBS, osteoporosis, anemia, recent admission for R shoulder dislocation and Hill-Sachs lesion/ fracture due to fall and discharged to rehab facility, presents from rehab facility for sepsis secondary to UTI which are in the setting of anemia and LIAM. Found to have E coli in urine and blood cultures on 2 g IV Ceftriaxone and clinical signs of infection are currently improving, course complicated by new onset Afib w/ RVR, currently on lopressor 25 mg BID with HR now predominantly in the 60-80's. Found to have severe aortic stenosis w/ preserved EF on TTE (7/21) Structural heart team consulted and subsequent workup pending family discussion.

## 2022-07-23 NOTE — PROGRESS NOTE ADULT - PROBLEM SELECTOR PLAN 2
- on Eliquis 2.5 mg BID (started 7/22)   -likely provoked by sepsis, hypovolemia improved s/p 5IV lopressor x3, 500cc bolus  -will continue 25mg PO lopressor BID with hold parameters  -CHADVASC  4points, 4.8%, HASBLED 2 points, 4.1%. Risk benefit discussion with patient's son and daughter in law at bedside.  -HD stable, monitor VS q4 hours. - on Eliquis 2.5 mg BID (started 7/22), Attending Dr. Diaz discussed risks/benefits of anticoagulation with family and family agrees.   -likely provoked by sepsis, hypovolemia improved s/p 5IV lopressor x3, 500cc bolus  -will continue 25mg PO lopressor BID with hold parameters  -CHADVASC  4points, 4.8%, HASBLED 2 points, 4.1%. Risk benefit discussion with patient's son and daughter in law at bedside.  -HD stable, monitor VS q4 hours. - on Eliquis 2.5 mg BID (started 7/22), Attending Dr. Diaz discussed risks/benefits of anticoagulation with family and family agrees.   -will continue 25mg PO lopressor BID with hold parameters  -CHADVASC  4points, 4.8%, HASBLED 2 points, 4.1%. Risk benefit discussion with patient's son and daughter in law at bedside.  -HD stable

## 2022-07-23 NOTE — PROGRESS NOTE ADULT - ATTENDING COMMENTS
# sepsis 2/2 UTI, GNR bacteremia  # acute hypoxic respiratory failure  # new onset Afib with RVR  # acute anemia  # LIAM    - no previous diagnosis of Afib, likely in setting of sepsis; [id number] 4 but elevated HASBLED score as well, also with frequent falls  - anticoagulate w/ eliquis 2.5 BID  - TTE: severe aortic stenosis, normal LV systolic function, normal RV size, function   - cardiology consulted: care discussed with Dr. Wharton   - leukocytosis trending down, D-dimer trending down as well; VQ scan low probability for PE  - BCx with E.Coli, sensitive to ceftriaxone  - s/p 1 unit of pRBC transfusion with appropriate response; trend H/H and transfuse as needed for goal hgb >7; CTAP negative for RPB  - LIAM likely in setting of volume loss as well as acute infection  - CT chest without signs of PNA  - PT recommends EVA

## 2022-07-23 NOTE — PROGRESS NOTE ADULT - PROBLEM SELECTOR PLAN 1
Fever, leukocytosis (WBC of 12.8), hypotension, tachycardia, Lactate of 1.7 on admission to 1.2 on 7/22.   - Blood culture and urine cultures positive for E. coli  - on IV ceftriaxone 2g. adjusted to current regimen on 7/22, goal for 7 days of therapy.   - Bcx on 7/22 NGTD - preliminary.   - trend cbc, monitor vital signs for infection, monitor temperature curve  - elevated d-dimer down trending, (1700 -->1100), V/Q scan negative

## 2022-07-23 NOTE — PROGRESS NOTE ADULT - SUBJECTIVE AND OBJECTIVE BOX
**************************************  Nik Mackay, PGY-1  After 7PM, please contact night float at #[phone] or [phone]  **************************************    INTERVAL HPI/OVERNIGHT EVENTS:  Patient was seen and examined at bedside. As per nurse and patient, no o/n events, patient resting comfortably. No complaints at this time. Patient denies: fever, chills, dizziness, weakness, HA, Changes in vision, CP, palpitations, SOB, cough, N/V/D/C, dysuria, changes in bowel movements, LE edema. ROS otherwise negative.    VITAL SIGNS:  T(F): 98.9 (07-23-22 @ 04:49)  HR: 68 (07-23-22 @ 04:49)  BP: 154/84 (07-23-22 @ 04:49)  RR: 18 (07-23-22 @ 04:49)  SpO2: 96% (07-23-22 @ 04:49)  Wt(kg): --    PHYSICAL EXAM:    Constitutional: WDWN, NAD  HEENT: PERRL, EOMI, sclera non-icteric, neck supple, trachea midline, no masses, no JVD, MMM, good dentition  Respiratory: CTA b/l, good air entry b/l, no wheezing, no rhonchi, no rales, without accessory muscle use and no intercostal retractions  Cardiovascular: RRR, Systolic murmur appreciated on L 2nd intercostal space.   Gastrointestinal: soft, NTND, no masses palpable, BS normal  Extremities: Warm, well perfused, pulses equal bilateral upper and lower extremities, no edema, no clubbing. Capillary refill <2 sec  Neurological: AAOx3, CN Grossly intact  Skin: Normal temperature, warm, dry    MEDICATIONS  (STANDING):  albuterol/ipratropium for Nebulization 3 milliLiter(s) Nebulizer every 6 hours  apixaban 2.5 milliGRAM(s) Oral every 12 hours  calcium carbonate    500 mG (Tums) Chewable 2 Tablet(s) Chew at bedtime  cefTRIAXone   IVPB      cefTRIAXone   IVPB 2000 milliGRAM(s) IV Intermittent every 24 hours  lidocaine   4% Patch 1 Patch Transdermal every 24 hours  metoprolol tartrate 25 milliGRAM(s) Oral two times a day  polyethylene glycol 3350 17 Gram(s) Oral daily    MEDICATIONS  (PRN):  acetaminophen     Tablet .. 650 milliGRAM(s) Oral every 6 hours PRN Mild Pain (1 - 3), Moderate Pain (4 - 6)  guaiFENesin Oral Liquid (Sugar-Free) 200 milliGRAM(s) Oral every 6 hours PRN Cough      Allergies    No Known Allergies    Intolerances        LABS:                        8.6    11.46 )-----------( 247      ( 22 Jul 2022 07:07 )             26.0     07-22    130<L>  |  98  |  29<H>  ----------------------------<  100<H>  5.2   |  19<L>  |  1.43<H>    Ca    9.0      22 Jul 2022 06:52  Phos  3.7     07-22  Mg     2.3     07-22    TPro  6.4  /  Alb  2.6<L>  /  TBili  0.5  /  DBili  x   /  AST  50<H>  /  ALT  58<H>  /  AlkPhos  129<H>  07-22    PT/INR - ( 21 Jul 2022 07:33 )   PT: 12.5 sec;   INR: 1.08 ratio         PTT - ( 21 Jul 2022 07:33 )  PTT:24.1 sec      RADIOLOGY & ADDITIONAL TESTS:  Reviewed **************************************  Nik Mackay, PGY-1  After 7PM, please contact night float at #[phone] or [phone]  **************************************    INTERVAL HPI/OVERNIGHT EVENTS:  Bladder scan x2 overnight 130 mL, straight cath added in AM. As per nurse and patient, no o/n events, patient resting comfortably. No complaints at this time. Patient denies: fever, chills, dizziness, weakness, HA, Changes in vision, CP, palpitations, SOB, cough, N/V/D/C, dysuria, changes in bowel movements, LE edema. ROS otherwise negative.    VITAL SIGNS:  T(F): 98.9 (07-23-22 @ 04:49)  HR: 68 (07-23-22 @ 04:49)  BP: 154/84 (07-23-22 @ 04:49)  RR: 18 (07-23-22 @ 04:49)  SpO2: 96% (07-23-22 @ 04:49)  Wt(kg): --    PHYSICAL EXAM:    Constitutional: WDWN, NAD  HEENT: PERRL, EOMI, sclera non-icteric, neck supple, trachea midline, no masses, no JVD, MMM, good dentition  Respiratory: CTA b/l, good air entry b/l, no wheezing, no rhonchi, no rales, without accessory muscle use and no intercostal retractions  Cardiovascular: RRR, Systolic murmur appreciated on L 2nd intercostal space.   Gastrointestinal: soft, NTND, no masses palpable, BS normal  Extremities: Warm, well perfused, pulses equal bilateral upper and lower extremities, no edema, no clubbing. Capillary refill <2 sec  Neurological: AAOx3, CN Grossly intact  Skin: Normal temperature, warm, dry    MEDICATIONS  (STANDING):  albuterol/ipratropium for Nebulization 3 milliLiter(s) Nebulizer every 6 hours  apixaban 2.5 milliGRAM(s) Oral every 12 hours  calcium carbonate    500 mG (Tums) Chewable 2 Tablet(s) Chew at bedtime  cefTRIAXone   IVPB      cefTRIAXone   IVPB 2000 milliGRAM(s) IV Intermittent every 24 hours  lidocaine   4% Patch 1 Patch Transdermal every 24 hours  metoprolol tartrate 25 milliGRAM(s) Oral two times a day  polyethylene glycol 3350 17 Gram(s) Oral daily    MEDICATIONS  (PRN):  acetaminophen     Tablet .. 650 milliGRAM(s) Oral every 6 hours PRN Mild Pain (1 - 3), Moderate Pain (4 - 6)  guaiFENesin Oral Liquid (Sugar-Free) 200 milliGRAM(s) Oral every 6 hours PRN Cough      Allergies    No Known Allergies    Intolerances        LABS:                        8.6    11.46 )-----------( 247      ( 22 Jul 2022 07:07 )             26.0     07-22    130<L>  |  98  |  29<H>  ----------------------------<  100<H>  5.2   |  19<L>  |  1.43<H>    Ca    9.0      22 Jul 2022 06:52  Phos  3.7     07-22  Mg     2.3     07-22    TPro  6.4  /  Alb  2.6<L>  /  TBili  0.5  /  DBili  x   /  AST  50<H>  /  ALT  58<H>  /  AlkPhos  129<H>  07-22    PT/INR - ( 21 Jul 2022 07:33 )   PT: 12.5 sec;   INR: 1.08 ratio         PTT - ( 21 Jul 2022 07:33 )  PTT:24.1 sec      RADIOLOGY & ADDITIONAL TESTS:  Reviewed **************************************  Nik Mackay, PGY-1  After 7PM, please contact night float at #[phone] or #[phone]  **************************************    INTERVAL HPI/OVERNIGHT EVENTS:  Bladder scan x2 overnight 130 mL, straight cath added in AM. As per nurse and patient, no o/n events, patient resting comfortably. No complaints at this time. Patient denies: chest pain/shortness of breath/abdominal pain  VITAL SIGNS:  T(F): 98.9 (07-23-22 @ 04:49)  HR: 68 (07-23-22 @ 04:49)  BP: 154/84 (07-23-22 @ 04:49)  RR: 18 (07-23-22 @ 04:49)  SpO2: 96% (07-23-22 @ 04:49)  Wt(kg): --    PHYSICAL EXAM:    Constitutional: WDWN, NAD  Respiratory: CTA b/l, good air entry b/l, no wheezing, no rhonchi, no rales, without accessory muscle use and no intercostal retractions  Cardiovascular: RRR, Systolic murmur appreciated on L 2nd intercostal space.   Gastrointestinal: soft, NTND, no masses palpable, BS normal  Extremities: Warm, well perfused,  no edema  Neurological: AAOx3, symmetric face      MEDICATIONS  (STANDING):  albuterol/ipratropium for Nebulization 3 milliLiter(s) Nebulizer every 6 hours  apixaban 2.5 milliGRAM(s) Oral every 12 hours  calcium carbonate    500 mG (Tums) Chewable 2 Tablet(s) Chew at bedtime  cefTRIAXone   IVPB      cefTRIAXone   IVPB 2000 milliGRAM(s) IV Intermittent every 24 hours  lidocaine   4% Patch 1 Patch Transdermal every 24 hours  metoprolol tartrate 25 milliGRAM(s) Oral two times a day  polyethylene glycol 3350 17 Gram(s) Oral daily    MEDICATIONS  (PRN):  acetaminophen     Tablet .. 650 milliGRAM(s) Oral every 6 hours PRN Mild Pain (1 - 3), Moderate Pain (4 - 6)  guaiFENesin Oral Liquid (Sugar-Free) 200 milliGRAM(s) Oral every 6 hours PRN Cough      Allergies    No Known Allergies    Intolerances        LABS:                        8.6    11.46 )-----------( 247      ( 22 Jul 2022 07:07 )             26.0     07-22    130<L>  |  98  |  29<H>  ----------------------------<  100<H>  5.2   |  19<L>  |  1.43<H>    Ca    9.0      22 Jul 2022 06:52  Phos  3.7     07-22  Mg     2.3     07-22    TPro  6.4  /  Alb  2.6<L>  /  TBili  0.5  /  DBili  x   /  AST  50<H>  /  ALT  58<H>  /  AlkPhos  129<H>  07-22    PT/INR - ( 21 Jul 2022 07:33 )   PT: 12.5 sec;   INR: 1.08 ratio         PTT - ( 21 Jul 2022 07:33 )  PTT:24.1 sec      RADIOLOGY & ADDITIONAL TESTS:  < from: Transthoracic Echocardiogram (07.21.22 @ 17:08) >  Conclusions:  1. Mitral annular calcification and calcified mitral  leaflets with decreased diastolic opening. Mild mitral  regurgitation.  Peak mitral valve gradient equals 5 mm Hg,  mean transmitral valve gradient equals 3 mm Hg, consistent  with mild mitral stenosis. Gradients measured at a HR of  80bpm.  2. Calcified trileaflet aortic valve with decreased  opening. Peak transaortic valve gradient equals 36 mm Hg,  mean transaortic valve gradient equals 20 mm Hg, estimated  aortic valve area equals 0.7 sqcm (by continuity equation),  aortic valve velocity time integral equals 69 cm,  consistent with severe aortic stenosis. Mild aortic  regurgitation.  3. Normal left ventricular systolic function. No segmental  wall motion abnormalities.  4. Normal right ventricular size and function.  5. Estimated pulmonary artery systolic pressure equals 44  mm Hg, assuming right atrial pressure equals 8 mm Hg,  consistent with mild pulmonary pressures.  *** No previous Echo exam.    < end of copied text >

## 2022-07-23 NOTE — PROGRESS NOTE ADULT - PROBLEM SELECTOR PLAN 8
Pt currently AOX4   Confusion reported by son on presentation, baseline A&Ox4  Likely due to sepsis and/or hypoxia.   - hold off on CT head unless focal deficits or persistent confusion Resolved  Pt currently AOX4   Confusion reported by son on presentation, baseline A&Ox4  Likely due to sepsis and/or hypoxia.

## 2022-07-23 NOTE — PROGRESS NOTE ADULT - PROBLEM SELECTOR PLAN 6
Baseline SCr ~1-1.2. Of note patient with another MRN in EMR.   SCr on admission 1.7  Likely due to sepsis or hypovolemia from sepsis  - trend SCr   - dose meds per gfr  - avoid nephrotoxins  - urine lytes and physical exam evident of dehydration/poor oral intake. Oral intake encouraged with patient.

## 2022-07-23 NOTE — PROGRESS NOTE ADULT - PROBLEM SELECTOR PLAN 5
- Severe aortic stenosis w/ preserved EF found on TTE 7/21  - Structural heart team consulted  - workup to include Cardiac CT and cardiac catheterization, workup pending family discussion at this time.

## 2022-07-24 LAB
ALBUMIN SERPL ELPH-MCNC: 3 G/DL — LOW (ref 3.3–5)
ALP SERPL-CCNC: 127 U/L — HIGH (ref 40–120)
ALT FLD-CCNC: 55 U/L — HIGH (ref 10–45)
ANION GAP SERPL CALC-SCNC: 12 MMOL/L — SIGNIFICANT CHANGE UP (ref 5–17)
APTT BLD: 28.9 SEC — SIGNIFICANT CHANGE UP (ref 27.5–35.5)
AST SERPL-CCNC: 35 U/L — SIGNIFICANT CHANGE UP (ref 10–40)
BILIRUB SERPL-MCNC: 0.2 MG/DL — SIGNIFICANT CHANGE UP (ref 0.2–1.2)
BUN SERPL-MCNC: 24 MG/DL — HIGH (ref 7–23)
CALCIUM SERPL-MCNC: 9.9 MG/DL — SIGNIFICANT CHANGE UP (ref 8.4–10.5)
CHLORIDE SERPL-SCNC: 99 MMOL/L — SIGNIFICANT CHANGE UP (ref 96–108)
CO2 SERPL-SCNC: 24 MMOL/L — SIGNIFICANT CHANGE UP (ref 22–31)
CREAT SERPL-MCNC: 1.52 MG/DL — HIGH (ref 0.5–1.3)
EGFR: 32 ML/MIN/1.73M2 — LOW
GLUCOSE SERPL-MCNC: 119 MG/DL — HIGH (ref 70–99)
HCT VFR BLD CALC: 28.1 % — LOW (ref 34.5–45)
HGB BLD-MCNC: 9.3 G/DL — LOW (ref 11.5–15.5)
INR BLD: 1.34 RATIO — HIGH (ref 0.88–1.16)
MAGNESIUM SERPL-MCNC: 2.1 MG/DL — SIGNIFICANT CHANGE UP (ref 1.6–2.6)
MCHC RBC-ENTMCNC: 30.4 PG — SIGNIFICANT CHANGE UP (ref 27–34)
MCHC RBC-ENTMCNC: 33.1 GM/DL — SIGNIFICANT CHANGE UP (ref 32–36)
MCV RBC AUTO: 91.8 FL — SIGNIFICANT CHANGE UP (ref 80–100)
NRBC # BLD: 0 /100 WBCS — SIGNIFICANT CHANGE UP (ref 0–0)
PHOSPHATE SERPL-MCNC: 4.9 MG/DL — HIGH (ref 2.5–4.5)
PLATELET # BLD AUTO: 340 K/UL — SIGNIFICANT CHANGE UP (ref 150–400)
POTASSIUM SERPL-MCNC: 5.3 MMOL/L — SIGNIFICANT CHANGE UP (ref 3.5–5.3)
POTASSIUM SERPL-SCNC: 5.3 MMOL/L — SIGNIFICANT CHANGE UP (ref 3.5–5.3)
PROT SERPL-MCNC: 6.7 G/DL — SIGNIFICANT CHANGE UP (ref 6–8.3)
PROTHROM AB SERPL-ACNC: 15.4 SEC — HIGH (ref 10.5–13.4)
RBC # BLD: 3.06 M/UL — LOW (ref 3.8–5.2)
RBC # FLD: 14.7 % — HIGH (ref 10.3–14.5)
SODIUM SERPL-SCNC: 135 MMOL/L — SIGNIFICANT CHANGE UP (ref 135–145)
WBC # BLD: 15.07 K/UL — HIGH (ref 3.8–10.5)
WBC # FLD AUTO: 15.07 K/UL — HIGH (ref 3.8–10.5)

## 2022-07-24 PROCEDURE — 99232 SBSQ HOSP IP/OBS MODERATE 35: CPT

## 2022-07-24 RX ORDER — CEFTRIAXONE 500 MG/1
2000 INJECTION, POWDER, FOR SOLUTION INTRAMUSCULAR; INTRAVENOUS EVERY 24 HOURS
Refills: 0 | Status: COMPLETED | OUTPATIENT
Start: 2022-07-25 | End: 2022-07-27

## 2022-07-24 RX ORDER — CEFTRIAXONE 500 MG/1
2000 INJECTION, POWDER, FOR SOLUTION INTRAMUSCULAR; INTRAVENOUS EVERY 24 HOURS
Refills: 0 | Status: DISCONTINUED | OUTPATIENT
Start: 2022-07-24 | End: 2022-07-24

## 2022-07-24 RX ADMIN — APIXABAN 2.5 MILLIGRAM(S): 2.5 TABLET, FILM COATED ORAL at 05:37

## 2022-07-24 RX ADMIN — LIDOCAINE 1 PATCH: 4 CREAM TOPICAL at 07:47

## 2022-07-24 RX ADMIN — Medication 650 MILLIGRAM(S): at 05:38

## 2022-07-24 RX ADMIN — Medication 3 MILLILITER(S): at 11:55

## 2022-07-24 RX ADMIN — POLYETHYLENE GLYCOL 3350 17 GRAM(S): 17 POWDER, FOR SOLUTION ORAL at 11:55

## 2022-07-24 RX ADMIN — Medication 2 TABLET(S): at 21:52

## 2022-07-24 RX ADMIN — CEFTRIAXONE 100 MILLIGRAM(S): 500 INJECTION, POWDER, FOR SOLUTION INTRAMUSCULAR; INTRAVENOUS at 10:47

## 2022-07-24 RX ADMIN — APIXABAN 2.5 MILLIGRAM(S): 2.5 TABLET, FILM COATED ORAL at 17:18

## 2022-07-24 RX ADMIN — Medication 25 MILLIGRAM(S): at 10:47

## 2022-07-24 RX ADMIN — Medication 3 MILLILITER(S): at 17:18

## 2022-07-24 RX ADMIN — Medication 25 MILLIGRAM(S): at 21:53

## 2022-07-24 RX ADMIN — LIDOCAINE 1 PATCH: 4 CREAM TOPICAL at 18:06

## 2022-07-24 RX ADMIN — LIDOCAINE 1 PATCH: 4 CREAM TOPICAL at 05:37

## 2022-07-24 NOTE — PROGRESS NOTE ADULT - PROBLEM SELECTOR PLAN 1
Fever, leukocytosis (WBC of 12.8), hypotension, tachycardia, Lactate of 1.7 on admission to 1.2 on 7/22.   - Blood culture and urine cultures positive for E. coli  - on IV ceftriaxone 2g. adjusted to current regimen on 7/22, goal for 7 days of therapy.   - Bcx on 7/22 NGTD - preliminary.   - trend cbc, monitor vital signs for infection, monitor temperature curve  - elevated d-dimer down trending, (1700 -->1100), V/Q scan negative Fever, leukocytosis (WBC of 12.8), hypotension, tachycardia, Lactate of 1.7 on admission to 1.2 on 7/22.   - Blood culture and urine cultures positive for E. coli  - on IV ceftriaxone 2g. adjusted to current regimen on 7/22, goal for 7 days of therapy, last day of therapy is 7/28  - f/u Bcx for final results.   - trend cbc, monitor vital signs for infection, monitor temperature curve  - elevated d-dimer down trending, (1700 -->1100), V/Q scan negative

## 2022-07-24 NOTE — PROGRESS NOTE ADULT - PROBLEM SELECTOR PLAN 2
- on Eliquis 2.5 mg BID (started 7/22), Attending Dr. Diaz discussed risks/benefits of anticoagulation with family and family agrees.   -will continue 25mg PO lopressor BID with hold parameters  -CHADVASC  4points, 4.8%, HASBLED 2 points, 4.1%. Risk benefit discussion with patient's son and daughter in law at bedside.  -HD stable

## 2022-07-24 NOTE — PROGRESS NOTE ADULT - PROBLEM SELECTOR PLAN 4
Suprapubic pain, leukocytosis.   UA with LE and bacteria  - Urine culture and Bcx positive for E. coli  - s/p zosyn, narrowed to ceftriaxone 7/21 based on sensitivities Suprapubic pain, leukocytosis on presentation.   UA with LE and bacteria  - Urine culture and Bcx positive for E. coli  - s/p zosyn, narrowed to ceftriaxone 7/21 based on sensitivities  - last day of ceftriaxone therapy on 7/28 for concomittant bacteremia.

## 2022-07-24 NOTE — PROGRESS NOTE ADULT - PROBLEM SELECTOR PLAN 5
- Severe aortic stenosis w/ preserved EF found on TTE 7/21  - Structural heart team consulted  - workup to include Cardiac CT and cardiac catheterization, workup pending family discussion at this time. - Severe aortic stenosis w/ preserved EF found on TTE 7/21  - Structural heart team consulted  - workup to include Cardiac CT and cardiac catheterization, workup pending family discussion at this time.  - can f/u outpatient.

## 2022-07-24 NOTE — PROGRESS NOTE ADULT - PROBLEM SELECTOR PLAN 8
Resolved  Pt currently AOX4   Confusion reported by son on presentation, baseline A&Ox4  Likely due to sepsis and/or hypoxia.

## 2022-07-24 NOTE — PROGRESS NOTE ADULT - ATTENDING COMMENTS
Above plan discussed w/ Dr. Mackay     # sepsis 2/2 UTI, GNR bacteremia  # acute hypoxic respiratory failure  # new onset Afib with RVR  # acute anemia  # LIAM    - no previous diagnosis of Afib, likely in setting of sepsis; [id number] 4 but elevated HASBLED score as well, also with frequent falls  - anticoagulate w/ eliquis 2.5 BID  - TTE: severe aortic stenosis, normal LV systolic function, normal RV size, function   - cardiology consulted: care discussed with Dr. Wharton   - leukocytosis trending down, D-dimer trending down as well; VQ scan low probability for PE  - BCx with E.Coli, sensitive to ceftriaxone  - s/p 1 unit of pRBC transfusion with appropriate response; trend H/H and transfuse as needed for goal hgb >7; CTAP negative for RPB  - LIAM likely in setting of volume loss as well as acute infection  - CT chest without signs of PNA  - PT recommends EVA .

## 2022-07-24 NOTE — PROGRESS NOTE ADULT - ASSESSMENT
89F w/ hx of HTN, IBS, osteoporosis, anemia, recent admission for R shoulder dislocation and Hill-Sachs lesion/ fracture due to fall and discharged to rehab facility, presents from rehab facility for sepsis secondary to UTI which are in the setting of anemia and LIAM. Found to have E coli in urine and blood cultures on 2 g IV Ceftriaxone and clinical signs of infection are currently improving, course complicated by new onset Afib w/ RVR, currently on lopressor 25 mg BID with HR now predominantly in the 60-80's. Found to have severe aortic stenosis w/ preserved EF on TTE (7/21) Structural heart team consulted and subsequent workup pending family discussion.

## 2022-07-24 NOTE — PROGRESS NOTE ADULT - SUBJECTIVE AND OBJECTIVE BOX
**************************************  Nik Mackay, PGY-1  After 7PM, please contact night float at #[phone] or [phone]  **************************************    INTERVAL HPI/OVERNIGHT EVENTS:  Patient was seen and examined at bedside. As per nurse and patient, no o/n events, patient resting comfortably. No complaints at this time. Patient denies: fever, chills, dizziness, weakness, HA, Changes in vision, CP, palpitations, SOB, cough, N/V/D/C, dysuria, changes in bowel movements, LE edema. ROS otherwise negative.    VITAL SIGNS:  T(F): 99.2 (07-24-22 @ 05:11)  HR: 74 (07-24-22 @ 05:11)  BP: 153/96 (07-24-22 @ 05:11)  RR: 18 (07-24-22 @ 05:11)  SpO2: 97% (07-24-22 @ 05:11)  Wt(kg): --    PHYSICAL EXAM:    Constitutional: WDWN, NAD  HEENT: PERRL, EOMI, sclera non-icteric, neck supple, trachea midline, no masses, no JVD, MMM, good dentition  Respiratory: CTA b/l, good air entry b/l, no wheezing, no rhonchi, no rales, without accessory muscle use and no intercostal retractions  Cardiovascular: RRR, normal S1S2, no M/R/G  Gastrointestinal: soft, NTND, no masses palpable, BS normal  Extremities: Warm, well perfused, pulses equal bilateral upper and lower extremities, no edema, no clubbing. Capillary refill <2 sec  Neurological: AAOx3, CN Grossly intact  Skin: Normal temperature, warm, dry    MEDICATIONS  (STANDING):  albuterol/ipratropium for Nebulization 3 milliLiter(s) Nebulizer every 6 hours  apixaban 2.5 milliGRAM(s) Oral every 12 hours  calcium carbonate    500 mG (Tums) Chewable 2 Tablet(s) Chew at bedtime  cefTRIAXone   IVPB      cefTRIAXone   IVPB 2000 milliGRAM(s) IV Intermittent every 24 hours  lidocaine   4% Patch 1 Patch Transdermal every 24 hours  metoprolol tartrate 25 milliGRAM(s) Oral two times a day  polyethylene glycol 3350 17 Gram(s) Oral daily    MEDICATIONS  (PRN):  acetaminophen     Tablet .. 650 milliGRAM(s) Oral every 6 hours PRN Mild Pain (1 - 3), Moderate Pain (4 - 6)  guaiFENesin Oral Liquid (Sugar-Free) 200 milliGRAM(s) Oral every 6 hours PRN Cough      Allergies    No Known Allergies    Intolerances        LABS:                        9.5    13.91 )-----------( 313      ( 23 Jul 2022 07:24 )             29.1     07-23    134<L>  |  98  |  26<H>  ----------------------------<  115<H>  5.1   |  21<L>  |  1.49<H>    Ca    9.5      23 Jul 2022 07:07    TPro  6.7  /  Alb  2.9<L>  /  TBili  0.3  /  DBili  x   /  AST  41<H>  /  ALT  59<H>  /  AlkPhos  139<H>  07-23    PT/INR - ( 23 Jul 2022 07:48 )   PT: 13.4 sec;   INR: 1.15 ratio         PTT - ( 23 Jul 2022 07:48 )  PTT:27.0 sec      RADIOLOGY & ADDITIONAL TESTS:  Reviewed **************************************  Nik Mackay, PGY-1  After 7PM, please contact night float at #[phone] or [phone]  **************************************    INTERVAL HPI/OVERNIGHT EVENTS:  Patient was seen and examined at bedside. As per nurse and patient, no o/n events, patient resting comfortably. Pt denies fevers, chills, chest pain, abdominal pain, shortness of breath, difficulty urinating.     VITAL SIGNS:  T(F): 98.5 (07-24-22 @ 13:03)  HR: 67 (07-24-22 @ 13:03)  BP: 147/74 (07-24-22 @ 13:03)  RR: 18 (07-24-22 @ 13:03)  SpO2: 95% (07-24-22 @ 13:03)  Wt(kg): --    PHYSICAL EXAM:    Constitutional: WDWN, NAD  HEENT: NC/AT, MMM  Respiratory: CTA b/l, good air entry b/l, no wheezing, no rhonchi, no rales, without accessory muscle use and no intercostal retractions  Cardiovascular: RRR, normal S1S2, no M/R/G  Gastrointestinal: soft, NTND, no masses palpable,   Extremities: No edema appreciated on the b/l lower extremities.   Neurological: AAOx3, CN Grossly intact  Skin: Normal temperature, warm, dry, diffuse senile ecchymosis appreciated on b/l upper extremities.     MEDICATIONS  (STANDING):  albuterol/ipratropium for Nebulization 3 milliLiter(s) Nebulizer every 6 hours  apixaban 2.5 milliGRAM(s) Oral every 12 hours  calcium carbonate    500 mG (Tums) Chewable 2 Tablet(s) Chew at bedtime  lidocaine   4% Patch 1 Patch Transdermal every 24 hours  metoprolol tartrate 25 milliGRAM(s) Oral two times a day  polyethylene glycol 3350 17 Gram(s) Oral daily    MEDICATIONS  (PRN):  acetaminophen     Tablet .. 650 milliGRAM(s) Oral every 6 hours PRN Mild Pain (1 - 3), Moderate Pain (4 - 6)  guaiFENesin Oral Liquid (Sugar-Free) 200 milliGRAM(s) Oral every 6 hours PRN Cough      Allergies    No Known Allergies    Intolerances        LABS:                        9.3    15.07 )-----------( 340      ( 24 Jul 2022 07:30 )             28.1     07-24    135  |  99  |  24<H>  ----------------------------<  119<H>  5.3   |  24  |  1.52<H>    Ca    9.9      24 Jul 2022 07:28  Phos  4.9     07-24  Mg     2.1     07-24    TPro  6.7  /  Alb  3.0<L>  /  TBili  0.2  /  DBili  x   /  AST  35  /  ALT  55<H>  /  AlkPhos  127<H>  07-24    PT/INR - ( 24 Jul 2022 07:30 )   PT: 15.4 sec;   INR: 1.34 ratio         PTT - ( 24 Jul 2022 07:30 )  PTT:28.9 sec      RADIOLOGY & ADDITIONAL TESTS:  Reviewed **************************************  Nik Mackay, PGY-1  After 7PM, please contact night float at #[phone] or [phone]  **************************************    INTERVAL HPI/OVERNIGHT EVENTS:  Patient was seen and examined at bedside. As per nurse and patient, no o/n events, patient resting comfortably. Pt denies fevers, chills, chest pain, abdominal pain, shortness of breath, difficulty urinating.     VITAL SIGNS:  T(F): 98.5 (07-24-22 @ 13:03)  HR: 67 (07-24-22 @ 13:03)  BP: 147/74 (07-24-22 @ 13:03)  RR: 18 (07-24-22 @ 13:03)  SpO2: 95% (07-24-22 @ 13:03)  Wt(kg): --    PHYSICAL EXAM:    Constitutional: WDWN, NAD  HEENT: NC/AT, MMM  Respiratory: CTA b/l, good air entry b/l, no wheezing, no rhonchi, no rales, without accessory muscle use and no intercostal retractions  Cardiovascular: RRR, normal S1S2, no M/R/G  Gastrointestinal: soft, NTND, no masses palpable,   Extremities: No edema appreciated on the b/l lower extremities.   Neurological: AAOx3,  Skin: Normal temperature, warm, dry, diffuse senile ecchymosis appreciated on b/l upper extremities.     MEDICATIONS  (STANDING):  albuterol/ipratropium for Nebulization 3 milliLiter(s) Nebulizer every 6 hours  apixaban 2.5 milliGRAM(s) Oral every 12 hours  calcium carbonate    500 mG (Tums) Chewable 2 Tablet(s) Chew at bedtime  lidocaine   4% Patch 1 Patch Transdermal every 24 hours  metoprolol tartrate 25 milliGRAM(s) Oral two times a day  polyethylene glycol 3350 17 Gram(s) Oral daily    MEDICATIONS  (PRN):  acetaminophen     Tablet .. 650 milliGRAM(s) Oral every 6 hours PRN Mild Pain (1 - 3), Moderate Pain (4 - 6)  guaiFENesin Oral Liquid (Sugar-Free) 200 milliGRAM(s) Oral every 6 hours PRN Cough      Allergies    No Known Allergies    Intolerances        LABS:                        9.3    15.07 )-----------( 340      ( 24 Jul 2022 07:30 )             28.1     07-24    135  |  99  |  24<H>  ----------------------------<  119<H>  5.3   |  24  |  1.52<H>    Ca    9.9      24 Jul 2022 07:28  Phos  4.9     07-24  Mg     2.1     07-24    TPro  6.7  /  Alb  3.0<L>  /  TBili  0.2  /  DBili  x   /  AST  35  /  ALT  55<H>  /  AlkPhos  127<H>  07-24    PT/INR - ( 24 Jul 2022 07:30 )   PT: 15.4 sec;   INR: 1.34 ratio         PTT - ( 24 Jul 2022 07:30 )  PTT:28.9 sec      RADIOLOGY & ADDITIONAL TESTS:  Reviewed

## 2022-07-25 LAB
ALBUMIN SERPL ELPH-MCNC: 2.9 G/DL — LOW (ref 3.3–5)
ALP SERPL-CCNC: 106 U/L — SIGNIFICANT CHANGE UP (ref 40–120)
ALT FLD-CCNC: 50 U/L — HIGH (ref 10–45)
ANION GAP SERPL CALC-SCNC: 10 MMOL/L — SIGNIFICANT CHANGE UP (ref 5–17)
AST SERPL-CCNC: 29 U/L — SIGNIFICANT CHANGE UP (ref 10–40)
BASOPHILS # BLD AUTO: 0.09 K/UL — SIGNIFICANT CHANGE UP (ref 0–0.2)
BASOPHILS NFR BLD AUTO: 0.7 % — SIGNIFICANT CHANGE UP (ref 0–2)
BILIRUB SERPL-MCNC: 0.3 MG/DL — SIGNIFICANT CHANGE UP (ref 0.2–1.2)
BUN SERPL-MCNC: 20 MG/DL — SIGNIFICANT CHANGE UP (ref 7–23)
CALCIUM SERPL-MCNC: 9.6 MG/DL — SIGNIFICANT CHANGE UP (ref 8.4–10.5)
CHLORIDE SERPL-SCNC: 96 MMOL/L — SIGNIFICANT CHANGE UP (ref 96–108)
CO2 SERPL-SCNC: 26 MMOL/L — SIGNIFICANT CHANGE UP (ref 22–31)
CREAT SERPL-MCNC: 1.46 MG/DL — HIGH (ref 0.5–1.3)
CULTURE RESULTS: SIGNIFICANT CHANGE UP
CULTURE RESULTS: SIGNIFICANT CHANGE UP
EGFR: 34 ML/MIN/1.73M2 — LOW
EOSINOPHIL # BLD AUTO: 0.44 K/UL — SIGNIFICANT CHANGE UP (ref 0–0.5)
EOSINOPHIL NFR BLD AUTO: 3.6 % — SIGNIFICANT CHANGE UP (ref 0–6)
GLUCOSE SERPL-MCNC: 108 MG/DL — HIGH (ref 70–99)
HCT VFR BLD CALC: 29.8 % — LOW (ref 34.5–45)
HGB BLD-MCNC: 9.7 G/DL — LOW (ref 11.5–15.5)
IMM GRANULOCYTES NFR BLD AUTO: 6.4 % — HIGH (ref 0–1.5)
LYMPHOCYTES # BLD AUTO: 1.92 K/UL — SIGNIFICANT CHANGE UP (ref 1–3.3)
LYMPHOCYTES # BLD AUTO: 15.6 % — SIGNIFICANT CHANGE UP (ref 13–44)
MAGNESIUM SERPL-MCNC: 1.8 MG/DL — SIGNIFICANT CHANGE UP (ref 1.6–2.6)
MCHC RBC-ENTMCNC: 30.1 PG — SIGNIFICANT CHANGE UP (ref 27–34)
MCHC RBC-ENTMCNC: 32.6 GM/DL — SIGNIFICANT CHANGE UP (ref 32–36)
MCV RBC AUTO: 92.5 FL — SIGNIFICANT CHANGE UP (ref 80–100)
MONOCYTES # BLD AUTO: 0.63 K/UL — SIGNIFICANT CHANGE UP (ref 0–0.9)
MONOCYTES NFR BLD AUTO: 5.1 % — SIGNIFICANT CHANGE UP (ref 2–14)
NEUTROPHILS # BLD AUTO: 8.46 K/UL — HIGH (ref 1.8–7.4)
NEUTROPHILS NFR BLD AUTO: 68.6 % — SIGNIFICANT CHANGE UP (ref 43–77)
NRBC # BLD: 0 /100 WBCS — SIGNIFICANT CHANGE UP (ref 0–0)
PHOSPHATE SERPL-MCNC: 4.7 MG/DL — HIGH (ref 2.5–4.5)
PLATELET # BLD AUTO: 380 K/UL — SIGNIFICANT CHANGE UP (ref 150–400)
POTASSIUM SERPL-MCNC: 4.9 MMOL/L — SIGNIFICANT CHANGE UP (ref 3.5–5.3)
POTASSIUM SERPL-SCNC: 4.9 MMOL/L — SIGNIFICANT CHANGE UP (ref 3.5–5.3)
PROT SERPL-MCNC: 6.6 G/DL — SIGNIFICANT CHANGE UP (ref 6–8.3)
RBC # BLD: 3.22 M/UL — LOW (ref 3.8–5.2)
RBC # FLD: 14.4 % — SIGNIFICANT CHANGE UP (ref 10.3–14.5)
SARS-COV-2 RNA SPEC QL NAA+PROBE: SIGNIFICANT CHANGE UP
SODIUM SERPL-SCNC: 132 MMOL/L — LOW (ref 135–145)
SPECIMEN SOURCE: SIGNIFICANT CHANGE UP
SPECIMEN SOURCE: SIGNIFICANT CHANGE UP
WBC # BLD: 12.33 K/UL — HIGH (ref 3.8–10.5)
WBC # FLD AUTO: 12.33 K/UL — HIGH (ref 3.8–10.5)

## 2022-07-25 PROCEDURE — 99232 SBSQ HOSP IP/OBS MODERATE 35: CPT | Mod: GC

## 2022-07-25 RX ADMIN — APIXABAN 2.5 MILLIGRAM(S): 2.5 TABLET, FILM COATED ORAL at 06:01

## 2022-07-25 RX ADMIN — Medication 3 MILLILITER(S): at 12:45

## 2022-07-25 RX ADMIN — APIXABAN 2.5 MILLIGRAM(S): 2.5 TABLET, FILM COATED ORAL at 17:58

## 2022-07-25 RX ADMIN — Medication 25 MILLIGRAM(S): at 21:28

## 2022-07-25 RX ADMIN — Medication 3 MILLILITER(S): at 17:58

## 2022-07-25 RX ADMIN — Medication 650 MILLIGRAM(S): at 08:21

## 2022-07-25 RX ADMIN — Medication 2 TABLET(S): at 21:28

## 2022-07-25 RX ADMIN — Medication 25 MILLIGRAM(S): at 12:44

## 2022-07-25 RX ADMIN — CEFTRIAXONE 100 MILLIGRAM(S): 500 INJECTION, POWDER, FOR SOLUTION INTRAMUSCULAR; INTRAVENOUS at 12:52

## 2022-07-25 RX ADMIN — Medication 650 MILLIGRAM(S): at 09:00

## 2022-07-25 NOTE — PROGRESS NOTE ADULT - PROBLEM SELECTOR PLAN 5
- Severe aortic stenosis w/ preserved EF found on TTE 7/21  - Structural heart team consulted  - workup to include Cardiac CT and cardiac catheterization, workup pending family discussion at this time.  - can f/u outpatient. - Severe aortic stenosis w/ preserved EF found on TTE 7/21  - Structural heart team consulted  - patient's son declined workup to include Cardiac CT and cardiac catheterization  - can f/u outpatient.

## 2022-07-25 NOTE — PROGRESS NOTE ADULT - PROBLEM SELECTOR PLAN 4
Suprapubic pain, leukocytosis on presentation.   UA with LE and bacteria  - Urine culture and Bcx positive for E. coli  - s/p zosyn, narrowed to ceftriaxone 7/21 based on sensitivities  - last day of ceftriaxone therapy on 7/28 for concomittant bacteremia. Suprapubic pain, leukocytosis on presentation.   UA with LE and bacteria  - Urine culture and Bcx positive for E. coli  - s/p zosyn, narrowed to ceftriaxone 7/21 based on sensitivities  - last day of ceftriaxone therapy on 7/28 for concomitant bacteremia.

## 2022-07-25 NOTE — PROGRESS NOTE ADULT - SUBJECTIVE AND OBJECTIVE BOX
***************************************************************  Faye Luz MD (PGY-1)  Internal Medicine  Pager: [phone]  ***************************************************************    PROGRESS NOTE:     Patient is a 89y old  Female who presents with a chief complaint of Fever, cough, confusion (24 Jul 2022 07:05)      SUBJECTIVE / OVERNIGHT EVENTS: Patient seen and examined at bedside. No acute overnight events. This morning, the patient is comfortable and doing well. No acute complaints. Denies fevers, chills, N/V/D, chest pain, SOB, abdominal pain.    MEDICATIONS  (STANDING):  albuterol/ipratropium for Nebulization 3 milliLiter(s) Nebulizer every 6 hours  apixaban 2.5 milliGRAM(s) Oral every 12 hours  calcium carbonate    500 mG (Tums) Chewable 2 Tablet(s) Chew at bedtime  cefTRIAXone   IVPB 2000 milliGRAM(s) IV Intermittent every 24 hours  lidocaine   4% Patch 1 Patch Transdermal every 24 hours  metoprolol tartrate 25 milliGRAM(s) Oral two times a day  polyethylene glycol 3350 17 Gram(s) Oral daily    MEDICATIONS  (PRN):  acetaminophen     Tablet .. 650 milliGRAM(s) Oral every 6 hours PRN Mild Pain (1 - 3), Moderate Pain (4 - 6)  guaiFENesin Oral Liquid (Sugar-Free) 200 milliGRAM(s) Oral every 6 hours PRN Cough      CAPILLARY BLOOD GLUCOSE        I&O's Summary    24 Jul 2022 07:01  -  25 Jul 2022 07:00  --------------------------------------------------------  IN: 240 mL / OUT: 1100 mL / NET: -860 mL        PHYSICAL EXAM:  Vital Signs Last 24 Hrs  T(C): 37.3 (25 Jul 2022 04:29), Max: 37.4 (24 Jul 2022 20:19)  T(F): 99.2 (25 Jul 2022 04:29), Max: 99.3 (24 Jul 2022 20:19)  HR: 78 (25 Jul 2022 04:29) (67 - 84)  BP: 168/85 (25 Jul 2022 04:29) (147/74 - 168/85)  BP(mean): --  RR: 18 (25 Jul 2022 04:29) (18 - 18)  SpO2: 94% (25 Jul 2022 04:29) (92% - 95%)    Parameters below as of 25 Jul 2022 04:29  Patient On (Oxygen Delivery Method): room air        GENERAL: NAD, lying in bed comfortably  HEAD: Atraumatic, normocephalic  EYES: EOMI, PERRLA, conjunctiva and sclera clear  ENT: Moist mucous membranes  NECK: Supple, no JVD  HEART: S1, S2, Regular rate and rhythm, no murmurs, rubs, or gallops  LUNGS: Unlabored respirations, clear to auscultation bilaterally, no crackles, wheezing, or rhonchi  ABDOMEN: Soft, nontender, nondistended, +BS  EXTREMITIES: 2+ peripheral pulses bilaterally. No clubbing, cyanosis, or edema  NERVOUS SYSTEM:  A&Ox3, no focal deficits   SKIN: No rashes or lesions    LABS:                        9.3    15.07 )-----------( 340      ( 24 Jul 2022 07:30 )             28.1     07-24    135  |  99  |  24<H>  ----------------------------<  119<H>  5.3   |  24  |  1.52<H>    Ca    9.9      24 Jul 2022 07:28  Phos  4.9     07-24  Mg     2.1     07-24    TPro  6.7  /  Alb  3.0<L>  /  TBili  0.2  /  DBili  x   /  AST  35  /  ALT  55<H>  /  AlkPhos  127<H>  07-24    PT/INR - ( 24 Jul 2022 07:30 )   PT: 15.4 sec;   INR: 1.34 ratio         PTT - ( 24 Jul 2022 07:30 )  PTT:28.9 sec            RADIOLOGY & ADDITIONAL TESTS:  Results Reviewed:   Imaging Personally Reviewed:  Electrocardiogram Personally Reviewed:    COORDINATION OF CARE:  Care Discussed with Consultants/Other Providers [Y/N]:  Prior or Outpatient Records Reviewed [Y/N]:   ***************************************************************  Faye Luz MD (PGY-1)  Internal Medicine  Pager: [phone]  ***************************************************************    PROGRESS NOTE:     Patient is a 89y old  Female who presents with a chief complaint of Fever, cough, confusion (24 Jul 2022 07:05)      SUBJECTIVE / OVERNIGHT EVENTS: Patient was seen and examined at bedside. As per nurse, patient c/o HA in the morning and was given Tylenol. Pt denies fevers, chills, chest pain, abdominal pain, shortness of breath, difficulty urinating.    MEDICATIONS  (STANDING):  albuterol/ipratropium for Nebulization 3 milliLiter(s) Nebulizer every 6 hours  apixaban 2.5 milliGRAM(s) Oral every 12 hours  calcium carbonate    500 mG (Tums) Chewable 2 Tablet(s) Chew at bedtime  cefTRIAXone   IVPB 2000 milliGRAM(s) IV Intermittent every 24 hours  lidocaine   4% Patch 1 Patch Transdermal every 24 hours  metoprolol tartrate 25 milliGRAM(s) Oral two times a day  polyethylene glycol 3350 17 Gram(s) Oral daily    MEDICATIONS  (PRN):  acetaminophen     Tablet .. 650 milliGRAM(s) Oral every 6 hours PRN Mild Pain (1 - 3), Moderate Pain (4 - 6)  guaiFENesin Oral Liquid (Sugar-Free) 200 milliGRAM(s) Oral every 6 hours PRN Cough      CAPILLARY BLOOD GLUCOSE        I&O's Summary    24 Jul 2022 07:01  -  25 Jul 2022 07:00  --------------------------------------------------------  IN: 240 mL / OUT: 1100 mL / NET: -860 mL        PHYSICAL EXAM:  Vital Signs Last 24 Hrs  T(C): 37.3 (25 Jul 2022 04:29), Max: 37.4 (24 Jul 2022 20:19)  T(F): 99.2 (25 Jul 2022 04:29), Max: 99.3 (24 Jul 2022 20:19)  HR: 78 (25 Jul 2022 04:29) (67 - 84)  BP: 168/85 (25 Jul 2022 04:29) (147/74 - 168/85)  BP(mean): --  RR: 18 (25 Jul 2022 04:29) (18 - 18)  SpO2: 94% (25 Jul 2022 04:29) (92% - 95%)    Parameters below as of 25 Jul 2022 04:29  Patient On (Oxygen Delivery Method): room air      Constitutional: NAD  HEENT: NC/AT, MMM  Respiratory: CTA b/l, good air entry b/l, no wheezing, no rhonchi, no rales, without accessory muscle use and no intercostal retractions  Cardiovascular: RRR, normal S1S2, no M/R/G  Gastrointestinal: soft, NTND, no masses palpable  Extremities: No edema appreciated on b/l lower extremities.   Neurological: AAOx3  Skin: Normal temperature, warm, dry, diffuse senile ecchymosis on b/l upper extremities.     LABS:                        9.3    15.07 )-----------( 340      ( 24 Jul 2022 07:30 )             28.1     07-24    135  |  99  |  24<H>  ----------------------------<  119<H>  5.3   |  24  |  1.52<H>    Ca    9.9      24 Jul 2022 07:28  Phos  4.9     07-24  Mg     2.1     07-24    TPro  6.7  /  Alb  3.0<L>  /  TBili  0.2  /  DBili  x   /  AST  35  /  ALT  55<H>  /  AlkPhos  127<H>  07-24    PT/INR - ( 24 Jul 2022 07:30 )   PT: 15.4 sec;   INR: 1.34 ratio         PTT - ( 24 Jul 2022 07:30 )  PTT:28.9 sec            RADIOLOGY & ADDITIONAL TESTS:  Results Reviewed:   Imaging Personally Reviewed:  Electrocardiogram Personally Reviewed:    COORDINATION OF CARE:  Care Discussed with Consultants/Other Providers [Y/N]:  Prior or Outpatient Records Reviewed [Y/N]:

## 2022-07-25 NOTE — PROGRESS NOTE ADULT - SUBJECTIVE AND OBJECTIVE BOX
Date of Service   07-25-22 @ 13:47    Patient is a 89y old  Female who presents with a chief complaint of Fever, cough, confusion (25 Jul 2022 07:33)      INTERVAL HISTORY: pt feels ok      REVIEW OF SYSTEMS:   CONSTITUTIONAL: No weakness  EYES/ENT: No visual changes; No throat pain  Neck: No pain or stiffness  Respiratory: No cough, wheezing, No shortness of breath  CARDIOVASCULAR: no chest pain or palpitations  GASTROINTESTINAL: No abdominal pain, no nausea, vomiting or hematemesis  GENITOURINARY: No dysuria, frequency or hematuria  NEUROLOGICAL: No stroke like symptoms  SKIN: No rashes    	  MEDICATIONS:  metoprolol tartrate 25 milliGRAM(s) Oral two times a day        PHYSICAL EXAM:  T(C): 36.7 (07-25-22 @ 12:39), Max: 37.4 (07-24-22 @ 20:19)  HR: 63 (07-25-22 @ 12:39) (63 - 84)  BP: 110/75 (07-25-22 @ 12:39) (110/75 - 178/88)  RR: 16 (07-25-22 @ 12:39) (16 - 18)  SpO2: 95% (07-25-22 @ 12:39) (92% - 95%)  Wt(kg): --  I&O's Summary    24 Jul 2022 07:01  -  25 Jul 2022 07:00  --------------------------------------------------------  IN: 240 mL / OUT: 1100 mL / NET: -860 mL    25 Jul 2022 07:01  -  25 Jul 2022 13:47  --------------------------------------------------------  IN: 650 mL / OUT: 200 mL / NET: 450 mL          Appearance: In no distress	  HEENT:    PERRL, EOMI	  Cardiovascular:  S1 S2, No JVD  Respiratory: Lungs clear to auscultation	  Gastrointestinal:  Soft, Non-tender, + BS	  Vasculature:  No edema of LE  Psychiatric: Appropriate affect   Neuro: no acute focal deficits                               9.7    12.33 )-----------( 380      ( 25 Jul 2022 07:33 )             29.8     07-25    132<L>  |  96  |  20  ----------------------------<  108<H>  4.9   |  26  |  1.46<H>    Ca    9.6      25 Jul 2022 07:31  Phos  4.7     07-25  Mg     1.8     07-25    TPro  6.6  /  Alb  2.9<L>  /  TBili  0.3  /  DBili  x   /  AST  29  /  ALT  50<H>  /  AlkPhos  106  07-25        Labs personally reviewed      ASSESSMENT/PLAN: 	  Ms. Hadley is a 89 yo female with PMH of HTN, IBS, osteoporosis, anemia, recent admission fpr fall and R shoulder dislocation and Hill-Sachs lesion/ fracture and discharged to rehab presents for fever, lethargy, altered mental status and reported hypoxia for 2 days found to have e-coli urosepsis and now with new onset AF.    Problem/Plan -1  Problem: New Onset Atrial Fibrillation  - reported overnight with HR in the 150s  - Admission EKG with sinus tach and frequent PVCs  - AF confirmed   - Chadsvasc score 4   - pt on Eliquis 2.5 mg PO BID for AC   - Pt on Metoprolol PO for rate control   - TTE reveals preserved EF with severe AS -- Appreciate Structural Heart Consultation    Problem/Plan -2  Problem: HTN  - previously on olmesartan  - Cont to hold olmesartan to provide room for rate control medication    Problem/Plan -3  Problem: Anemia  - CT A/P negative for RP bleed  - CBC stable --> cont to monitor closely and transfuse Hgb< 7    Problem/Plan -4  Problem: E-Coli Bacteremia  - Urine and Blood Cultures + e-coli  - c/w IV Abx   - monitor fever curve, WBC trend      Spoke with Patient's son to discuss risk of AF recurrence as well as risk VS benefits of lifelong AC. He will discuss with family and decide about AC.         Olga Askew FNP-BC   Rodger Wharton DO [facility]  Cardiovascular Medicine  [address], [address]  Office: [phone]

## 2022-07-25 NOTE — PROGRESS NOTE ADULT - ATTENDING COMMENTS
89F w/ hx of HTN, IBS, osteoporosis, anemia, recent admission for R shoulder dislocation and Hill-Sachs lesion/ fracture due to fall and discharged to rehab facility, presents from rehab facility for sepsis secondary to UTI which are in the setting of anemia and LIAM. Found to have E coli in urine and blood cultures on 2 g IV Ceftriaxone and clinical signs of infection are currently improving, course complicated by new onset Afib w/ RVR, now rate is controlled currently on lopressor 25 mg BID with HR now predominantly in the 60-80's.  Cont with Eliquis as per cardio.  She was found to have severe aortic stenosis w/ preserved EF on TTE (7/21) Structural heart team consulted due to Aortic stenosis         Marta nieto   hospitalist   [phone]

## 2022-07-25 NOTE — PROGRESS NOTE ADULT - PROBLEM SELECTOR PLAN 1
Fever, leukocytosis (WBC of 12.8), hypotension, tachycardia, Lactate of 1.7 on admission to 1.2 on 7/22.   - Blood culture and urine cultures positive for E. coli  - on IV ceftriaxone 2g. adjusted to current regimen on 7/22, goal for 7 days of therapy, last day of therapy is 7/28  - f/u Bcx for final results.   - trend cbc, monitor vital signs for infection, monitor temperature curve  - elevated d-dimer down trending, (1700 -->1100), V/Q scan negative

## 2022-07-26 ENCOUNTER — TRANSCRIPTION ENCOUNTER (OUTPATIENT)
Age: 87
End: 2022-07-26

## 2022-07-26 LAB
ALBUMIN SERPL ELPH-MCNC: 3.1 G/DL — LOW (ref 3.3–5)
ALP SERPL-CCNC: 106 U/L — SIGNIFICANT CHANGE UP (ref 40–120)
ALT FLD-CCNC: 41 U/L — SIGNIFICANT CHANGE UP (ref 10–45)
ANION GAP SERPL CALC-SCNC: 13 MMOL/L — SIGNIFICANT CHANGE UP (ref 5–17)
AST SERPL-CCNC: 25 U/L — SIGNIFICANT CHANGE UP (ref 10–40)
BASOPHILS # BLD AUTO: 0.09 K/UL — SIGNIFICANT CHANGE UP (ref 0–0.2)
BASOPHILS NFR BLD AUTO: 0.8 % — SIGNIFICANT CHANGE UP (ref 0–2)
BILIRUB SERPL-MCNC: 0.3 MG/DL — SIGNIFICANT CHANGE UP (ref 0.2–1.2)
BUN SERPL-MCNC: 22 MG/DL — SIGNIFICANT CHANGE UP (ref 7–23)
CALCIUM SERPL-MCNC: 10.2 MG/DL — SIGNIFICANT CHANGE UP (ref 8.4–10.5)
CHLORIDE SERPL-SCNC: 94 MMOL/L — LOW (ref 96–108)
CO2 SERPL-SCNC: 27 MMOL/L — SIGNIFICANT CHANGE UP (ref 22–31)
CREAT SERPL-MCNC: 1.56 MG/DL — HIGH (ref 0.5–1.3)
EGFR: 31 ML/MIN/1.73M2 — LOW
EOSINOPHIL # BLD AUTO: 0.33 K/UL — SIGNIFICANT CHANGE UP (ref 0–0.5)
EOSINOPHIL NFR BLD AUTO: 3.1 % — SIGNIFICANT CHANGE UP (ref 0–6)
GLUCOSE SERPL-MCNC: 106 MG/DL — HIGH (ref 70–99)
HCT VFR BLD CALC: 32.9 % — LOW (ref 34.5–45)
HGB BLD-MCNC: 10.7 G/DL — LOW (ref 11.5–15.5)
IMM GRANULOCYTES NFR BLD AUTO: 4.4 % — HIGH (ref 0–1.5)
LYMPHOCYTES # BLD AUTO: 1.67 K/UL — SIGNIFICANT CHANGE UP (ref 1–3.3)
LYMPHOCYTES # BLD AUTO: 15.5 % — SIGNIFICANT CHANGE UP (ref 13–44)
MAGNESIUM SERPL-MCNC: 1.9 MG/DL — SIGNIFICANT CHANGE UP (ref 1.6–2.6)
MCHC RBC-ENTMCNC: 30 PG — SIGNIFICANT CHANGE UP (ref 27–34)
MCHC RBC-ENTMCNC: 32.5 GM/DL — SIGNIFICANT CHANGE UP (ref 32–36)
MCV RBC AUTO: 92.2 FL — SIGNIFICANT CHANGE UP (ref 80–100)
MONOCYTES # BLD AUTO: 0.58 K/UL — SIGNIFICANT CHANGE UP (ref 0–0.9)
MONOCYTES NFR BLD AUTO: 5.4 % — SIGNIFICANT CHANGE UP (ref 2–14)
NEUTROPHILS # BLD AUTO: 7.62 K/UL — HIGH (ref 1.8–7.4)
NEUTROPHILS NFR BLD AUTO: 70.8 % — SIGNIFICANT CHANGE UP (ref 43–77)
NRBC # BLD: 0 /100 WBCS — SIGNIFICANT CHANGE UP (ref 0–0)
PHOSPHATE SERPL-MCNC: 4.6 MG/DL — HIGH (ref 2.5–4.5)
PLATELET # BLD AUTO: 411 K/UL — HIGH (ref 150–400)
POTASSIUM SERPL-MCNC: 4.9 MMOL/L — SIGNIFICANT CHANGE UP (ref 3.5–5.3)
POTASSIUM SERPL-SCNC: 4.9 MMOL/L — SIGNIFICANT CHANGE UP (ref 3.5–5.3)
PROT SERPL-MCNC: 6.9 G/DL — SIGNIFICANT CHANGE UP (ref 6–8.3)
RBC # BLD: 3.57 M/UL — LOW (ref 3.8–5.2)
RBC # FLD: 14.2 % — SIGNIFICANT CHANGE UP (ref 10.3–14.5)
SODIUM SERPL-SCNC: 134 MMOL/L — LOW (ref 135–145)
WBC # BLD: 10.76 K/UL — HIGH (ref 3.8–10.5)
WBC # FLD AUTO: 10.76 K/UL — HIGH (ref 3.8–10.5)

## 2022-07-26 PROCEDURE — 99232 SBSQ HOSP IP/OBS MODERATE 35: CPT | Mod: GC

## 2022-07-26 RX ORDER — AMLODIPINE BESYLATE 2.5 MG/1
5 TABLET ORAL DAILY
Refills: 0 | Status: DISCONTINUED | OUTPATIENT
Start: 2022-07-26 | End: 2022-07-27

## 2022-07-26 RX ADMIN — Medication 25 MILLIGRAM(S): at 21:08

## 2022-07-26 RX ADMIN — Medication 3 MILLILITER(S): at 12:22

## 2022-07-26 RX ADMIN — CEFTRIAXONE 100 MILLIGRAM(S): 500 INJECTION, POWDER, FOR SOLUTION INTRAMUSCULAR; INTRAVENOUS at 12:29

## 2022-07-26 RX ADMIN — Medication 25 MILLIGRAM(S): at 12:20

## 2022-07-26 RX ADMIN — APIXABAN 2.5 MILLIGRAM(S): 2.5 TABLET, FILM COATED ORAL at 17:11

## 2022-07-26 RX ADMIN — Medication 2 TABLET(S): at 17:13

## 2022-07-26 RX ADMIN — Medication 3 MILLILITER(S): at 17:11

## 2022-07-26 RX ADMIN — APIXABAN 2.5 MILLIGRAM(S): 2.5 TABLET, FILM COATED ORAL at 06:36

## 2022-07-26 NOTE — DISCHARGE NOTE PROVIDER - NSDCFUADDAPPT_GEN_ALL_CORE_FT
Please follow up with your primary care provider in 1 week regarding your fracture and possible orthopedics evaluation. Please follow up with your primary care provider in 1 week regarding your fracture and possible orthopedics evaluation.    Please follow up with Dr. Fischer for discussion about management of your sever aortic stenosis within 2 weeks of hospital discharge.    Please follow up with your cardiologist within 1 week of hospital discharge for management of your atrial fibrillation.  Please follow up with your primary care provider in 1 week.    You have an appointment scheduled for 8/4/22 at 1:15am with Dr. Shea (orthopedics)    Please follow up with Dr. Fischer for discussion about management of your sever aortic stenosis within 2 weeks of hospital discharge.    Please follow up with your cardiologist within 1 week of hospital discharge for management of your atrial fibrillation.

## 2022-07-26 NOTE — PROGRESS NOTE ADULT - PROBLEM SELECTOR PLAN 5
- Severe aortic stenosis w/ preserved EF found on TTE 7/21  - Structural heart team consulted  - patient's son declined workup to include Cardiac CT and cardiac catheterization  - can f/u outpatient. - Severe aortic stenosis w/ preserved EF found on TTE 7/21  - Structural heart team consulted  - 7/25: patient's son declined workup for cardiac CT and cardiac catheterization and will f/u with structural cardiology o/p

## 2022-07-26 NOTE — PROGRESS NOTE ADULT - SUBJECTIVE AND OBJECTIVE BOX
DATE OF SERVICE: 07-26-22 @ 10:27    Patient is a 89y old  Female who presents with a chief complaint of Fever, cough, confusion (26 Jul 2022 07:17)      INTERVAL HISTORY: Feels ok.     REVIEW OF SYSTEMS:  CONSTITUTIONAL: No weakness  EYES/ENT: No visual changes;  No throat pain   NECK: No pain or stiffness  RESPIRATORY: No cough, wheezing; No shortness of breath  CARDIOVASCULAR: No chest pain or palpitations  GASTROINTESTINAL: No abdominal  pain. No nausea, vomiting, or hematemesis  GENITOURINARY: No dysuria, frequency or hematuria  NEUROLOGICAL: No stroke like symptoms  SKIN: No rashes    	  MEDICATIONS:  metoprolol tartrate 25 milliGRAM(s) Oral two times a day        PHYSICAL EXAM:  T(C): 36.3 (07-26-22 @ 08:30), Max: 37.4 (07-25-22 @ 20:40)  HR: 76 (07-26-22 @ 08:30) (63 - 81)  BP: 171/90 (07-26-22 @ 08:30) (110/75 - 171/90)  RR: 18 (07-26-22 @ 08:30) (16 - 18)  SpO2: 95% (07-26-22 @ 08:30) (93% - 95%)  Wt(kg): --  I&O's Summary    25 Jul 2022 07:01  -  26 Jul 2022 07:00  --------------------------------------------------------  IN: 890 mL / OUT: 1300 mL / NET: -410 mL    26 Jul 2022 07:01  -  26 Jul 2022 10:27  --------------------------------------------------------  IN: 240 mL / OUT: 0 mL / NET: 240 mL          Appearance: In no distress	  HEENT:    PERRL, EOMI	  Cardiovascular:  S1 S2, No JVD  Respiratory: Lungs clear to auscultation	  Gastrointestinal:  Soft, Non-tender, + BS	  Vascularature:  No edema of LE  Psychiatric: Appropriate affect   Neuro: no acute focal deficits                               10.7   10.76 )-----------( 411      ( 26 Jul 2022 07:20 )             32.9     07-26    134<L>  |  94<L>  |  22  ----------------------------<  106<H>  4.9   |  27  |  1.56<H>    Ca    10.2      26 Jul 2022 07:20  Phos  4.6     07-26  Mg     1.9     07-26    TPro  6.9  /  Alb  3.1<L>  /  TBili  0.3  /  DBili  x   /  AST  25  /  ALT  41  /  AlkPhos  106  07-26        Labs personally reviewed      ASSESSMENT/PLAN: 	    Ms. Hadley is a 89 yo female with PMH of HTN, IBS, osteoporosis, anemia, recent admission fpr fall and R shoulder dislocation and Hill-Sachs lesion/ fracture and discharged to rehab presents for fever, lethargy, altered mental status and reported hypoxia for 2 days found to have e-coli urosepsis and now with new onset AF.    Problem/Plan -1  Problem: New Onset Atrial Fibrillation  - reported overnight with HR in the 150s  - Admission EKG with sinus tach and frequent PVCs  - AF confirmed   - Chadsvasc score 4   - pt on Eliquis 2.5 mg PO BID for AC   - Pt on Metoprolol PO for rate control   - TTE reveals preserved EF with severe AS     Problem/Plan -2  Problem: HTN  - previously on olmesartan  - Cont to hold olmesartan to provide room for rate control medication  - BP uptrending, can restart home ARB    Problem/Plan -3  Problem: Anemia  - CT A/P negative for RP bleed  - CBC stable --> cont to monitor closely and transfuse Hgb< 7    Problem/Plan -4  Problem: E-Coli Bacteremia  - Urine and Blood Cultures + e-coli  - c/w IV Abx   - monitor fever curve, WBC trend    Problem/Plan -5  Problem: Severe AS  - Structural Heart consult appreciated  - As per patient and son's request, no inpatient workup for TAVR at this time  - Will follow up as outpatient    Dorita Butt, ALTAGRACIA-ESTELLE Wharton DO [facility]  Cardiovascular Medicine  [address], [address]  Office: [phone]  Cell: [phone] DATE OF SERVICE: 07-26-22 @ 10:27    Patient is a 89y old  Female who presents with a chief complaint of Fever, cough, confusion (26 Jul 2022 07:17)      INTERVAL HISTORY: Feels ok.     REVIEW OF SYSTEMS:  CONSTITUTIONAL: No weakness  EYES/ENT: No visual changes;  No throat pain   NECK: No pain or stiffness  RESPIRATORY: No cough, wheezing; No shortness of breath  CARDIOVASCULAR: No chest pain or palpitations  GASTROINTESTINAL: No abdominal  pain. No nausea, vomiting, or hematemesis  GENITOURINARY: No dysuria, frequency or hematuria  NEUROLOGICAL: No stroke like symptoms  SKIN: No rashes    	  MEDICATIONS:  metoprolol tartrate 25 milliGRAM(s) Oral two times a day        PHYSICAL EXAM:  T(C): 36.3 (07-26-22 @ 08:30), Max: 37.4 (07-25-22 @ 20:40)  HR: 76 (07-26-22 @ 08:30) (63 - 81)  BP: 171/90 (07-26-22 @ 08:30) (110/75 - 171/90)  RR: 18 (07-26-22 @ 08:30) (16 - 18)  SpO2: 95% (07-26-22 @ 08:30) (93% - 95%)  Wt(kg): --  I&O's Summary    25 Jul 2022 07:01  -  26 Jul 2022 07:00  --------------------------------------------------------  IN: 890 mL / OUT: 1300 mL / NET: -410 mL    26 Jul 2022 07:01  -  26 Jul 2022 10:27  --------------------------------------------------------  IN: 240 mL / OUT: 0 mL / NET: 240 mL          Appearance: In no distress	  HEENT:    PERRL, EOMI	  Cardiovascular:  S1 S2, No JVD  Respiratory: Lungs clear to auscultation	  Gastrointestinal:  Soft, Non-tender, + BS	  Vascularature:  No edema of LE  Psychiatric: Appropriate affect   Neuro: no acute focal deficits                               10.7   10.76 )-----------( 411      ( 26 Jul 2022 07:20 )             32.9     07-26    134<L>  |  94<L>  |  22  ----------------------------<  106<H>  4.9   |  27  |  1.56<H>    Ca    10.2      26 Jul 2022 07:20  Phos  4.6     07-26  Mg     1.9     07-26    TPro  6.9  /  Alb  3.1<L>  /  TBili  0.3  /  DBili  x   /  AST  25  /  ALT  41  /  AlkPhos  106  07-26        Labs personally reviewed      ASSESSMENT/PLAN: 	    Ms. Hadley is a 89 yo female with PMH of HTN, IBS, osteoporosis, anemia, recent admission fpr fall and R shoulder dislocation and Hill-Sachs lesion/ fracture and discharged to rehab presents for fever, lethargy, altered mental status and reported hypoxia for 2 days found to have e-coli urosepsis and now with new onset AF.    Problem/Plan -1  Problem: New Onset Atrial Fibrillation  - reported overnight with HR in the 150s  - Admission EKG with sinus tach and frequent PVCs  - AF confirmed   - Chadsvasc score 4   - pt on Eliquis 2.5 mg PO BID for AC   - Pt on Metoprolol PO for rate control   - TTE reveals preserved EF with severe AS     Problem/Plan -2  Problem: HTN  - previously on olmesartan  - Cont to hold olmesartan to provide room for rate control medication  - BP uptrending, can restart home ARB    Problem/Plan -3  Problem: Anemia  - CT A/P negative for RP bleed  - CBC stable --> cont to monitor closely and transfuse Hgb< 7    Problem/Plan -4  Problem: E-Coli Bacteremia  - Urine and Blood Cultures + e-coli  - c/w IV Abx   - monitor fever curve, WBC trend    Problem/Plan -5  Problem: Severe AS  - Structural Heart consult appreciated  - As per patient and son's request, no inpatient workup for TAVR at this time  - Will follow up as outpatient        Dorita Butt, ALTAGRACIA-ESTELLE Wharton DO [facility]  Cardiovascular Medicine  [address], [address]  Office: [phone]  Cell: [phone]

## 2022-07-26 NOTE — PROGRESS NOTE ADULT - ASSESSMENT
89F w/ hx of HTN, IBS, osteoporosis, anemia, recent admission for R shoulder dislocation and Hill-Sachs lesion/ fracture due to fall and discharged to rehab facility, presents from rehab facility for sepsis secondary to UTI which are in the setting of anemia and LIAM. Found to have E coli in urine and blood cultures on 2 g IV Ceftriaxone and clinical signs of infection are currently improving, course complicated by new onset Afib w/ RVR, currently on lopressor 25 mg BID with HR now predominantly in the 60-80's. Found to have severe aortic stenosis w/ preserved EF on TTE (7/21) Structural heart team consulted and subsequent workup pending family discussion.  89F w/ hx of HTN, IBS, osteoporosis, anemia, recent admission for R shoulder dislocation and Hill-Sachs lesion/ fracture due to fall and discharged to rehab facility, presents from rehab facility for sepsis secondary to UTI which are in the setting of anemia and LIAM. Found to have E coli in urine and blood cultures on 2 g IV Ceftriaxone and clinical signs of infection are currently improving, course complicated by new onset Afib w/ RVR, currently on lopressor 25 mg BID with HR now predominantly in the 60-80's. Found to have severe aortic stenosis w/ preserved EF on TTE (7/21) Structural heart team consulted and subsequent workup declined for cardiac cath and CT s/p family discussion.

## 2022-07-26 NOTE — PROGRESS NOTE ADULT - SUBJECTIVE AND OBJECTIVE BOX
***************************************************************  Faye Luz MD (PGY-1)  Internal Medicine  Pager: [phone]  ***************************************************************    PROGRESS NOTE:     Patient is a 89y old  Female who presents with a chief complaint of Fever, cough, confusion (25 Jul 2022 13:47)      SUBJECTIVE / OVERNIGHT EVENTS: Patient seen and examined at bedside. No acute overnight events. This morning, the patient is comfortable and doing well. No acute complaints. Denies fevers, chills, N/V/D, chest pain, SOB, abdominal pain.    MEDICATIONS  (STANDING):  albuterol/ipratropium for Nebulization 3 milliLiter(s) Nebulizer every 6 hours  apixaban 2.5 milliGRAM(s) Oral every 12 hours  calcium carbonate    500 mG (Tums) Chewable 2 Tablet(s) Chew at bedtime  cefTRIAXone   IVPB 2000 milliGRAM(s) IV Intermittent every 24 hours  lidocaine   4% Patch 1 Patch Transdermal every 24 hours  metoprolol tartrate 25 milliGRAM(s) Oral two times a day  polyethylene glycol 3350 17 Gram(s) Oral daily    MEDICATIONS  (PRN):  acetaminophen     Tablet .. 650 milliGRAM(s) Oral every 6 hours PRN Mild Pain (1 - 3), Moderate Pain (4 - 6)  guaiFENesin Oral Liquid (Sugar-Free) 200 milliGRAM(s) Oral every 6 hours PRN Cough      CAPILLARY BLOOD GLUCOSE        I&O's Summary    25 Jul 2022 07:01  -  26 Jul 2022 07:00  --------------------------------------------------------  IN: 890 mL / OUT: 750 mL / NET: 140 mL        PHYSICAL EXAM:  Vital Signs Last 24 Hrs  T(C): 36.9 (26 Jul 2022 04:28), Max: 37.4 (25 Jul 2022 20:40)  T(F): 98.4 (26 Jul 2022 04:28), Max: 99.4 (25 Jul 2022 20:40)  HR: 74 (26 Jul 2022 04:28) (63 - 81)  BP: 163/95 (26 Jul 2022 04:28) (110/75 - 178/88)  BP(mean): --  RR: 18 (26 Jul 2022 04:28) (16 - 18)  SpO2: 93% (26 Jul 2022 04:28) (93% - 95%)    Parameters below as of 26 Jul 2022 04:28  Patient On (Oxygen Delivery Method): room air        GENERAL: NAD, lying in bed comfortably  HEAD: Atraumatic, normocephalic  EYES: EOMI, PERRLA, conjunctiva and sclera clear  ENT: Moist mucous membranes  NECK: Supple, no JVD  HEART: S1, S2, Regular rate and rhythm, no murmurs, rubs, or gallops  LUNGS: Unlabored respirations, clear to auscultation bilaterally, no crackles, wheezing, or rhonchi  ABDOMEN: Soft, nontender, nondistended, +BS  EXTREMITIES: 2+ peripheral pulses bilaterally. No clubbing, cyanosis, or edema  NERVOUS SYSTEM:  A&Ox3, no focal deficits   SKIN: No rashes or lesions    LABS:                        9.7    12.33 )-----------( 380      ( 25 Jul 2022 07:33 )             29.8     07-25    132<L>  |  96  |  20  ----------------------------<  108<H>  4.9   |  26  |  1.46<H>    Ca    9.6      25 Jul 2022 07:31  Phos  4.7     07-25  Mg     1.8     07-25    TPro  6.6  /  Alb  2.9<L>  /  TBili  0.3  /  DBili  x   /  AST  29  /  ALT  50<H>  /  AlkPhos  106  07-25    PT/INR - ( 24 Jul 2022 07:30 )   PT: 15.4 sec;   INR: 1.34 ratio         PTT - ( 24 Jul 2022 07:30 )  PTT:28.9 sec            RADIOLOGY & ADDITIONAL TESTS:  Results Reviewed:   Imaging Personally Reviewed:  Electrocardiogram Personally Reviewed:    COORDINATION OF CARE:  Care Discussed with Consultants/Other Providers [Y/N]:  Prior or Outpatient Records Reviewed [Y/N]:   ***************************************************************  Faye Luz MD (PGY-1)  Internal Medicine  Pager: [phone]  ***************************************************************    PROGRESS NOTE:     Patient is a 89y old  Female who presents with a chief complaint of Fever, cough, confusion (25 Jul 2022 13:47)      SUBJECTIVE / OVERNIGHT EVENTS: Patient seen and examined at bedside. No acute overnight events. Pt reports abdominal discomfort and bloating but denies fevers, chills, N/V/D, chest pain, SOB, and abdominal pain.    MEDICATIONS  (STANDING):  albuterol/ipratropium for Nebulization 3 milliLiter(s) Nebulizer every 6 hours  apixaban 2.5 milliGRAM(s) Oral every 12 hours  calcium carbonate    500 mG (Tums) Chewable 2 Tablet(s) Chew at bedtime  cefTRIAXone   IVPB 2000 milliGRAM(s) IV Intermittent every 24 hours  lidocaine   4% Patch 1 Patch Transdermal every 24 hours  metoprolol tartrate 25 milliGRAM(s) Oral two times a day  polyethylene glycol 3350 17 Gram(s) Oral daily    MEDICATIONS  (PRN):  acetaminophen     Tablet .. 650 milliGRAM(s) Oral every 6 hours PRN Mild Pain (1 - 3), Moderate Pain (4 - 6)  guaiFENesin Oral Liquid (Sugar-Free) 200 milliGRAM(s) Oral every 6 hours PRN Cough      CAPILLARY BLOOD GLUCOSE        I&O's Summary    25 Jul 2022 07:01  -  26 Jul 2022 07:00  --------------------------------------------------------  IN: 890 mL / OUT: 750 mL / NET: 140 mL        PHYSICAL EXAM:  Vital Signs Last 24 Hrs  T(C): 36.9 (26 Jul 2022 04:28), Max: 37.4 (25 Jul 2022 20:40)  T(F): 98.4 (26 Jul 2022 04:28), Max: 99.4 (25 Jul 2022 20:40)  HR: 74 (26 Jul 2022 04:28) (63 - 81)  BP: 163/95 (26 Jul 2022 04:28) (110/75 - 178/88)  BP(mean): --  RR: 18 (26 Jul 2022 04:28) (16 - 18)  SpO2: 93% (26 Jul 2022 04:28) (93% - 95%)    Parameters below as of 26 Jul 2022 04:28  Patient On (Oxygen Delivery Method): room air      Constitutional: NAD  HEENT: NC/AT, MMM  Respiratory: CTA b/l, good air entry b/l, no wheezing, no rhonchi, no rales  Cardiovascular: RRR, normal S1S2  Gastrointestinal: soft, NTND, no masses palpable  Extremities: No edema appreciated on b/l lower extremities  Neurological: AAOx3  Skin: Normal temperature, warm, dry, diffuse senile ecchymosis on b/l upper extremities.       LABS:                        9.7    12.33 )-----------( 380      ( 25 Jul 2022 07:33 )             29.8     07-25    132<L>  |  96  |  20  ----------------------------<  108<H>  4.9   |  26  |  1.46<H>    Ca    9.6      25 Jul 2022 07:31  Phos  4.7     07-25  Mg     1.8     07-25    TPro  6.6  /  Alb  2.9<L>  /  TBili  0.3  /  DBili  x   /  AST  29  /  ALT  50<H>  /  AlkPhos  106  07-25    PT/INR - ( 24 Jul 2022 07:30 )   PT: 15.4 sec;   INR: 1.34 ratio         PTT - ( 24 Jul 2022 07:30 )  PTT:28.9 sec            RADIOLOGY & ADDITIONAL TESTS:  Results Reviewed:   Imaging Personally Reviewed:  Electrocardiogram Personally Reviewed:    COORDINATION OF CARE:  Care Discussed with Consultants/Other Providers [Y/N]:  Prior or Outpatient Records Reviewed [Y/N]:

## 2022-07-26 NOTE — DISCHARGE NOTE PROVIDER - HOSPITAL COURSE
PT presented on 7/18/22 89F w/ hx of HTN, IBS, osteoporosis, anemia, recent admission fpr fall and R shoulder dislocation and Hill-Sachs lesion/ fracture and discharged to rehab presents for severe sepsis, hypoxic respiratory failure, acute metabolic encephalopathy 2/2 and UTI in setting of anemia and LIAM. Pt was started on zosyn. Pt presented with diffuse ecchymosis on the b/l upper extremities as well as periorbital ecchymosis that were from fall during prior hospitalization. On 7/19, pt was found to be anemic to Hgb of 6.5 and received 1 unit of pRBCs which resolved anemia and  subsequent Hgb being 9.4. Pt had no evidence of GI losses of blood and Pt went for a CT Adomen/pelvis that was unremarkable for a retroperitoneal bleed. Laboratories to workup hemolysis and DIC were sent , and urine studies demonstrated a pre-renal FeNa with low urine sodium indicating poor oral intake. Pt was noted to have a D-dimer of >1000 which trended up on 7/20, therefore a V/Q scan was performed which demonstrated low suspicion of PE. Pt also had an up-trending lactate and pt was given 1L of LR. On 7/21, pt developed new onset Atrial fibrillation with RVR, cardiology was consulted and pt was started on Lopressor 25 mg PO BID. A TTE was ordered and antibiotics were narrowed  from zosyn to ceftriaxone, cultures of the blood and urine from 7/18 growing positive for E coli. On 7/22, after discussions with pt's family about the risks and benefits of starting anticoagulation, pt was started on Eliquis 2.5 mg bid. Pt's TTE revealed severe AS w/ preserved EF and structural heart team was consulted and recommended workup, however the family wished to defer workup until pt was completely recovered from bacatermic infection. On 7/23, pt received bladder scans which were negative for retention. On 7/24, pt was status quo and antitbiotics were continued. On 7/26, pt was started on Amlodipine 5 mg daily. Family twishes to follow up outpatient for evaluation and workup for Aortic stenosis with structural heart team. Last day of antibiotic course for patient is 7/28/22.            PT presented on 7/18/22 89F w/ hx of HTN, IBS, osteoporosis, anemia, recent admission fpr fall and R shoulder dislocation and Hill-Sachs lesion/ fracture and discharged to rehab presents for severe sepsis, hypoxic respiratory failure, acute metabolic encephalopathy 2/2 and UTI in setting of anemia and LIAM. Pt was started on zosyn. Pt presented with diffuse ecchymosis on the b/l upper extremities as well as periorbital ecchymosis that were from fall during prior hospitalization. On 7/19, pt was found to be anemic to Hgb of 6.5 and received 1 unit of pRBCs which resolved anemia and  subsequent Hgb being 9.4. Pt had no evidence of GI losses of blood and Pt went for a CT Adomen/pelvis that was unremarkable for a retroperitoneal bleed. Laboratories to workup hemolysis and DIC were sent , and urine studies demonstrated a pre-renal FeNa with low urine sodium indicating poor oral intake. Pt was noted to have a D-dimer of >1000 which trended up on 7/20, therefore a V/Q scan was performed which demonstrated low suspicion of PE. Pt also had an up-trending lactate and pt was given 1L of LR. On 7/21, pt developed new onset Atrial fibrillation with RVR, cardiology was consulted and pt was started on Lopressor 25 mg PO BID. A TTE was ordered and antibiotics were narrowed  from zosyn to ceftriaxone, cultures of the blood and urine from 7/18 growing positive for E coli. On 7/22, after discussions with pt's family about the risks and benefits of starting anticoagulation, pt was started on Eliquis 2.5 mg bid. Pt's TTE revealed severe AS w/ preserved EF and structural heart team was consulted and recommended workup, however the family wished to defer workup until pt was completely recovered from bacatermic infection. On 7/23, pt received bladder scans which were negative for retention. On 7/24, pt was status quo and antitbiotics were continued. On 7/26, pt was started on Amlodipine 5 mg daily. Family wishes to follow up outpatient for evaluation and workup for Aortic stenosis with structural heart team. Last day of antibiotic course for patient is 7/28/22. Family wishes to pursue discharge home with home PT.            PT presented on 7/18/22 89F w/ hx of HTN, IBS, osteoporosis, anemia, recent admission fpr fall and R shoulder dislocation and Hill-Sachs lesion/ fracture and discharged to rehab presents for severe sepsis, hypoxic respiratory failure, acute metabolic encephalopathy 2/2 and UTI in setting of anemia and LIAM. Pt was started on zosyn. Pt presented with diffuse ecchymosis on the b/l upper extremities as well as periorbital ecchymosis that were from fall during prior hospitalization. On 7/19, pt was found to be anemic to Hgb of 6.5 and received 1 unit of pRBCs which resolved anemia and  subsequent Hgb being 9.4. Pt had no evidence of GI losses of blood and Pt went for a CT Adomen/pelvis that was unremarkable for a retroperitoneal bleed. Laboratories to workup hemolysis and DIC were sent , and urine studies demonstrated a pre-renal FeNa with low urine sodium indicating poor oral intake. Pt was noted to have a D-dimer of >1000 which trended up on 7/20, therefore a V/Q scan was performed which demonstrated low suspicion of PE. Pt also had an up-trending lactate and pt was given 1L of LR. On 7/21, pt developed new onset Atrial fibrillation with RVR, cardiology was consulted and pt was started on Lopressor 25 mg PO BID. A TTE was ordered and antibiotics were narrowed  from zosyn to ceftriaxone, cultures of the blood and urine from 7/18 growing positive for E coli. On 7/22, after discussions with pt's family about the risks and benefits of starting anticoagulation, pt was started on Eliquis 2.5 mg bid. Pt's TTE revealed severe AS w/ preserved EF and structural heart team was consulted and recommended workup, however the family wished to defer workup until pt was completely recovered from bacatermic infection. On 7/23, pt received bladder scans which were negative for retention. On 7/24, pt was status quo and antitbiotics were continued. On 7/26, pt was started on Amlodipine 5 mg daily. Family wishes to follow up outpatient for evaluation and workup for Aortic stenosis with structural heart team. Last day of antibiotic course for patient is 7/28/22. Family wishes to pursue discharge home with home PT.   Patient is a 89F w/ PMHx of HTN, IBS, osteoporosis, anemia, recent admission for a fall and R shoulder dislocation and Hill-Sachs lesion/fracture (discharged to rehab) presenting from rehab with severe sepsis, hypoxic respiratory failure, acute metabolic encephalopathy 2/2 UTI in setting of anemia and LIAM. Pt was started on zosyn in the ED. She also presented with diffuse ecchymosis on the b/l upper extremities as well as periorbital ecchymosis that were fromfall during prior hospitalization. On 7/19, pt was found to be anemic with a Hgb of 6.5 and received 1 unit of pRBCs which resolved anemia. Pt had no evidence of GIB and CT a/p was unremarkable for a retroperitoneal bleed. Laboratories to workup for hemolysis and DIC were sent, and urine studies demonstrated a pre-renal FeNa with low urine sodium indicating poor oral intake. Pt was noted to have a D-dimer of  1700 and a V/Q scan was performed which demonstrated low suspicion of PE. Pt also had an up-trending lactate and pt was given 1L of LR.     On 7/21, hospital course was c/b new onset Atrial fibrillation with RVR, and cardiology was consulted. Pt was started on Lopressor 25 mg PO BID. A TTE was ordered and antibiotics were narrowed from zosyn to ceftriaxone, cultures of the blood and urine from 7/18 grew E coli. On 7/22, after discussions with pt's family about the risks and benefits of starting anticoagulation, pt was started on Eliquis 2.5 mg bid. Pt's TTE revealed severe AS w/ preserved EF and structural heart team was consulted and recommended workup; however, the family wished to defer workup until pt was completely recovered from bacatermic infection and eventually deferred to outpatient follow up with structural cardiology. On 7/23, pt received bladder scans which were negative for retention. On 7/24, pt was status quo and antitbiotics were continued. Since patient's SBPs went above 170, pt was started on Amlodipine 5 mg daily as her home olmesartan was held in setting of presumed LIAM.     Patient completed her antibiotic course 7/28/22. Patient is not HD stable and ready to be discharged home with home PT. She will follow up with structural cardiology for her new severe AS and with cardiology for management of her new onset Afib with RVR. She will be discharged on eliquis and metoprolol. Patient will be discharged with amlodipine and omesartan will be held in setting of presumed LIAM, to be restarted by her PCP outpatient.   Patient is a 89F w/ PMHx of HTN, IBS, osteoporosis, anemia, recent admission for a fall and R shoulder dislocation and Hill-Sachs lesion/fracture (discharged to rehab) presenting from rehab with severe sepsis, hypoxic respiratory failure, acute metabolic encephalopathy 2/2 UTI in setting of anemia and LIAM. Pt was started on zosyn in the ED. She also presented with diffuse ecchymosis on the b/l upper extremities as well as periorbital ecchymosis that were from a fall during prior hospitalization. On 7/19, pt was found to be anemic with a Hgb of 6.5 and received 1 unit of pRBCs which resolved anemia. Pt had no evidence of GIB and CT a/p was unremarkable for a retroperitoneal bleed. Laboratories to workup for hemolysis and DIC were sent, and urine studies demonstrated a pre-renal FeNa with low urine sodium indicating poor oral intake. Pt was noted to have a D-dimer of  1700 and a V/Q scan was performed which demonstrated low suspicion of PE. Pt also had an up-trending lactate and pt was given 1L of LR.     On 7/21, hospital course was c/b new onset Atrial fibrillation with RVR, and cardiology was consulted. Pt was started on Lopressor 25 mg PO BID. A TTE was ordered and antibiotics were narrowed from zosyn to ceftriaxone, cultures of the blood and urine from 7/18 grew E coli. On 7/22, after discussions with pt's family about the risks and benefits of starting anticoagulation, pt was started on Eliquis 2.5 mg bid. Pt's TTE revealed severe AS w/ preserved EF and structural heart team was consulted and recommended workup; however, the family wished to defer workup until pt was completely recovered from bacteremic infection and eventually deferred to outpatient follow up with structural cardiology. On 7/23, pt received bladder scans which were negative for retention. On 7/24, pt was status quo and antitbiotics were continued. Since patient's SBPs went above 170, pt was started on Amlodipine 5 mg daily as her home olmesartan was held in setting of presumed LIAM.     Patient completed her antibiotic course 7/28/22. Patient is now HD stable and ready to be discharged home with home PT. She will follow up with structural cardiology for her new severe AS and with cardiology for management of her new onset Afib with RVR. She will be discharged on eliquis and metoprolol. Patient will be discharged with amlodipine and omesartan will be held in setting of presumed LIAM, to be restarted by her PCP outpatient.

## 2022-07-26 NOTE — DISCHARGE NOTE PROVIDER - NPI NUMBER (FOR SYSADMIN USE ONLY) :
[[id number] [[id number]],[[id number]] [[id number]],[[id number]]

## 2022-07-26 NOTE — DISCHARGE NOTE PROVIDER - CARE PROVIDER_API CALL
Latoya Thomas (DO)  Medicine  Geriatric Medicine  [address], [place] [address]  Phone: [phone]  Fax: [phone]  Established Patient  Follow Up Time: 1 week   Latoya Thomas (DO)  Medicine  Geriatric Medicine  [address], [place], [place] [address]  Phone: [phone]  Fax: [phone]  Established Patient  Follow Up Time: 1 week    Rodger Wharton (DO)  Cardiovascular Disease; Internal Medicine; Nuclear Cardiology  [address], [address]  [place] [address]  Phone: [phone]  Fax: [phone]  Established Patient  Follow Up Time: 1 week    Luciano Fischer)  Cardiology; Internal Medicine; Interventional Cardiology  [address] [place] [address]  Phone: [phone]  Fax: [phone]  Established Patient  Follow Up Time: 1 week   Latoya Thomas (DO)  Medicine  Geriatric Medicine  [address], [facility]  [place], [place] [id number]  Phone: [phone]  Fax: [phone]  Established Patient  Follow Up Time: 1 week    Rodger Wharton (DO)  Cardiovascular Disease; Internal Medicine; Nuclear Cardiology  [address], [address]  [place] [address]  Phone: [phone]  Fax: [phone]  Established Patient  Follow Up Time: 1 week    Luciano Fischer)  Cardiology; Internal Medicine; Interventional Cardiology  [address] [place] [address]  Phone: [phone]  Fax: [phone]  Established Patient  Follow Up Time: 1 week    Abdirashid Shea)  Orthopedics  [address] [place] [address]  Phone: [phone]  Fax: [phone]  Established Patient  Scheduled Appointment: 08/04/2022 01:15 AM

## 2022-07-26 NOTE — PROGRESS NOTE ADULT - PROBLEM SELECTOR PLAN 1
Fever, leukocytosis (WBC of 12.8), hypotension, tachycardia, Lactate of 1.7 on admission to 1.2 on 7/22.   - Blood culture and urine cultures positive for E. coli  - on IV ceftriaxone 2g. adjusted to current regimen on 7/22, goal for 7 days of therapy, last day of therapy is 7/28  - f/u Bcx for final results.   - trend cbc, monitor vital signs for infection, monitor temperature curve  - elevated d-dimer down trending, (1700 -->1100), V/Q scan negative Fever, leukocytosis (WBC of 12.8), hypotension, tachycardia, Lactate of 1.7 on admission to 1.2 on 7/22.   - Blood culture and urine cultures positive for E. coli  - on IV ceftriaxone 2g adjusted to current regimen on 7/22, goal for 7 days of therapy, last day of therapy is 7/28  - f/u Bcx for final results  - trend cbc, monitor vital signs for infection, monitor temperature curve  - elevated d-dimer down trending, (1700 -->1100), V/Q scan negative

## 2022-07-26 NOTE — DISCHARGE NOTE PROVIDER - NSDCHHCONTRAHOMEOTHER_GEN_ALL_CORE_FT
Tidwell placed as she had >600cc PVR. Appreciate urology recs. Likely 2/2 anesthesia.   Fall risk, unsteady on feet.

## 2022-07-26 NOTE — DISCHARGE NOTE PROVIDER - PROVIDER TOKENS
PROVIDER:[TOKEN:[[id number]],FOLLOWUP:[1 week],ESTABLISHEDPATIENT:[T]] PROVIDER:[TOKEN:[[id number]],FOLLOWUP:[1 week],ESTABLISHEDPATIENT:[T]],PROVIDER:[TOKEN:[[id number]],FOLLOWUP:[1 week],ESTABLISHEDPATIENT:[T]],PROVIDER:[TOKEN:[[id number]],FOLLOWUP:[1 week],ESTABLISHEDPATIENT:[T]] PROVIDER:[TOKEN:[[id number]],FOLLOWUP:[1 week],ESTABLISHEDPATIENT:[T]],PROVIDER:[TOKEN:[[id number]],FOLLOWUP:[1 week],ESTABLISHEDPATIENT:[T]],PROVIDER:[[id number]],FOLLOWUP:[1 week],ESTABLISHEDPATIENT:[T]],PROVIDER:[TOKEN:[[id number]],SCHEDULEDAPPT:[08/04/2022],SCHEDULEDAPPTTIME:[01:15 AM],ESTABLISHEDPATIENT:[T]]

## 2022-07-26 NOTE — PROGRESS NOTE ADULT - PROBLEM SELECTOR PLAN 2
- on Eliquis 2.5 mg BID (started 7/22), Attending Dr. Diaz discussed risks/benefits of anticoagulation with family and family agrees.   -will continue 25mg PO lopressor BID with hold parameters  -CHADVASC  4points, 4.8%, HASBLED 2 points, 4.1%. Risk benefit discussion with patient's son and daughter in law at bedside.  -HD stable - on Eliquis 2.5 mg BID (started 7/22), Attending Dr. Diaz discussed risks/benefits of anticoagulation with family and family agrees.   -will continue 25mg PO lopressor BID with hold parameters  -CHADVASC  4points, 4.8%, HASBLED 2 points, 4.1%. Risk benefit discussion with patient's son and daughter in law at bedside  -HD stable

## 2022-07-26 NOTE — PROGRESS NOTE ADULT - ATTENDING COMMENTS
89F w/ hx of HTN, IBS, osteoporosis, anemia, recent admission for R shoulder dislocation and Hill-Sachs lesion/ fracture due to fall and discharged to rehab facility, presents from rehab facility for sepsis secondary to UTI which are in the setting of anemia and LIAM. Found to have E coli in urine and blood cultures on 2 g IV Ceftriaxone and clinical signs of infection are currently improving, course complicated by new onset Afib w/ RVR, now rate is controlled currently on lopressor 25 mg BID with HR now predominantly in the 60-80's.  Cont with Eliquis as per cardio.  She was found to have severe aortic stenosis w/ preserved EF on TTE (7/21) Structural heart team consulted due to Aortic stenosis and the family is not amenable for surgery currently and as per structural heart team , patient will need repeat of TTE in 3 months and decision will be made based on TTE repeat.  Granddaughter was at the bedside and I have updated her .  Norvasc is started for BP control as she was on [facility]   hospitalist   .

## 2022-07-26 NOTE — DISCHARGE NOTE PROVIDER - NSDCMRMEDTOKEN_GEN_ALL_CORE_FT
Benicar 40 mg oral tablet: 1 tab(s) orally once a day  calcium carbonate 500 mg (200 mg elemental calcium) oral tablet, chewable: 1 tab(s) orally once a day  lidocaine 4% patch: Apply topically to affected area once a day to R shoulder  Trelegy Ellipta 200 mcg-62.5 mcg-25 mcg/inh inhalation powder: 1 puff(s) inhaled once a day   amLODIPine 5 mg oral tablet: 1 tab(s) orally once a day  apixaban 2.5 mg oral tablet: 1 tab(s) orally every 12 hours  Benicar 40 mg oral tablet: 1 tab(s) orally once a day  calcium carbonate 500 mg (200 mg elemental calcium) oral tablet, chewable: 1 tab(s) orally once a day  lidocaine 4% patch: Apply topically to affected area once a day to R shoulder  metoprolol tartrate 25 mg oral tablet: 1 tab(s) orally 2 times a day  polyethylene glycol 3350 oral powder for reconstitution: 17 gram(s) orally once a day  Trelegy Ellipta 200 mcg-62.5 mcg-25 mcg/inh inhalation powder: 1 puff(s) inhaled once a day   amLODIPine 5 mg oral tablet: 1 tab(s) orally once a day  apixaban 2.5 mg oral tablet: 1 tab(s) orally every 12 hours  calcium carbonate 500 mg (200 mg elemental calcium) oral tablet, chewable: 1 tab(s) orally once a day  lidocaine 4% patch: Apply topically to affected area once a day to R shoulder  metoprolol tartrate 25 mg oral tablet: 1 tab(s) orally 2 times a day  polyethylene glycol 3350 oral powder for reconstitution: 17 gram(s) orally once a day  Trelegy Ellipta 200 mcg-62.5 mcg-25 mcg/inh inhalation powder: 1 puff(s) inhaled once a day

## 2022-07-26 NOTE — DISCHARGE NOTE PROVIDER - NSDCCPCAREPLAN_GEN_ALL_CORE_FT
PRINCIPAL DISCHARGE DIAGNOSIS  Diagnosis: Sepsis  Assessment and Plan of Treatment: You had a severe infection when you came from the hospital. You had a bactereial infection in the blood with a bactereia called E coli.  We gave you antibiotics for your infection, which resulted in improvement of your infection. The infection in the blood likely came from another infection that you had in your bladder. It is important to take your antibiotic medication as prescribed. If in the future you have fever, low blood pressure, or increasing requirement for oxygen then please go to the emergency department.      SECONDARY DISCHARGE DIAGNOSES  Diagnosis: Pneumonia  Assessment and Plan of Treatment:     Diagnosis: Acute UTI  Assessment and Plan of Treatment: You had a bladder infection with a bacteria called E coli. We gave you antibiotics for this and your urinary symptoms improved. If in the future your have discomfort or pain when urinating, or difficulty urinating then please contact your primary care physician.     PRINCIPAL DISCHARGE DIAGNOSIS  Diagnosis: Sepsis  Assessment and Plan of Treatment: You had a severe infection when you came from the hospital. You had a bactereial infection in the blood with a bactereia called E coli.  We gave you antibiotics for your infection, which resulted in improvement of your infection. The infection in the blood likely came from another infection that you had in your bladder. It is important to take your antibiotic medication as prescribed. Please follow up with an infectious disease doctor within 1 week. If in the future you have fever, low blood pressure, or increasing requirement for oxygen then please go to the emergency department.      SECONDARY DISCHARGE DIAGNOSES  Diagnosis: New onset atrial fibrillation  Assessment and Plan of Treatment: You were recently diagnosed with new onset atrial fibrillation with RVR. We gave you medications for this and it is important that you take Eliquis and your blood pressure medication and follow up with your cardiologist in 1 week after leaving the hospital. If you start to experience chest pain or have palpitations, please go to the emergency department.    Diagnosis: Acute UTI  Assessment and Plan of Treatment: You had a bladder infection with a bacteria called E coli. We gave you antibiotics for this and you finished your antibiotics today with an improvement in your urinary symptoms. If in the future your have discomfort or pain when urinating, or difficulty urinating then please contact your primary care physician. If you have a fever or worsening symptoms, please go to the emergency department.

## 2022-07-26 NOTE — PROGRESS NOTE ADULT - PROBLEM SELECTOR PLAN 4
Suprapubic pain, leukocytosis on presentation.   UA with LE and bacteria  - Urine culture and Bcx positive for E. coli  - s/p zosyn, narrowed to ceftriaxone 7/21 based on sensitivities  - last day of ceftriaxone therapy on 7/28 for concomitant bacteremia. Suprapubic pain, leukocytosis on presentation.   UA with LE and bacteria  - Urine culture and Bcx positive for E. coli  - s/p zosyn, narrowed to ceftriaxone 7/21 based on sensitivities  - last day of ceftriaxone therapy on 7/28 for concomitant bacteremia

## 2022-07-27 LAB
ALBUMIN SERPL ELPH-MCNC: 3.3 G/DL — SIGNIFICANT CHANGE UP (ref 3.3–5)
ALP SERPL-CCNC: 94 U/L — SIGNIFICANT CHANGE UP (ref 40–120)
ALT FLD-CCNC: 40 U/L — SIGNIFICANT CHANGE UP (ref 10–45)
ANION GAP SERPL CALC-SCNC: 12 MMOL/L — SIGNIFICANT CHANGE UP (ref 5–17)
AST SERPL-CCNC: 28 U/L — SIGNIFICANT CHANGE UP (ref 10–40)
BASOPHILS # BLD AUTO: 0.07 K/UL — SIGNIFICANT CHANGE UP (ref 0–0.2)
BASOPHILS NFR BLD AUTO: 0.7 % — SIGNIFICANT CHANGE UP (ref 0–2)
BILIRUB SERPL-MCNC: 0.2 MG/DL — SIGNIFICANT CHANGE UP (ref 0.2–1.2)
BUN SERPL-MCNC: 24 MG/DL — HIGH (ref 7–23)
CALCIUM SERPL-MCNC: 10 MG/DL — SIGNIFICANT CHANGE UP (ref 8.4–10.5)
CHLORIDE SERPL-SCNC: 94 MMOL/L — LOW (ref 96–108)
CO2 SERPL-SCNC: 28 MMOL/L — SIGNIFICANT CHANGE UP (ref 22–31)
CREAT SERPL-MCNC: 1.56 MG/DL — HIGH (ref 0.5–1.3)
CULTURE RESULTS: SIGNIFICANT CHANGE UP
CULTURE RESULTS: SIGNIFICANT CHANGE UP
EGFR: 31 ML/MIN/1.73M2 — LOW
EOSINOPHIL # BLD AUTO: 0.33 K/UL — SIGNIFICANT CHANGE UP (ref 0–0.5)
EOSINOPHIL NFR BLD AUTO: 3.2 % — SIGNIFICANT CHANGE UP (ref 0–6)
GLUCOSE SERPL-MCNC: 102 MG/DL — HIGH (ref 70–99)
HCT VFR BLD CALC: 32.5 % — LOW (ref 34.5–45)
HGB BLD-MCNC: 10.6 G/DL — LOW (ref 11.5–15.5)
IMM GRANULOCYTES NFR BLD AUTO: 3.8 % — HIGH (ref 0–1.5)
LYMPHOCYTES # BLD AUTO: 1.79 K/UL — SIGNIFICANT CHANGE UP (ref 1–3.3)
LYMPHOCYTES # BLD AUTO: 17.4 % — SIGNIFICANT CHANGE UP (ref 13–44)
MAGNESIUM SERPL-MCNC: 1.9 MG/DL — SIGNIFICANT CHANGE UP (ref 1.6–2.6)
MCHC RBC-ENTMCNC: 30.1 PG — SIGNIFICANT CHANGE UP (ref 27–34)
MCHC RBC-ENTMCNC: 32.6 GM/DL — SIGNIFICANT CHANGE UP (ref 32–36)
MCV RBC AUTO: 92.3 FL — SIGNIFICANT CHANGE UP (ref 80–100)
MONOCYTES # BLD AUTO: 0.72 K/UL — SIGNIFICANT CHANGE UP (ref 0–0.9)
MONOCYTES NFR BLD AUTO: 7 % — SIGNIFICANT CHANGE UP (ref 2–14)
NEUTROPHILS # BLD AUTO: 7 K/UL — SIGNIFICANT CHANGE UP (ref 1.8–7.4)
NEUTROPHILS NFR BLD AUTO: 67.9 % — SIGNIFICANT CHANGE UP (ref 43–77)
NRBC # BLD: 0 /100 WBCS — SIGNIFICANT CHANGE UP (ref 0–0)
PHOSPHATE SERPL-MCNC: 4.8 MG/DL — HIGH (ref 2.5–4.5)
PLATELET # BLD AUTO: 418 K/UL — HIGH (ref 150–400)
POTASSIUM SERPL-MCNC: 4.7 MMOL/L — SIGNIFICANT CHANGE UP (ref 3.5–5.3)
POTASSIUM SERPL-SCNC: 4.7 MMOL/L — SIGNIFICANT CHANGE UP (ref 3.5–5.3)
PROT SERPL-MCNC: 7.1 G/DL — SIGNIFICANT CHANGE UP (ref 6–8.3)
RBC # BLD: 3.52 M/UL — LOW (ref 3.8–5.2)
RBC # FLD: 14.3 % — SIGNIFICANT CHANGE UP (ref 10.3–14.5)
SODIUM SERPL-SCNC: 134 MMOL/L — LOW (ref 135–145)
SPECIMEN SOURCE: SIGNIFICANT CHANGE UP
SPECIMEN SOURCE: SIGNIFICANT CHANGE UP
WBC # BLD: 10.3 K/UL — SIGNIFICANT CHANGE UP (ref 3.8–10.5)
WBC # FLD AUTO: 10.3 K/UL — SIGNIFICANT CHANGE UP (ref 3.8–10.5)

## 2022-07-27 PROCEDURE — 99232 SBSQ HOSP IP/OBS MODERATE 35: CPT | Mod: GC

## 2022-07-27 RX ORDER — METOPROLOL TARTRATE 50 MG
1 TABLET ORAL
Qty: 60 | Refills: 0
Start: 2022-07-27 | End: 2022-08-25

## 2022-07-27 RX ORDER — APIXABAN 2.5 MG/1
1 TABLET, FILM COATED ORAL
Qty: 0 | Refills: 0 | DISCHARGE
Start: 2022-07-27

## 2022-07-27 RX ORDER — METOPROLOL TARTRATE 50 MG
1 TABLET ORAL
Qty: 0 | Refills: 0 | DISCHARGE
Start: 2022-07-27

## 2022-07-27 RX ORDER — AMLODIPINE BESYLATE 2.5 MG/1
1 TABLET ORAL
Qty: 30 | Refills: 0
Start: 2022-07-27 | End: 2022-08-25

## 2022-07-27 RX ORDER — APIXABAN 2.5 MG/1
1 TABLET, FILM COATED ORAL
Qty: 60 | Refills: 0
Start: 2022-07-27 | End: 2022-08-25

## 2022-07-27 RX ORDER — POLYETHYLENE GLYCOL 3350 17 G/17G
17 POWDER, FOR SOLUTION ORAL
Qty: 0 | Refills: 0 | DISCHARGE
Start: 2022-07-27

## 2022-07-27 RX ORDER — AMLODIPINE BESYLATE 2.5 MG/1
1 TABLET ORAL
Qty: 0 | Refills: 0 | DISCHARGE
Start: 2022-07-27

## 2022-07-27 RX ADMIN — CEFTRIAXONE 100 MILLIGRAM(S): 500 INJECTION, POWDER, FOR SOLUTION INTRAMUSCULAR; INTRAVENOUS at 11:37

## 2022-07-27 RX ADMIN — Medication 25 MILLIGRAM(S): at 11:10

## 2022-07-27 RX ADMIN — Medication 3 MILLILITER(S): at 11:10

## 2022-07-27 RX ADMIN — APIXABAN 2.5 MILLIGRAM(S): 2.5 TABLET, FILM COATED ORAL at 05:14

## 2022-07-27 RX ADMIN — Medication 25 MILLIGRAM(S): at 22:10

## 2022-07-27 RX ADMIN — APIXABAN 2.5 MILLIGRAM(S): 2.5 TABLET, FILM COATED ORAL at 17:37

## 2022-07-27 RX ADMIN — AMLODIPINE BESYLATE 5 MILLIGRAM(S): 2.5 TABLET ORAL at 05:14

## 2022-07-27 NOTE — PROGRESS NOTE ADULT - PROBLEM SELECTOR PLAN 2
- on Eliquis 2.5 mg BID (started 7/22), Attending Dr. Diaz discussed risks/benefits of anticoagulation with family and family agrees.   -will continue 25mg PO lopressor BID with hold parameters  -CHADVASC  4points, 4.8%, HASBLED 2 points, 4.1%. Risk benefit discussion with patient's son and daughter in law at bedside  -HD stable

## 2022-07-27 NOTE — PROGRESS NOTE ADULT - PROBLEM SELECTOR PLAN 5
- Severe aortic stenosis w/ preserved EF found on TTE 7/21  - Structural heart team consulted  - 7/25: patient's son declined workup for cardiac CT and cardiac catheterization and will f/u with structural cardiology o/p

## 2022-07-27 NOTE — PROGRESS NOTE ADULT - PROBLEM SELECTOR PLAN 4
Suprapubic pain, leukocytosis on presentation.   UA with LE and bacteria  - Urine culture and Bcx positive for E. coli  - s/p zosyn, narrowed to ceftriaxone 7/21 based on sensitivities  - last day of ceftriaxone therapy on 7/28 for concomitant bacteremia

## 2022-07-27 NOTE — PROGRESS NOTE ADULT - PROBLEM SELECTOR PLAN 1
Fever, leukocytosis (WBC of 12.8), hypotension, tachycardia, Lactate of 1.7 on admission to 1.2 on 7/22.   - Blood culture and urine cultures positive for E. coli  - on IV ceftriaxone 2g adjusted to current regimen on 7/22, goal for 7 days of therapy, last day of therapy is 7/28  - f/u Bcx for final results  - trend cbc, monitor vital signs for infection, monitor temperature curve  - elevated d-dimer down trending, (1700 -->1100), V/Q scan negative Fever, leukocytosis (WBC of 12.8), hypotension, tachycardia, Lactate of 1.7 on admission to 1.2 on 7/22.   - Blood culture and urine cultures positive for E. coli  - on IV ceftriaxone 2g adjusted to current regimen on 7/22, goal for 7 days of therapy, last day 7/28  - trend cbc, monitor vital signs for infection, monitor temperature curve  - elevated d-dimer down trending, (1700 -->1100), V/Q scan negative

## 2022-07-27 NOTE — PROGRESS NOTE ADULT - ASSESSMENT
89F w/ hx of HTN, IBS, osteoporosis, anemia, recent admission for R shoulder dislocation and Hill-Sachs lesion/ fracture due to fall and discharged to rehab facility, presents from rehab facility for sepsis secondary to UTI which are in the setting of anemia and LIAM. Found to have E coli in urine and blood cultures on 2 g IV Ceftriaxone and clinical signs of infection are currently improving, course complicated by new onset Afib w/ RVR, currently on lopressor 25 mg BID with HR now predominantly in the 60-80's. Found to have severe aortic stenosis w/ preserved EF on TTE (7/21) Structural heart team consulted and subsequent workup declined for cardiac cath and CT s/p family discussion.  89F w/ hx of HTN, IBS, osteoporosis, anemia, recent admission for R shoulder dislocation and Hill-Sachs lesion/ fracture due to fall and discharged to rehab facility, presents from rehab facility for sepsis secondary to UTI which are in the setting of anemia and LIAM. Found to have E coli in urine and blood cultures on 2 g IV Ceftriaxone and clinical signs of infection are currently improving, course complicated by new onset Afib w/ RVR, currently on lopressor 25 mg BID with HR now predominantly in the 60-80's. Found to have severe aortic stenosis w/ preserved EF on TTE (7/21) Structural heart team consulted and subsequent workup declined for cardiac cath and CT s/p family discussion.

## 2022-07-27 NOTE — CHART NOTE - NSCHARTNOTEFT_GEN_A_CORE
Patient will require a transport wheelchair due to. The beneficiary has a mobility limitation that significantly impairs her ability to participate in one or more MRADLs such as toileting, feeding, dressing, grooming, and bathing in customary locations in the home. patient's mobility limitation cannot be sufficiently resolved by the use of an appropriately fitted cane or walker. Patient is unable to ambulate with a walker. Use of a manual wheelchair will significantly improve the beneficiary's ability to participate in MRADLs and the beneficiary will use it on a regular basis in the home. Beneficiary is able and willing to use the wheelchair in the home. Beneficiary has a caregiver who is available, willing and able to provide assistance with the wheelchair. Patient is not able to self propel a standard or lightweight wheelchair. Patient will require a transport wheelchair due to mobility limitation. The beneficiary has a mobility limitation that significantly impairs her ability to participate in one or more MRADLs such as toileting, feeding, dressing, grooming, and bathing in customary locations in the home. patient's mobility limitation cannot be sufficiently resolved by the use of an appropriately fitted cane or walker. Patient is unable to ambulate with a walker. Use of a manual wheelchair will significantly improve the beneficiary's ability to participate in MRADLs and the beneficiary will use it on a regular basis in the home. Beneficiary is able and willing to use the wheelchair in the home. Beneficiary has a caregiver who is available, willing and able to provide assistance with the wheelchair. Patient is not able to self propel a standard or lightweight wheelchair.

## 2022-07-27 NOTE — PROGRESS NOTE ADULT - ATTENDING COMMENTS
89F w/ hx of HTN, IBS, osteoporosis, anemia, recent admission for R shoulder dislocation and Hill-Sachs lesion/ fracture due to fall and discharged to rehab facility, presents from rehab facility for sepsis secondary to UTI which are in the setting of anemia and LIAM. Found to have E coli in urine and blood cultures on 2 g IV Ceftriaxone and clinical signs of infection are currently improving, course complicated by new onset Afib w/ RVR, now rate is controlled currently on lopressor 25 mg BID with HR now predominantly in the 60-80's.  Cont with Eliquis as per cardio.  She was found to have severe aortic stenosis w/ preserved EF on TTE (7/21) Structural heart team consulted due to Aortic stenosis and the family is not amenable for surgery currently and as per structural heart team , patient will need repeat of TTE in 3 months and decision will be made based on TTE repeat.  [facility]   .

## 2022-07-27 NOTE — PROGRESS NOTE ADULT - SUBJECTIVE AND OBJECTIVE BOX
***************************************************************  Faye Luz MD (PGY-1)  Internal Medicine  Pager: [phone]  ***************************************************************    PROGRESS NOTE:     Patient is a 89y old  Female who presents with a chief complaint of Fever, cough, confusion (26 Jul 2022 11:45)      SUBJECTIVE / OVERNIGHT EVENTS: Patient seen and examined at bedside. No acute overnight events. This morning, the patient is comfortable and doing well. No acute complaints. Denies fevers, chills, N/V/D, chest pain, SOB, abdominal pain.    MEDICATIONS  (STANDING):  albuterol/ipratropium for Nebulization 3 milliLiter(s) Nebulizer every 6 hours  amLODIPine   Tablet 5 milliGRAM(s) Oral daily  apixaban 2.5 milliGRAM(s) Oral every 12 hours  calcium carbonate    500 mG (Tums) Chewable 2 Tablet(s) Chew at bedtime  cefTRIAXone   IVPB 2000 milliGRAM(s) IV Intermittent every 24 hours  lidocaine   4% Patch 1 Patch Transdermal every 24 hours  metoprolol tartrate 25 milliGRAM(s) Oral two times a day  polyethylene glycol 3350 17 Gram(s) Oral daily    MEDICATIONS  (PRN):  acetaminophen     Tablet .. 650 milliGRAM(s) Oral every 6 hours PRN Mild Pain (1 - 3), Moderate Pain (4 - 6)  aluminum hydroxide/magnesium hydroxide/simethicone Suspension 30 milliLiter(s) Oral every 6 hours PRN Dyspepsia  guaiFENesin Oral Liquid (Sugar-Free) 200 milliGRAM(s) Oral every 6 hours PRN Cough      CAPILLARY BLOOD GLUCOSE        I&O's Summary    26 Jul 2022 07:01  -  27 Jul 2022 07:00  --------------------------------------------------------  IN: 770 mL / OUT: 750 mL / NET: 20 mL        PHYSICAL EXAM:  Vital Signs Last 24 Hrs  T(C): 37.2 (27 Jul 2022 04:53), Max: 37.3 (26 Jul 2022 12:16)  T(F): 98.9 (27 Jul 2022 04:53), Max: 99.2 (26 Jul 2022 16:51)  HR: 71 (27 Jul 2022 04:53) (70 - 88)  BP: 145/84 (27 Jul 2022 04:53) (110/60 - 171/90)  BP(mean): --  RR: 18 (27 Jul 2022 04:53) (16 - 18)  SpO2: 95% (27 Jul 2022 04:53) (95% - 97%)    Parameters below as of 27 Jul 2022 04:53  Patient On (Oxygen Delivery Method): room air        GENERAL: NAD, lying in bed comfortably  HEAD: Atraumatic, normocephalic  EYES: EOMI, PERRLA, conjunctiva and sclera clear  ENT: Moist mucous membranes  NECK: Supple, no JVD  HEART: S1, S2, Regular rate and rhythm, no murmurs, rubs, or gallops  LUNGS: Unlabored respirations, clear to auscultation bilaterally, no crackles, wheezing, or rhonchi  ABDOMEN: Soft, nontender, nondistended, +BS  EXTREMITIES: 2+ peripheral pulses bilaterally. No clubbing, cyanosis, or edema  NERVOUS SYSTEM:  A&Ox3, no focal deficits   SKIN: No rashes or lesions    LABS:                        10.7   10.76 )-----------( 411      ( 26 Jul 2022 07:20 )             32.9     07-26    134<L>  |  94<L>  |  22  ----------------------------<  106<H>  4.9   |  27  |  1.56<H>    Ca    10.2      26 Jul 2022 07:20  Phos  4.6     07-26  Mg     1.9     07-26    TPro  6.9  /  Alb  3.1<L>  /  TBili  0.3  /  DBili  x   /  AST  25  /  ALT  41  /  AlkPhos  106  07-26                RADIOLOGY & ADDITIONAL TESTS:  Results Reviewed:   Imaging Personally Reviewed:  Electrocardiogram Personally Reviewed:    COORDINATION OF CARE:  Care Discussed with Consultants/Other Providers [Y/N]:  Prior or Outpatient Records Reviewed [Y/N]:   ***************************************************************  Faye Luz MD (PGY-1)  Internal Medicine  Pager: [phone]  ***************************************************************    PROGRESS NOTE:     Patient is a 89y old  Female who presents with a chief complaint of Fever, cough, confusion (26 Jul 2022 11:45)      SUBJECTIVE / OVERNIGHT EVENTS: Patient seen and examined at bedside. No acute overnight events. No acute complaints. Denies HA, N/V, chest pain, SOB, and dizziness.    MEDICATIONS  (STANDING):  albuterol/ipratropium for Nebulization 3 milliLiter(s) Nebulizer every 6 hours  amLODIPine   Tablet 5 milliGRAM(s) Oral daily  apixaban 2.5 milliGRAM(s) Oral every 12 hours  calcium carbonate    500 mG (Tums) Chewable 2 Tablet(s) Chew at bedtime  cefTRIAXone   IVPB 2000 milliGRAM(s) IV Intermittent every 24 hours  lidocaine   4% Patch 1 Patch Transdermal every 24 hours  metoprolol tartrate 25 milliGRAM(s) Oral two times a day  polyethylene glycol 3350 17 Gram(s) Oral daily    MEDICATIONS  (PRN):  acetaminophen     Tablet .. 650 milliGRAM(s) Oral every 6 hours PRN Mild Pain (1 - 3), Moderate Pain (4 - 6)  aluminum hydroxide/magnesium hydroxide/simethicone Suspension 30 milliLiter(s) Oral every 6 hours PRN Dyspepsia  guaiFENesin Oral Liquid (Sugar-Free) 200 milliGRAM(s) Oral every 6 hours PRN Cough      CAPILLARY BLOOD GLUCOSE        I&O's Summary    26 Jul 2022 07:01  -  27 Jul 2022 07:00  --------------------------------------------------------  IN: 770 mL / OUT: 750 mL / NET: 20 mL        PHYSICAL EXAM:  Vital Signs Last 24 Hrs  T(C): 37.2 (27 Jul 2022 04:53), Max: 37.3 (26 Jul 2022 12:16)  T(F): 98.9 (27 Jul 2022 04:53), Max: 99.2 (26 Jul 2022 16:51)  HR: 71 (27 Jul 2022 04:53) (70 - 88)  BP: 145/84 (27 Jul 2022 04:53) (110/60 - 171/90)  BP(mean): --  RR: 18 (27 Jul 2022 04:53) (16 - 18)  SpO2: 95% (27 Jul 2022 04:53) (95% - 97%)    Parameters below as of 27 Jul 2022 04:53  Patient On (Oxygen Delivery Method): room air      Constitutional: NAD  HEENT: NC/AT, MMM  Respiratory: CTA b/l, good air entry b/l, no wheezing, no rhonchi, no rales  Cardiovascular: RRR, normal S1S2  Gastrointestinal: soft, NTND, no masses palpable  Extremities: No edema appreciated on b/l lower extremities  Neurological: AAOx3  Skin: Normal temperature, warm, dry, diffuse senile ecchymosis on b/l upper extremities.     LABS:                        10.7   10.76 )-----------( 411      ( 26 Jul 2022 07:20 )             32.9     07-26    134<L>  |  94<L>  |  22  ----------------------------<  106<H>  4.9   |  27  |  1.56<H>    Ca    10.2      26 Jul 2022 07:20  Phos  4.6     07-26  Mg     1.9     07-26    TPro  6.9  /  Alb  3.1<L>  /  TBili  0.3  /  DBili  x   /  AST  25  /  ALT  41  /  AlkPhos  106  07-26                RADIOLOGY & ADDITIONAL TESTS:  Results Reviewed:   Imaging Personally Reviewed:  Electrocardiogram Personally Reviewed:    COORDINATION OF CARE:  Care Discussed with Consultants/Other Providers [Y/N]:  Prior or Outpatient Records Reviewed [Y/N]:

## 2022-07-27 NOTE — PROGRESS NOTE ADULT - SUBJECTIVE AND OBJECTIVE BOX
DATE OF SERVICE: 07-27-22 @ 12:00    Patient is a 89y old  Female who presents with a chief complaint of Fever, cough, confusion (27 Jul 2022 07:32)      INTERVAL HISTORY: Feels ok. Niece at bedside.     REVIEW OF SYSTEMS:  CONSTITUTIONAL: No weakness  EYES/ENT: No visual changes;  No throat pain   NECK: No pain or stiffness  RESPIRATORY: No cough, wheezing; No shortness of breath  CARDIOVASCULAR: No chest pain or palpitations  GASTROINTESTINAL: No abdominal  pain. No nausea, vomiting, or hematemesis  GENITOURINARY: No dysuria, frequency or hematuria  NEUROLOGICAL: No stroke like symptoms  SKIN: No rashes    	  MEDICATIONS:  amLODIPine   Tablet 5 milliGRAM(s) Oral daily  metoprolol tartrate 25 milliGRAM(s) Oral two times a day        PHYSICAL EXAM:  T(C): 37.2 (07-27-22 @ 04:53), Max: 37.3 (07-26-22 @ 12:16)  HR: 71 (07-27-22 @ 04:53) (70 - 88)  BP: 145/84 (07-27-22 @ 04:53) (110/60 - 145/84)  RR: 18 (07-27-22 @ 04:53) (16 - 18)  SpO2: 95% (07-27-22 @ 04:53) (95% - 97%)  Wt(kg): --  I&O's Summary    26 Jul 2022 07:01  -  27 Jul 2022 07:00  --------------------------------------------------------  IN: 770 mL / OUT: 750 mL / NET: 20 mL          Appearance: In no distress	  HEENT:    PERRL, EOMI	  Cardiovascular:  S1 S2, No JVD  Respiratory: Lungs clear to auscultation	  Gastrointestinal:  Soft, Non-tender, + BS	  Vascularature:  No edema of LE  Psychiatric: Appropriate affect   Neuro: no acute focal deficits                               10.6   10.30 )-----------( 418      ( 27 Jul 2022 07:26 )             32.5     07-27    134<L>  |  94<L>  |  24<H>  ----------------------------<  102<H>  4.7   |  28  |  1.56<H>    Ca    10.0      27 Jul 2022 07:26  Phos  4.8     07-27  Mg     1.9     07-27    TPro  7.1  /  Alb  3.3  /  TBili  0.2  /  DBili  x   /  AST  28  /  ALT  40  /  AlkPhos  94  07-27        Labs personally reviewed      ASSESSMENT/PLAN: 	      Ms. Hadley is a 89 yo female with PMH of HTN, IBS, osteoporosis, anemia, recent admission fpr fall and R shoulder dislocation and Hill-Sachs lesion/ fracture and discharged to rehab presents for fever, lethargy, altered mental status and reported hypoxia for 2 days found to have e-coli urosepsis and now with new onset AF.    Problem/Plan -1  Problem: New Onset Atrial Fibrillation  - reported overnight with HR in the 150s  - Admission EKG with sinus tach and frequent PVCs  - AF confirmed   - Chadsvasc score 4   - pt on Eliquis 2.5 mg PO BID for AC   - Pt on Metoprolol PO for rate control   - TTE reveals preserved EF with severe AS     Problem/Plan -2  Problem: HTN  - previously on olmesartan  - Cont to hold olmesartan to provide room for rate control medication  - c.w amlodipine and metoprolol    Problem/Plan -3  Problem: Anemia  - CT A/P negative for RP bleed  - CBC stable --> cont to monitor closely and transfuse Hgb< 7    Problem/Plan -4  Problem: E-Coli Bacteremia  - Urine and Blood Cultures + e-coli  - c/w IV Abx   - monitor fever curve, WBC trend    Problem/Plan -5  Problem: Severe AS  - Structural Heart consult appreciated  - As per patient and son's request, no inpatient workup for TAVR at this time  - Will follow up as outpatient      ALTAGRACIA Mcgee-ESTELLE Wharton DO [facility]  Cardiovascular Medicine  [address] [place] [address], [address]  Office: [phone]  Cell: [phone]

## 2022-07-28 ENCOUNTER — TRANSCRIPTION ENCOUNTER (OUTPATIENT)
Age: 87
End: 2022-07-28

## 2022-07-28 VITALS
RESPIRATION RATE: 18 BRPM | DIASTOLIC BLOOD PRESSURE: 74 MMHG | OXYGEN SATURATION: 95 % | TEMPERATURE: 99 F | HEART RATE: 84 BPM | SYSTOLIC BLOOD PRESSURE: 139 MMHG

## 2022-07-28 LAB
ALBUMIN SERPL ELPH-MCNC: 3.4 G/DL — SIGNIFICANT CHANGE UP (ref 3.3–5)
ALP SERPL-CCNC: 91 U/L — SIGNIFICANT CHANGE UP (ref 40–120)
ALT FLD-CCNC: 50 U/L — HIGH (ref 10–45)
ANION GAP SERPL CALC-SCNC: 14 MMOL/L — SIGNIFICANT CHANGE UP (ref 5–17)
AST SERPL-CCNC: 32 U/L — SIGNIFICANT CHANGE UP (ref 10–40)
BASOPHILS # BLD AUTO: 0.08 K/UL — SIGNIFICANT CHANGE UP (ref 0–0.2)
BASOPHILS NFR BLD AUTO: 0.8 % — SIGNIFICANT CHANGE UP (ref 0–2)
BILIRUB SERPL-MCNC: 0.2 MG/DL — SIGNIFICANT CHANGE UP (ref 0.2–1.2)
BUN SERPL-MCNC: 26 MG/DL — HIGH (ref 7–23)
CALCIUM SERPL-MCNC: 9.8 MG/DL — SIGNIFICANT CHANGE UP (ref 8.4–10.5)
CHLORIDE SERPL-SCNC: 94 MMOL/L — LOW (ref 96–108)
CO2 SERPL-SCNC: 26 MMOL/L — SIGNIFICANT CHANGE UP (ref 22–31)
CREAT SERPL-MCNC: 1.5 MG/DL — HIGH (ref 0.5–1.3)
EGFR: 33 ML/MIN/1.73M2 — LOW
EOSINOPHIL # BLD AUTO: 0.28 K/UL — SIGNIFICANT CHANGE UP (ref 0–0.5)
EOSINOPHIL NFR BLD AUTO: 3 % — SIGNIFICANT CHANGE UP (ref 0–6)
GLUCOSE SERPL-MCNC: 102 MG/DL — HIGH (ref 70–99)
HCT VFR BLD CALC: 31.8 % — LOW (ref 34.5–45)
HGB BLD-MCNC: 10.4 G/DL — LOW (ref 11.5–15.5)
IMM GRANULOCYTES NFR BLD AUTO: 2.2 % — HIGH (ref 0–1.5)
LYMPHOCYTES # BLD AUTO: 1.98 K/UL — SIGNIFICANT CHANGE UP (ref 1–3.3)
LYMPHOCYTES # BLD AUTO: 20.9 % — SIGNIFICANT CHANGE UP (ref 13–44)
MAGNESIUM SERPL-MCNC: 2 MG/DL — SIGNIFICANT CHANGE UP (ref 1.6–2.6)
MCHC RBC-ENTMCNC: 30.2 PG — SIGNIFICANT CHANGE UP (ref 27–34)
MCHC RBC-ENTMCNC: 32.7 GM/DL — SIGNIFICANT CHANGE UP (ref 32–36)
MCV RBC AUTO: 92.4 FL — SIGNIFICANT CHANGE UP (ref 80–100)
MONOCYTES # BLD AUTO: 0.66 K/UL — SIGNIFICANT CHANGE UP (ref 0–0.9)
MONOCYTES NFR BLD AUTO: 7 % — SIGNIFICANT CHANGE UP (ref 2–14)
NEUTROPHILS # BLD AUTO: 6.26 K/UL — SIGNIFICANT CHANGE UP (ref 1.8–7.4)
NEUTROPHILS NFR BLD AUTO: 66.1 % — SIGNIFICANT CHANGE UP (ref 43–77)
NRBC # BLD: 0 /100 WBCS — SIGNIFICANT CHANGE UP (ref 0–0)
PHOSPHATE SERPL-MCNC: 4.4 MG/DL — SIGNIFICANT CHANGE UP (ref 2.5–4.5)
PLATELET # BLD AUTO: 380 K/UL — SIGNIFICANT CHANGE UP (ref 150–400)
POTASSIUM SERPL-MCNC: 4.5 MMOL/L — SIGNIFICANT CHANGE UP (ref 3.5–5.3)
POTASSIUM SERPL-SCNC: 4.5 MMOL/L — SIGNIFICANT CHANGE UP (ref 3.5–5.3)
PROT SERPL-MCNC: 7 G/DL — SIGNIFICANT CHANGE UP (ref 6–8.3)
RBC # BLD: 3.44 M/UL — LOW (ref 3.8–5.2)
RBC # FLD: 14 % — SIGNIFICANT CHANGE UP (ref 10.3–14.5)
SODIUM SERPL-SCNC: 134 MMOL/L — LOW (ref 135–145)
WBC # BLD: 9.47 K/UL — SIGNIFICANT CHANGE UP (ref 3.8–10.5)
WBC # FLD AUTO: 9.47 K/UL — SIGNIFICANT CHANGE UP (ref 3.8–10.5)

## 2022-07-28 PROCEDURE — 99239 HOSP IP/OBS DSCHRG MGMT >30: CPT | Mod: GC

## 2022-07-28 RX ORDER — APIXABAN 2.5 MG/1
1 TABLET, FILM COATED ORAL
Qty: 60 | Refills: 0
Start: 2022-07-28 | End: 2022-08-26

## 2022-07-28 RX ORDER — AMLODIPINE BESYLATE 2.5 MG/1
1 TABLET ORAL
Qty: 30 | Refills: 0
Start: 2022-07-28 | End: 2022-08-26

## 2022-07-28 RX ORDER — METOPROLOL TARTRATE 50 MG
1 TABLET ORAL
Qty: 60 | Refills: 0
Start: 2022-07-28 | End: 2022-08-26

## 2022-07-28 RX ADMIN — Medication 25 MILLIGRAM(S): at 10:46

## 2022-07-28 RX ADMIN — LIDOCAINE 1 PATCH: 4 CREAM TOPICAL at 09:46

## 2022-07-28 RX ADMIN — LIDOCAINE 1 PATCH: 4 CREAM TOPICAL at 06:11

## 2022-07-28 RX ADMIN — Medication 3 MILLILITER(S): at 06:11

## 2022-07-28 RX ADMIN — APIXABAN 2.5 MILLIGRAM(S): 2.5 TABLET, FILM COATED ORAL at 06:10

## 2022-07-28 RX ADMIN — Medication 3 MILLILITER(S): at 00:44

## 2022-07-28 NOTE — PROGRESS NOTE ADULT - PROBLEM SELECTOR PROBLEM 7
Sepsis with acute hypoxic respiratory failure
Urinary retention
Sepsis with acute hypoxic respiratory failure
Acute metabolic encephalopathy
Sepsis with acute hypoxic respiratory failure
Sepsis with acute hypoxic respiratory failure
Acute metabolic encephalopathy
Sepsis with acute hypoxic respiratory failure
Normocytic anemia
Sepsis with acute hypoxic respiratory failure

## 2022-07-28 NOTE — PROGRESS NOTE ADULT - PROBLEM SELECTOR PLAN 7
Retaining 365cc with wet diaper, reassessed by nursing.  Patient used to be very ambulatory, but is now not as much, likely causing urinary retention.   - straight cath x1  - bladder scans per routine, consider vitale per nursing protocol if needed
Now saturating 97% on 2L NC, wean as tolerated   Patient reportedly O2 sat 80% prior to ER arrival  CXR:  Multiple nodular opacities within the right lung and a single nodular   opacity adjacent to left hilum measuring up to 1.4 cm.  - CT chest demonstrates small pleural effusions b/l, no evidence of pneumonia.  RVP negative.
Confusion reported by son, baseline A&Ox4  Now resolved, patient A&Ox4 in ER.   Likely due to sepsis and/or hypoxia.   - hold off on CT head unless focal deficits or persistent confusion
Now saturating 97% on 2L NC, wean as tolerated   Patient reportedly O2 sat 80% prior to ER arrival  CXR:  Multiple nodular opacities within the right lung and a single nodular   opacity adjacent to left hilum measuring up to 1.4 cm.  - CT chest demonstrates small pleural effusions b/l, no evidence of pneumonia.  RVP negative.
Now saturating 97% on 2L NC, wean as tolerated   Patient reportedly O2 sat 80% prior to ER arrival  CXR:  Multiple nodular opacities within the right lung and a single nodular   opacity adjacent to left hilum measuring up to 1.4 cm.  - CT chest demonstrates small pleural effusions b/l, no evidence of pneumonia.  RVP negative.
Recent admission Hb ~10-11.  Hb 8 on admission, potentially after fluids given dilution  no signs of hemorrhage  - iron studies in AM  - retic in AM  - monitor Hb  - active T&S  - good IV access  - transfuse appropriately to Hb > 7
Now saturating 97% on 2L NC, wean as tolerated   Patient reportedly O2 sat 80% prior to ER arrival  CXR:  Multiple nodular opacities within the right lung and a single nodular   opacity adjacent to left hilum measuring up to 1.4 cm.  - CT chest demonstrates small pleural effusions b/l, no evidence of pneumonia.  RVP negative.
Now saturating 97% on 2L NC, wean as tolerated   Patient reportedly O2 sat 80% prior to ER arrival  CXR:  Multiple nodular opacities within the right lung and a single nodular   opacity adjacent to left hilum measuring up to 1.4 cm.  - CT chest demonstrates small pleural effusions b/l, no evidence of pneumonia.  RVP negative.
Confusion reported by son, baseline A&Ox4  Now resolved, patient A&Ox4 in ER.   Likely due to sepsis and/or hypoxia.   - hold off on CT head unless focal deficits or persistent confusion
Now saturating 97% on 2L NC, wean as tolerated   Patient reportedly O2 sat 80% prior to ER arrival  CXR:  Multiple nodular opacities within the right lung and a single nodular   opacity adjacent to left hilum measuring up to 1.4 cm.  - CT chest demonstrates small pleural effusions b/l, no evidence of pneumonia.  RVP negative.

## 2022-07-28 NOTE — PROGRESS NOTE ADULT - PROBLEM SELECTOR PLAN 3
Recent admission Hb ~10-11.  Hb of 9 post-transfusion on admission, potentially after fluids given dilution  - elevated d-dimer (>1000), f/u on hemolytic workup labs.   no signs of hemorrhage on CT abdomen pelvis 7/19 - no evidence of retroperitoneal hematoma.   - monitor Hb  - active T&S  - good IV access  - transfuse appropriately to Hb > 7
Recent admission Hb ~10-11.  Hb of 9 post-transfusion on admission, potentially after fluids given dilution  - elevated d-dimer (>1000), f/u on hemolytic workup labs.   no signs of hemorrhage on CT abdomen pelvis 7/19 - no evidence of retroperitoneal hematoma.   - monitor Hb  - active T&S  - good IV access  - transfuse appropriately to Hb > 7
Possible Aspiration  with hypoxia. Leukocytosis  CXR w PNA findings: Multiple nodular opacities within the right lung and a single nodular   opacity adjacent to left hilum measuring up to 1.4 cm.  RVP negative  - CT chest noncontrast pending.   - C/w zosyn renally adjusted cover for UTI and PNA   - Procalcitonin of 7.24
Recent admission Hb ~10-11.  Hb of 9 post-transfusion on admission, potentially after fluids given dilution  - elevated d-dimer (>1000), f/u on hemolytic workup labs.   no signs of hemorrhage on CT abdomen pelvis 7/19 - no evidence of retroperitoneal hematoma.   - monitor Hb  - active T&S  - good IV access  - transfuse appropriately to Hb > 7
Suprapubic pain, leukocytosis.   UA with LE and bacteria  - Urine culture and Bcx positive for E. coli  - c/w zosyn renally adjusted
Recent admission Hb ~10-11.  Hb of 9 post-transfusion on admission, potentially after fluids given dilution  - elevated d-dimer (>1000), f/u on hemolytic workup labs.   no signs of hemorrhage on CT abdomen pelvis 7/19 - no evidence of retroperitoneal hematoma.   - monitor Hb  - active T&S  - good IV access  - transfuse appropriately to Hb > 7
Recent admission Hb ~10-11.  Hb of 9 post-transfusion on admission, potentially after fluids given dilution  - elevated d-dimer (>1000), f/u on hemolytic workup labs.   no signs of hemorrhage on CT abdomen pelvis 7/19 - no evidence of retroperitoneal hematoma.   - monitor Hb  - active T&S  - good IV access  - transfuse appropriately to Hb > 7

## 2022-07-28 NOTE — PROGRESS NOTE ADULT - PROBLEM SELECTOR PROBLEM 6
Sepsis with acute hypoxic respiratory failure
LIAM (acute kidney injury)
LIAM (acute kidney injury)
Acute metabolic encephalopathy
Sepsis with acute hypoxic respiratory failure
LIAM (acute kidney injury)
LIAM (acute kidney injury)
Acute metabolic encephalopathy
LIAM (acute kidney injury)
LIAM (acute kidney injury)

## 2022-07-28 NOTE — PROGRESS NOTE ADULT - PROBLEM SELECTOR PROBLEM 9
Constipation
HTN (hypertension)
Constipation

## 2022-07-28 NOTE — DIETITIAN INITIAL EVALUATION ADULT - PERTINENT MEDS FT
MEDICATIONS  (STANDING):  albuterol/ipratropium for Nebulization 3 milliLiter(s) Nebulizer every 6 hours  apixaban 2.5 milliGRAM(s) Oral every 12 hours  calcium carbonate    500 mG (Tums) Chewable 2 Tablet(s) Chew at bedtime  lidocaine   4% Patch 1 Patch Transdermal every 24 hours  metoprolol tartrate 25 milliGRAM(s) Oral two times a day  polyethylene glycol 3350 17 Gram(s) Oral daily    MEDICATIONS  (PRN):  acetaminophen     Tablet .. 650 milliGRAM(s) Oral every 6 hours PRN Mild Pain (1 - 3), Moderate Pain (4 - 6)  aluminum hydroxide/magnesium hydroxide/simethicone Suspension 30 milliLiter(s) Oral every 6 hours PRN Dyspepsia  guaiFENesin Oral Liquid (Sugar-Free) 200 milliGRAM(s) Oral every 6 hours PRN Cough

## 2022-07-28 NOTE — PROGRESS NOTE ADULT - PROBLEM SELECTOR PROBLEM 4
Acute UTI
LIAM (acute kidney injury)
Acute UTI

## 2022-07-28 NOTE — DISCHARGE NOTE NURSING/CASE MANAGEMENT/SOCIAL WORK - PATIENT PORTAL LINK FT
You can access the FollowMyHealth Patient Portal offered by [facility] by registering at the following website: [email or web address]. By joining [email or web address]’s FollowMyHealth portal, you will also be able to view your health information using other applications (apps) compatible with our system.

## 2022-07-28 NOTE — DISCHARGE NOTE NURSING/CASE MANAGEMENT/SOCIAL WORK - NSDCFUADDAPPT_GEN_ALL_CORE_FT
Please follow up with your primary care provider in 1 week regarding your fracture and possible orthopedics evaluation.

## 2022-07-28 NOTE — PROGRESS NOTE ADULT - PROBLEM SELECTOR PROBLEM 8
Urinary retention
Urinary retention
Acute metabolic encephalopathy
Constipation
Acute metabolic encephalopathy
Urinary retention
Acute metabolic encephalopathy
Acute metabolic encephalopathy

## 2022-07-28 NOTE — PROGRESS NOTE ADULT - REASON FOR ADMISSION
Fever, cough, confusion

## 2022-07-28 NOTE — DIETITIAN INITIAL EVALUATION ADULT - ADD RECOMMEND
1) Continue regular, lactovegetarian diet; defer consistency to SLP and team   2) Monitor PO intake, diet tolerance, weight trends, labs, GI function, and skin integrity  3) Honor food preferences

## 2022-07-28 NOTE — PROGRESS NOTE ADULT - PROBLEM SELECTOR PLAN 1
Fever, leukocytosis (WBC of 12.8), hypotension, tachycardia, Lactate of 1.7 on admission to 1.2 on 7/22.   - Blood culture and urine cultures positive for E. coli  - on IV ceftriaxone 2g adjusted to current regimen on 7/22, goal for 7 days of therapy, last day 7/28  - trend cbc, monitor vital signs for infection, monitor temperature curve  - elevated d-dimer down trending, (1700 -->1100), V/Q scan negative

## 2022-07-28 NOTE — DIETITIAN INITIAL EVALUATION ADULT - REASON FOR ADMISSION
89yo Female with PMH of IBS, HTN, Anemia. Pt with recent admission s/p fall; was d/c t rehab and now re-admitted for Sepsis likely 2/2 UTI.

## 2022-07-28 NOTE — PROGRESS NOTE ADULT - PROBLEM SELECTOR PROBLEM 3
Normocytic anemia
PNA (pneumonia)
Acute UTI
Normocytic anemia

## 2022-07-28 NOTE — DIETITIAN INITIAL EVALUATION ADULT - ORAL INTAKE PTA/DIET HISTORY
PTA per pt  -Intake: good PO intake; denies changes in appetite; follows vegetarian diet (consumes eggs, fish, nuts for protein)   -Chewing/Swallowing: denies difficulty   -Allergies/Intolerances: none   -Vitamins/Supplements: Calcium

## 2022-07-28 NOTE — PROGRESS NOTE ADULT - SUBJECTIVE AND OBJECTIVE BOX
Date of Service   07-28-22 @ 14:03    Patient is a 89y old  Female who presents with a chief complaint of 89yo Female with PMH of IBS, HTN, Anemia. Pt with recent admission s/p fall; was d/c t rehab and now re-admitted for Sepsis likely 2/2 UTI.      (28 Jul 2022 12:19)      INTERVAL HISTORY: pt feels ok     REVIEW OF SYSTEMS:   CONSTITUTIONAL: No weakness  EYES/ENT: No visual changes; No throat pain  Neck: No pain or stiffness  Respiratory: No cough, wheezing, No shortness of breath  CARDIOVASCULAR: no chest pain or palpitations  GASTROINTESTINAL: No abdominal pain, no nausea, vomiting or hematemesis  GENITOURINARY: No dysuria, frequency or hematuria  NEUROLOGICAL: No stroke like symptoms  SKIN: No rashes    	  MEDICATIONS:  metoprolol tartrate 25 milliGRAM(s) Oral two times a day        PHYSICAL EXAM:  T(C): 36.7 (07-28-22 @ 12:28), Max: 37.3 (07-27-22 @ 18:18)  HR: 76 (07-28-22 @ 12:28) (70 - 89)  BP: 121/67 (07-28-22 @ 12:28) (106/70 - 154/83)  RR: 18 (07-28-22 @ 12:28) (18 - 18)  SpO2: 94% (07-28-22 @ 12:28) (93% - 96%)  Wt(kg): --  I&O's Summary    27 Jul 2022 07:01  -  28 Jul 2022 07:00  --------------------------------------------------------  IN: 600 mL / OUT: 850 mL / NET: -250 mL    28 Jul 2022 07:01  -  28 Jul 2022 14:03  --------------------------------------------------------  IN: 0 mL / OUT: 500 mL / NET: -500 mL          Appearance: In no distress	  HEENT:    PERRL, EOMI	  Cardiovascular:  S1 S2, No JVD  Respiratory: Lungs clear to auscultation	  Gastrointestinal:  Soft, Non-tender, + BS	  Vasculature:  No edema of LE  Psychiatric: Appropriate affect   Neuro: no acute focal deficits                               10.4   9.47  )-----------( 380      ( 28 Jul 2022 07:18 )             31.8     07-28    134<L>  |  94<L>  |  26<H>  ----------------------------<  102<H>  4.5   |  26  |  1.50<H>    Ca    9.8      28 Jul 2022 07:18  Phos  4.4     07-28  Mg     2.0     07-28    TPro  7.0  /  Alb  3.4  /  TBili  0.2  /  DBili  x   /  AST  32  /  ALT  50<H>  /  AlkPhos  91  07-28        Labs personally reviewed      ASSESSMENT/PLAN: 	  Ms. Hadley is a 89 yo female with PMH of HTN, IBS, osteoporosis, anemia, recent admission fpr fall and R shoulder dislocation and Hill-Sachs lesion/ fracture and discharged to rehab presents for fever, lethargy, altered mental status and reported hypoxia for 2 days found to have e-coli urosepsis and now with new onset AF.    Problem/Plan -1  Problem: New Onset Atrial Fibrillation  - reported overnight with HR in the 150s  - Admission EKG with sinus tach and frequent PVCs  - AF confirmed   - Chadsvasc score 4   - pt on Eliquis 2.5 mg PO BID for AC   - Pt on Metoprolol PO for rate control   - TTE reveals preserved EF with severe AS   - HR controlled     Problem/Plan -2  Problem: HTN  - previously on olmesartan  - Cont to hold olmesartan to provide room for rate control medication  - c.w amlodipine and metoprolol  - BP stable     Problem/Plan -3  Problem: Anemia  - CT A/P negative for RP bleed  - CBC stable --> cont to monitor closely and transfuse Hgb< 7  - H&H stable     Problem/Plan -4  Problem: E-Coli Bacteremia  - Urine and Blood Cultures + e-coli  - c/w IV Abx   - monitor fever curve, WBC trend    Problem/Plan -5  Problem: Severe AS  - Structural Heart consult appreciated  - As per patient and son's request, no inpatient workup for TAVR at this time  - Will follow up as outpatient      Olga Askew FNP-BC   Rodger Wharton DO [facility]  Cardiovascular Medicine  [address] [place] [address], [address]  Office: [phone]

## 2022-07-28 NOTE — PROGRESS NOTE ADULT - PROBLEM SELECTOR PLAN 9
History of IBS  Patient with few days without BM, much less ambulation than her baseline  - miralax qd for now  - monitor for BMs
History of IBS  Patient with few days without BM, much less ambulation than her baseline  - miralax qd and senna   - monitor for BMs
History of IBS  Patient with few days without BM, much less ambulation than her baseline  - miralax qd for now  - monitor for BMs
History of IBS  Patient with few days without BM, much less ambulation than her baseline  - miralax qd for now  - monitor for BMs
Hypotensive on admission  Home med olmesartan 40 qd  - Monitor BPs  - hold BP meds i/s/o sepsis
History of IBS  Patient with few days without BM, much less ambulation than her baseline  - miralax qd and senna   - monitor for BMs

## 2022-07-28 NOTE — PROGRESS NOTE ADULT - ASSESSMENT
89F w/ hx of HTN, IBS, osteoporosis, anemia, recent admission for R shoulder dislocation and Hill-Sachs lesion/ fracture due to fall and discharged to rehab facility, presents from rehab facility for sepsis secondary to UTI which are in the setting of anemia and LIAM. Found to have E coli in urine and blood cultures on 2 g IV Ceftriaxone and clinical signs of infection are currently improving, course complicated by new onset Afib w/ RVR, currently on lopressor 25 mg BID with HR now predominantly in the 60-80's. Found to have severe aortic stenosis w/ preserved EF on TTE (7/21) Structural heart team consulted and subsequent workup declined for cardiac cath and CT s/p family discussion.

## 2022-07-28 NOTE — DIETITIAN INITIAL EVALUATION ADULT - OTHER INFO
GI/Intake:   -Per flowsheet, 0-75% intake of meals; pt's baseline intake is ~50-75% of portion size   -Pt denies supplements   -Last BM documented ; bowel regimen ordered (Miralax)   -Denies nausea/vomiting, diarrhea/constipation    Renal:  -LIAM with hyponatremia     Weight Hx:  -Current dosin pounds   -Per pt, UBW: 108 pounds

## 2022-07-28 NOTE — DIETITIAN INITIAL EVALUATION ADULT - PERTINENT LABORATORY DATA
07-28    134<L>  |  94<L>  |  26<H>  ----------------------------<  102<H>  4.5   |  26  |  1.50<H>    Ca    9.8      28 Jul 2022 07:18  Phos  4.4     07-28  Mg     2.0     07-28    TPro  7.0  /  Alb  3.4  /  TBili  0.2  /  DBili  x   /  AST  32  /  ALT  50<H>  /  AlkPhos  91  07-28

## 2022-07-28 NOTE — PROGRESS NOTE ADULT - PROBLEM SELECTOR PLAN 10
Hypotensive on admission  Home med olmesartan 40 qd  - Monitor BPs  - hold BP meds i/s/o sepsis
Hypotensive on admission  Home med olmesartan 40 qd  - Monitor BPs  - holding home meds until resolution of LIAM.
Hypotensive on admission  Home med olmesartan 40 qd  - Monitor BPs  - hold BP meds i/s/o sepsis
DVT PPx: SCDs due to anemia, consider transitioning to heparin/lovenox if anemia is stable and no bleeding   Diet: Vegetarian   Dispo: Sepsis management and tx, resolution of LIAM
Hypotensive on admission  Home med olmesartan 40 qd  - Monitor BPs  - holding home meds until resolution of LIAM.
Hypotensive on admission  Home med olmesartan 40 qd  - Monitor BPs  - hold BP meds i/s/o sepsis
Hypotensive on admission  Home med olmesartan 40 qd  - Monitor BPs  - holding home meds until resolution of LIAM.
Hypotensive on admission  Home med olmesartan 40 qd  - Monitor BPs  - holding home meds until resolution of ILAM.
Hypotensive on admission  Home med olmesartan 40 qd  - Monitor BPs  - holding home meds until resolution of LIAM.
Hypotensive on admission  Home med olmesartan 40 qd  - Monitor BPs  - holding home meds until resolution of LIAM.

## 2022-07-28 NOTE — DISCHARGE NOTE NURSING/CASE MANAGEMENT/SOCIAL WORK - NSDCPEFALRISK_GEN_ALL_CORE
For information on Fall & Injury Prevention, visit: [email or web address] OR  [email or web address] OR  https://www.cdc.gov/steadi/patient.html

## 2022-07-28 NOTE — PROGRESS NOTE ADULT - PROBLEM SELECTOR PROBLEM 5
LIAM (acute kidney injury)
Moderate to severe aortic stenosis
Moderate to severe aortic stenosis
Sepsis with acute hypoxic respiratory failure
LIAM (acute kidney injury)
Moderate to severe aortic stenosis
LIAM (acute kidney injury)
Moderate to severe aortic stenosis

## 2022-07-28 NOTE — PROGRESS NOTE ADULT - SUBJECTIVE AND OBJECTIVE BOX
***************************************************************  Faye Luz MD (PGY-1)  Internal Medicine  Pager: [phone]  ***************************************************************    PROGRESS NOTE:     Patient is a 89y old  Female who presents with a chief complaint of Fever, cough, confusion (27 Jul 2022 12:00)      SUBJECTIVE / OVERNIGHT EVENTS: Patient seen and examined at bedside. No acute overnight events. This morning, the patient is comfortable and doing well. No acute complaints. Denies fevers, chills, N/V/D, chest pain, SOB, abdominal pain.    MEDICATIONS  (STANDING):  albuterol/ipratropium for Nebulization 3 milliLiter(s) Nebulizer every 6 hours  apixaban 2.5 milliGRAM(s) Oral every 12 hours  calcium carbonate    500 mG (Tums) Chewable 2 Tablet(s) Chew at bedtime  lidocaine   4% Patch 1 Patch Transdermal every 24 hours  metoprolol tartrate 25 milliGRAM(s) Oral two times a day  polyethylene glycol 3350 17 Gram(s) Oral daily    MEDICATIONS  (PRN):  acetaminophen     Tablet .. 650 milliGRAM(s) Oral every 6 hours PRN Mild Pain (1 - 3), Moderate Pain (4 - 6)  aluminum hydroxide/magnesium hydroxide/simethicone Suspension 30 milliLiter(s) Oral every 6 hours PRN Dyspepsia  guaiFENesin Oral Liquid (Sugar-Free) 200 milliGRAM(s) Oral every 6 hours PRN Cough      CAPILLARY BLOOD GLUCOSE        I&O's Summary    27 Jul 2022 07:01  -  28 Jul 2022 07:00  --------------------------------------------------------  IN: 600 mL / OUT: 850 mL / NET: -250 mL        PHYSICAL EXAM:  Vital Signs Last 24 Hrs  T(C): 36.7 (28 Jul 2022 04:13), Max: 37.3 (27 Jul 2022 18:18)  T(F): 98 (28 Jul 2022 04:13), Max: 99.1 (27 Jul 2022 18:18)  HR: 81 (28 Jul 2022 04:13) (70 - 89)  BP: 115/77 (28 Jul 2022 04:13) (106/70 - 154/83)  BP(mean): --  RR: 18 (28 Jul 2022 04:13) (18 - 18)  SpO2: 96% (28 Jul 2022 04:13) (93% - 96%)    Parameters below as of 28 Jul 2022 04:13  Patient On (Oxygen Delivery Method): room air    Constitutional: NAD  HEENT: NC/AT, MMM  Respiratory: CTA b/l, good air entry b/l, no wheezing, no rhonchi, no rales  Cardiovascular: RRR, normal S1S2  Gastrointestinal: soft, NTND, no masses palpable  Extremities: No edema appreciated on b/l lower extremities  Neurological: AAOx3  Skin: Normal temperature, warm, dry, diffuse senile ecchymosis on b/l upper extremities.     LABS:                        10.4   9.47  )-----------( 380      ( 28 Jul 2022 07:18 )             31.8     07-27    134<L>  |  94<L>  |  24<H>  ----------------------------<  102<H>  4.7   |  28  |  1.56<H>    Ca    10.0      27 Jul 2022 07:26  Phos  4.8     07-27  Mg     1.9     07-27    TPro  7.1  /  Alb  3.3  /  TBili  0.2  /  DBili  x   /  AST  28  /  ALT  40  /  AlkPhos  94  07-27                RADIOLOGY & ADDITIONAL TESTS:  Results Reviewed:   Imaging Personally Reviewed:  Electrocardiogram Personally Reviewed:    COORDINATION OF CARE:  Care Discussed with Consultants/Other Providers [Y/N]:  Prior or Outpatient Records Reviewed [Y/N]:

## 2022-07-28 NOTE — PROGRESS NOTE ADULT - NS ATTEND AMEND GEN_ALL_CORE FT
Pt care and plan discussed and reviewed with NP. Plan as outlined above edited by me to reflect our discussion.

## 2022-07-28 NOTE — PROGRESS NOTE ADULT - PROVIDER SPECIALTY LIST ADULT
Cardiology
Vascular Surgery
Internal Medicine

## 2022-07-28 NOTE — PROGRESS NOTE ADULT - PROBLEM SELECTOR PROBLEM 2
New onset atrial fibrillation
Sepsis with acute hypoxic respiratory failure
New onset atrial fibrillation
Normocytic anemia
New onset atrial fibrillation

## 2022-07-28 NOTE — PROGRESS NOTE ADULT - ATTENDING COMMENTS
89F w/ hx of HTN, IBS, osteoporosis, anemia, recent admission for R shoulder dislocation and Hill-Sachs lesion/ fracture due to fall and discharged to rehab facility, presents from rehab facility for sepsis secondary to UTI which are in the setting of anemia and LIAM. Found to have E coli in urine and blood cultures on 2 g IV Ceftriaxone and clinical signs of infection are currently improving, course complicated by new onset Afib w/ RVR, now rate is controlled currently on lopressor 25 mg BID with HR now predominantly in the 60-80's.  Cont with Eliquis as per cardio.  She was found to have severe aortic stenosis w/ preserved EF on TTE (7/21) Structural heart team consulted due to Aortic stenosis and the family is not amenable for surgery currently and as per structural heart team , patient will need repeat of TTE in 3 months and decision will be made based on TTE repeat.  Goal is to DC today with home services and she is to follow up with PCP, Cardio and Ortho as well ( Dr Holloway as per ortho note on 7/11).  DC time 45mns         Marta nieto   hospitalist   [phone].

## 2022-07-28 NOTE — PROGRESS NOTE ADULT - PROBLEM SELECTOR PLAN 11
DVT PPx: Eliquis 2.5 mg BID  Diet: Vegetarian   Dispo: Sepsis management and tx, resolution of LIAM
DVT PPx: SCDs due to anemia, consider transitioning to heparin/lovenox if anemia is stable and no bleeding   Diet: Vegetarian   Dispo: Sepsis management and tx, resolution of LIAM
DVT PPx: Eliquis 2.5 mg BID  Diet: Vegetarian   Dispo: Sepsis management and tx, resolution of LIAM
DVT PPx: Eliquis 2.5 mg BID  Diet: Vegetarian   Dispo: Sepsis management and tx, resolution of LIAM
DVT PPx: SCDs due to anemia, consider transitioning to heparin/lovenox if anemia is stable and no bleeding   Diet: Vegetarian   Dispo: Sepsis management and tx, resolution of LIAM
DVT PPx: ?SCDs since anemia, consider transitioning to heparin/lovenox if anemia is stable and no bleeding   Diet: Vegetarian   Dispo: Sepsis management and tx, resolution of LIAM
DVT PPx: Eliquis 2.5 mg BID  Diet: Vegetarian   Dispo: Sepsis management and tx, resolution of LIAM

## 2022-07-28 NOTE — PROGRESS NOTE ADULT - PROBLEM SELECTOR PROBLEM 10
HTN (hypertension)
HTN (hypertension)
Need for prophylactic measure
HTN (hypertension)

## 2022-08-04 ENCOUNTER — APPOINTMENT (OUTPATIENT)
Dept: ORTHOPEDIC SURGERY | Facility: CLINIC | Age: 87
End: 2022-08-04
Payer: MEDICARE

## 2022-08-04 VITALS — BODY MASS INDEX: 23.09 KG/M2 | WEIGHT: 110 LBS | HEIGHT: 58 IN

## 2022-08-04 PROCEDURE — 99212 OFFICE O/P EST SF 10 MIN: CPT

## 2022-09-14 ENCOUNTER — APPOINTMENT (OUTPATIENT)
Dept: ORTHOPEDIC SURGERY | Facility: CLINIC | Age: 87
End: 2022-09-14
Payer: MEDICARE

## 2022-09-14 VITALS
DIASTOLIC BLOOD PRESSURE: 73 MMHG | HEART RATE: 70 BPM | HEIGHT: 58 IN | BODY MASS INDEX: 23.09 KG/M2 | SYSTOLIC BLOOD PRESSURE: 147 MMHG | WEIGHT: 110 LBS

## 2022-09-14 DIAGNOSIS — S42.201A UNSPECIFIED FRACTURE OF UPPER END OF RIGHT HUMERUS, INITIAL ENCOUNTER FOR CLOSED FRACTURE: ICD-10-CM

## 2022-09-14 PROCEDURE — 99212 OFFICE O/P EST SF 10 MIN: CPT

## 2022-09-17 NOTE — PATIENT PROFILE ADULT - SURGICAL SITE INCISION
ALF rule out ATN / relative hypotension/ sepsis / E cloaca bacteremia / HTN ( was on multiple meds )/ CVA/ GIB  sp HD yesterday   no need for HD today   on lasix 80mg iv q12h cont non oliguric   work up to date is neg for GN ( LUIS FELIPE DsDNA  neg / RF noted/ SPEP with inflammatory pattern )  no thrombocytopenia   No indication yet for a kidney biopsy from renal stand point  s/p trach   ph noted / increased sevelamer to 3 tabs po q8h/ feed nepro / repeat IP noted/ add phoslo one tab po q8h   overall prognosis poor  will follow  no

## 2022-09-19 ENCOUNTER — APPOINTMENT (OUTPATIENT)
Dept: ENDOCRINOLOGY | Facility: CLINIC | Age: 87
End: 2022-09-19

## 2022-09-19 VITALS
OXYGEN SATURATION: 95 % | SYSTOLIC BLOOD PRESSURE: 116 MMHG | BODY MASS INDEX: 22.95 KG/M2 | DIASTOLIC BLOOD PRESSURE: 76 MMHG | WEIGHT: 109.8 LBS | TEMPERATURE: 98.1 F | HEART RATE: 65 BPM

## 2022-09-19 PROCEDURE — 99214 OFFICE O/P EST MOD 30 MIN: CPT | Mod: 25

## 2022-09-19 PROCEDURE — 96401 CHEMO ANTI-NEOPL SQ/IM: CPT

## 2022-09-19 RX ORDER — DENOSUMAB 60 MG/ML
60 INJECTION SUBCUTANEOUS
Qty: 1 | Refills: 0 | Status: COMPLETED | OUTPATIENT
Start: 2022-09-19

## 2022-09-19 RX ADMIN — DENOSUMAB 0 MG/ML: 60 INJECTION SUBCUTANEOUS at 00:00

## 2022-09-19 NOTE — ASSESSMENT
[FreeTextEntry1] : Patient is a 89-year-old woman with history of osteoporosis here for endocrinology follow-up\par \par 1.  Osteoporosis\par Patient was on Fosamax in the past.  Was discontinued in the setting of IBS, constipation type.\par It is unlikely the cause of constipation and patient was started on Prolia in May 2019.  Secondary to the COVID pandemic, pace and missed her 6-month dosing in May 2020.\par She received a dose in November 2020 and another dose in July 2021.\par Here for her injection today.\par \par Check CMP and Vitmain D level\par \par Discussed the risk of ONJ and atypical femur fracture.\par Bone density November 10, 2020\par L1-L4: BMD 0.769, T score -2.5, Z score 0.3\par 2019 T score -2.5\par Femoral neck: BMD 0.676, T score -1.6, Z score 1.0\par 2019 T score -1.9\par Total hip: BMD 0.680, T score -2.2, Z score 0.2\par 2019 T score -2.2\par \par Repeat bone density this year

## 2022-09-19 NOTE — HISTORY OF PRESENT ILLNESS
[FreeTextEntry1] : 89-year-old woman here for for follow-up of osteoporosis\par To bisphosphonate in the past for about 9 years and been on a drug holiday for 4 to 5 years.\par Patient had no history of ONJ.  No interval fractures.  She had a fall in July 2022, had a shoulder fracture following with Ortho getting physical therapy\par History of wrist fracture 30 years ago.\par Had dental implant work that was completed more than 7 years ago.\par Patient is doing well\par Lives alone and is very active.  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

## 2022-10-18 ENCOUNTER — APPOINTMENT (OUTPATIENT)
Dept: ENDOCRINOLOGY | Facility: CLINIC | Age: 87
End: 2022-10-18

## 2022-10-18 VITALS — BODY MASS INDEX: 23.73 KG/M2 | HEIGHT: 57 IN | TEMPERATURE: 98.1 F | WEIGHT: 110 LBS

## 2022-10-18 PROCEDURE — ZZZZZ: CPT

## 2022-11-08 ENCOUNTER — OUTPATIENT (OUTPATIENT)
Dept: OUTPATIENT SERVICES | Facility: HOSPITAL | Age: 87
LOS: 1 days | End: 2022-11-08
Payer: MEDICARE

## 2022-11-08 ENCOUNTER — APPOINTMENT (OUTPATIENT)
Dept: CARDIOTHORACIC SURGERY | Facility: CLINIC | Age: 87
End: 2022-11-08

## 2022-11-08 VITALS
HEIGHT: 57 IN | SYSTOLIC BLOOD PRESSURE: 146 MMHG | RESPIRATION RATE: 16 BRPM | HEART RATE: 74 BPM | DIASTOLIC BLOOD PRESSURE: 84 MMHG | BODY MASS INDEX: 23.73 KG/M2 | TEMPERATURE: 98 F | OXYGEN SATURATION: 96 % | WEIGHT: 110 LBS

## 2022-11-08 DIAGNOSIS — I35.0 NONRHEUMATIC AORTIC (VALVE) STENOSIS: ICD-10-CM

## 2022-11-08 DIAGNOSIS — D64.9 ANEMIA, UNSPECIFIED: ICD-10-CM

## 2022-11-08 PROCEDURE — 93306 TTE W/DOPPLER COMPLETE: CPT | Mod: 26

## 2022-11-08 PROCEDURE — 99204 OFFICE O/P NEW MOD 45 MIN: CPT

## 2022-11-08 PROCEDURE — 93306 TTE W/DOPPLER COMPLETE: CPT

## 2022-11-08 RX ORDER — LOSARTAN POTASSIUM 25 MG/1
25 TABLET, FILM COATED ORAL DAILY
Qty: 90 | Refills: 0 | Status: COMPLETED | COMMUNITY
Start: 2021-06-15 | End: 2022-11-08

## 2022-11-08 RX ORDER — APIXABAN 5 MG/1
5 TABLET, FILM COATED ORAL
Qty: 30 | Refills: 0 | Status: ACTIVE | COMMUNITY
Start: 2022-11-08

## 2022-11-08 RX ORDER — AMLODIPINE BESYLATE 2.5 MG/1
2.5 TABLET ORAL
Refills: 0 | Status: ACTIVE | COMMUNITY
Start: 2020-08-03

## 2022-11-08 RX ORDER — TRAMADOL HYDROCHLORIDE 50 MG/1
50 TABLET, COATED ORAL
Qty: 40 | Refills: 0 | Status: COMPLETED | COMMUNITY
Start: 2021-06-29 | End: 2022-11-08

## 2022-11-08 NOTE — ADDENDUM
[FreeTextEntry1] : Mrs Rivas has moderate AS and is symptomatic.\par \par I explained the rationale for TAVR but she is reluctant to have a procedure.\par \par We have arranged for her to come back in 4-5 months for a repeat TTE.

## 2022-11-08 NOTE — HISTORY OF PRESENT ILLNESS
New referral placed  Oliver Lange M.D.     [FreeTextEntry1] : Ms. Love is a 89 year old female who presents today for evaluation of her aortic stenosis. Followed by Dr. Cm Cox. To review, PMH includes HTN, IBS, osteoporosis, anemia and afib (eliquis).  She was hospitalized oveer the summer sp mechanical fall with right humerus fx.  At that time she underwent TTE that showed calcified trileaflet AV, PVG 36 mmHg, MVG 20mmHg and LEATHA 0.7 cm2.  She was DC home to follow up with DR. Cox and presents today for follow up with TTE.  \par \par Presents today with her sones Verena and Sameer.  She reports feeling well considering.Lives alone with 24 hr support and reports no trouble walking.  Walks frequently, 1/2 mile to 1 mile almost daily.  Reports some SOB that is about the same over the past and hasn’t changed much.  Able to care for ADL's independently but her sons got her an AId to help.  Denies CP, swelling, palpitations, weight gain/loss, cough fever or chills. Of note pt and sone report she is on Metoprolol but not sure of dose. \par \par

## 2022-11-08 NOTE — ASSESSMENT
[FreeTextEntry1] : I reviewed the cardiac imaging, medical records and reports with patient and discussed the case. TTE today reviewed and evaluated by Dr. Agnes Wan and Dr. Davies with aortic stenosis remaining moderate on TTE.  Discussed possibility of EXPAND II Trial.  GIven pt is functioning well at this time, pt does not wish to proceed with workup at this time for EXPAND II trial.  \par \par Plan:\par \par - Repeat TTE in 4-5 months\par - Will call sooner if she develops symptoms.\par - Follow up with cardiologist Dr. Marcelo Cox\par - Call with any questions or concerns

## 2022-11-08 NOTE — END OF VISIT
[FreeTextEntry3] : I, Dr. Davies, personally performed the evaluation and management services for this new patient.  That includes conducting the initial examination, assessing all conditions, and establishing the plan of care.  Today, IFEOMA,  Genaro Malone NP was here to observe my evaluation and management services for this patient to be followed going forward.\par \par \par

## 2022-11-08 NOTE — CONSULT LETTER
[Dear  ___] : Dear  [unfilled], [Courtesy Letter:] : I had the pleasure of seeing your patient, [unfilled], in my office today. [Please see my note below.] : Please see my note below. [Sincerely,] : Sincerely, [FreeTextEntry2] : Cm Cox MD\par [phone] [place] [address]\[place] [address]\par [phone]   fax [phone]\par  [FreeTextEntry3] : Codey Davies MD\par Attending Surgeon\par Cardiovascular & Thoracic Surgery

## 2022-11-08 NOTE — REVIEW OF SYSTEMS
[As Noted in HPI] : as noted in HPI [Shortness Of Breath] : shortness of breath [SOB on Exertion] : shortness of breath during exertion [Negative] : Constitutional

## 2023-01-03 NOTE — PHYSICAL EXAM
[de-identified] : Ms. PAXTON LEY is sitting comfortably in the exam room \par Right shoulder\par -Skin is intact, no swelling, no ecchymosis\par -Incision is clean and dry, no erythema, no signs of infection\par -FE: , ER: , IR: , ABD:\par -Able to make a fist\par -Sensation is intact median, radial, ulnar, axillary nerves\par -Motor is intact median, radial, ulnar, axillary nerves\par -Hand is warm and well-perfused, Palpable radial and ulnar pulses\par  [de-identified] : No Xrays were taken in the office today, 7/21/22.

## 2023-01-03 NOTE — DISCUSSION/SUMMARY
[de-identified] : 89-year-old woman sustained shoulder dislocation and minor fracture, approximately 2 months out, doing great\par \par -Follow-up in 3 months with x-rays at that time\par -Physical therapy: Range of motion exercises, home exercises were demonstrated in the office\par -All the patient's questions and concerns were addressed during this visit\par

## 2023-01-03 NOTE — PHYSICAL EXAM
[de-identified] : Ms. PAXTON LEY is sitting comfortably in the exam room \par Right shoulder\par -Skin is intact, no swelling, no ecchymosis\par -Incision is clean and dry, no erythema, no signs of infection\par -FE: , ER: , IR: , ABD:\par -Able to make a fist\par -Sensation is intact median, radial, ulnar, axillary nerves\par -Motor is intact median, radial, ulnar, axillary nerves\par -Hand is warm and well-perfused, Palpable radial and ulnar pulses\par  [de-identified] : Xrays of the right shoulder were taken in the office today, 9/14/22.

## 2023-01-03 NOTE — DISCUSSION/SUMMARY
[de-identified] : 89-year-old woman sustained shoulder dislocation and minor fracture, approximately 1.5 weeks out.\par \par - Weightbearing\par -for pain\par -Physical therapy:\par -Home exercises.  These were demonstrated in the office today.\par -Follow-up in with x-rays at that time\par -All the patient's questions and concerns were addressed during this visit\par

## 2023-01-03 NOTE — HISTORY OF PRESENT ILLNESS
[de-identified] : PAXTON Card is a 89 y.o. woman who presents with a shoulder fracture after sustaining a shoulder dislocation on 7/11/22. Since her last visit she states the pain and stiffness in the right shoulder remains the same. She did a few sessions of home PT. However, she states she fell at home again 2 weeks ago, and she needs to be cleared to return to PT.

## 2023-01-03 NOTE — HISTORY OF PRESENT ILLNESS
[de-identified] : PAXTON Card is a 89 y.o. woman who presents with a shoulder fracture after sustaining a shoulder dislocation and went to [facility] on 7/11/22. Patient states she fell in a restaurant.

## 2023-03-08 NOTE — HISTORY OF PRESENT ILLNESS
[FreeTextEntry1] : Ms. Love is a 89 year old female followed and initially referred by Dr. Cm Cox for evaluation of her aortic stenosis. To review, PMH includes HTN, IBS, osteoporosis, anemia and afib (eliquis). She was hospitalized over the summer od 2022 sp mechanical fall with right humerus fx. At that time she underwent TTE that showed calcified trileaflet AV, PVG 36 mmHg, MVG 20mmHg and LEATHA 0.7 cm2. \par \par She was seen in our office for initial evaluation in November of last year.  At that time she endorsed minimum symptoms, was walking well from 1/2 mile to 1 mile almost daily. She did report some SOB that has been about the same and hasn’t changed much. Her TTE 11/8/2022 at time of visit showed calcified AV, PVG 42, MVG27, LEATHA 1cm2, DVI=0.30 consistent with moderate AS.\par \par Given she was feeling well with moderate AS, we discussed possibility of EXPAND II Trial. GIven pt is functioning well at this time, she did not wish to proceed with workup at that time for EXPAND II trial. \par \par She presents today for follow up with TTE and reports\par

## 2023-03-14 ENCOUNTER — APPOINTMENT (OUTPATIENT)
Dept: CARDIOTHORACIC SURGERY | Facility: CLINIC | Age: 88
End: 2023-03-14

## 2023-03-20 ENCOUNTER — APPOINTMENT (OUTPATIENT)
Dept: ENDOCRINOLOGY | Facility: CLINIC | Age: 88
End: 2023-03-20

## 2023-06-02 RX ORDER — DENOSUMAB 60 MG/ML
60 INJECTION SUBCUTANEOUS
Qty: 1 | Refills: 0 | Status: ACTIVE | COMMUNITY
Start: 2019-04-18

## 2023-06-13 ENCOUNTER — APPOINTMENT (OUTPATIENT)
Dept: ENDOCRINOLOGY | Facility: CLINIC | Age: 88
End: 2023-06-13
Payer: MEDICARE

## 2023-06-13 PROCEDURE — 96401 CHEMO ANTI-NEOPL SQ/IM: CPT

## 2023-06-13 RX ORDER — DENOSUMAB 60 MG/ML
60 INJECTION SUBCUTANEOUS
Qty: 1 | Refills: 0 | Status: COMPLETED | OUTPATIENT
Start: 2023-06-12

## 2023-06-16 ENCOUNTER — APPOINTMENT (OUTPATIENT)
Dept: GASTROENTEROLOGY | Facility: CLINIC | Age: 88
End: 2023-06-16
Payer: MEDICARE

## 2023-06-16 VITALS
OXYGEN SATURATION: 95 % | BODY MASS INDEX: 25.03 KG/M2 | HEART RATE: 95 BPM | TEMPERATURE: 98.2 F | SYSTOLIC BLOOD PRESSURE: 142 MMHG | DIASTOLIC BLOOD PRESSURE: 90 MMHG | HEIGHT: 57 IN | WEIGHT: 116 LBS

## 2023-06-16 DIAGNOSIS — K58.1 IRRITABLE BOWEL SYNDROME WITH CONSTIPATION: ICD-10-CM

## 2023-06-16 DIAGNOSIS — K59.09 OTHER CONSTIPATION: ICD-10-CM

## 2023-06-16 DIAGNOSIS — I48.91 UNSPECIFIED ATRIAL FIBRILLATION: ICD-10-CM

## 2023-06-16 PROCEDURE — 99214 OFFICE O/P EST MOD 30 MIN: CPT

## 2023-06-16 RX ORDER — METOPROLOL TARTRATE 25 MG/1
25 TABLET, FILM COATED ORAL
Refills: 0 | Status: ACTIVE | COMMUNITY

## 2023-06-16 NOTE — ASSESSMENT
[FreeTextEntry1] : \par 89-year-old woman with constipation predominant IBS, troubled by chronic lower abdominal pain\par \par She has managed her lower abdominal discomfort with dicyclomine\par Abdominal ultrasound was negative\par Colonoscopy was completed in 2019 (several small polyps removed)\par No lab work available for review over the 2 years\par Check CBC and CMP\par With no other alarm symptoms, and no change in her lower abdominal discomfort, can continue with present regimen; consider imaging with CT if changes

## 2023-06-16 NOTE — PHYSICAL EXAM
[Normal] : heart rate was normal and rhythm regular, normal S1 and S2, no murmurs [None] : no edema [de-identified] : 2/6 AIDEE [de-identified] : mildly tender lower abd; no masses, no rebound

## 2023-06-16 NOTE — HISTORY OF PRESENT ILLNESS
[FreeTextEntry1] : \par Dislocated right shoulder last summer\par Found to have AF - now on Eliquis\par Also found to have AS - saw cardiothoracic surgeon\par \par Has full time care now\par Has a walker but doesn't use it\par Not driving anymore\par \par Has same trouble with her stomach - Donnatal helps\par Feel discomfort in her lower abdomen - kind of daily - hard to know if its related to meals; no change in years\par However, her BMs are not as constipated - no more laxatives\par \par Good appetite\par Weight stable or up a few\par \par \par

## 2023-06-20 ENCOUNTER — INPATIENT (INPATIENT)
Facility: HOSPITAL | Age: 88
LOS: 2 days | Discharge: HOME HEALTH | End: 2023-06-23
Attending: STUDENT IN AN ORGANIZED HEALTH CARE EDUCATION/TRAINING PROGRAM | Admitting: STUDENT IN AN ORGANIZED HEALTH CARE EDUCATION/TRAINING PROGRAM
Payer: MEDICARE

## 2023-06-20 VITALS
DIASTOLIC BLOOD PRESSURE: 75 MMHG | RESPIRATION RATE: 18 BRPM | TEMPERATURE: 98 F | OXYGEN SATURATION: 98 % | HEART RATE: 85 BPM | SYSTOLIC BLOOD PRESSURE: 157 MMHG

## 2023-06-20 DIAGNOSIS — Z29.9 ENCOUNTER FOR PROPHYLACTIC MEASURES, UNSPECIFIED: ICD-10-CM

## 2023-06-20 DIAGNOSIS — J18.9 PNEUMONIA, UNSPECIFIED ORGANISM: ICD-10-CM

## 2023-06-20 DIAGNOSIS — G92.8 OTHER TOXIC ENCEPHALOPATHY: ICD-10-CM

## 2023-06-20 DIAGNOSIS — N39.0 URINARY TRACT INFECTION, SITE NOT SPECIFIED: ICD-10-CM

## 2023-06-20 DIAGNOSIS — N17.9 ACUTE KIDNEY FAILURE, UNSPECIFIED: ICD-10-CM

## 2023-06-20 DIAGNOSIS — I48.91 UNSPECIFIED ATRIAL FIBRILLATION: ICD-10-CM

## 2023-06-20 DIAGNOSIS — I10 ESSENTIAL (PRIMARY) HYPERTENSION: ICD-10-CM

## 2023-06-20 LAB
ALBUMIN SERPL ELPH-MCNC: 4 G/DL — SIGNIFICANT CHANGE UP (ref 3.3–5)
ALP SERPL-CCNC: 56 U/L — SIGNIFICANT CHANGE UP (ref 40–120)
ALT FLD-CCNC: 17 U/L — SIGNIFICANT CHANGE UP (ref 4–33)
ANION GAP SERPL CALC-SCNC: 13 MMOL/L — SIGNIFICANT CHANGE UP (ref 7–14)
APPEARANCE UR: CLEAR — SIGNIFICANT CHANGE UP
APTT BLD: 28.1 SEC — SIGNIFICANT CHANGE UP (ref 27–36.3)
AST SERPL-CCNC: 22 U/L — SIGNIFICANT CHANGE UP (ref 4–32)
BACTERIA # UR AUTO: ABNORMAL
BASOPHILS # BLD AUTO: 0.04 K/UL — SIGNIFICANT CHANGE UP (ref 0–0.2)
BASOPHILS NFR BLD AUTO: 0.4 % — SIGNIFICANT CHANGE UP (ref 0–2)
BILIRUB SERPL-MCNC: <0.2 MG/DL — SIGNIFICANT CHANGE UP (ref 0.2–1.2)
BILIRUB UR-MCNC: NEGATIVE — SIGNIFICANT CHANGE UP
BUN SERPL-MCNC: 32 MG/DL — HIGH (ref 7–23)
CALCIUM SERPL-MCNC: 9.6 MG/DL — SIGNIFICANT CHANGE UP (ref 8.4–10.5)
CHLORIDE SERPL-SCNC: 102 MMOL/L — SIGNIFICANT CHANGE UP (ref 98–107)
CO2 SERPL-SCNC: 20 MMOL/L — LOW (ref 22–31)
COLOR SPEC: SIGNIFICANT CHANGE UP
CREAT SERPL-MCNC: 1.55 MG/DL — HIGH (ref 0.5–1.3)
DIFF PNL FLD: NEGATIVE — SIGNIFICANT CHANGE UP
EGFR: 31 ML/MIN/1.73M2 — LOW
EOSINOPHIL # BLD AUTO: 0.26 K/UL — SIGNIFICANT CHANGE UP (ref 0–0.5)
EOSINOPHIL NFR BLD AUTO: 2.7 % — SIGNIFICANT CHANGE UP (ref 0–6)
EPI CELLS # UR: 0 /HPF — SIGNIFICANT CHANGE UP (ref 0–5)
GLUCOSE SERPL-MCNC: 129 MG/DL — HIGH (ref 70–99)
GLUCOSE UR QL: NEGATIVE — SIGNIFICANT CHANGE UP
HCT VFR BLD CALC: 35 % — SIGNIFICANT CHANGE UP (ref 34.5–45)
HGB BLD-MCNC: 11.3 G/DL — LOW (ref 11.5–15.5)
HYALINE CASTS # UR AUTO: 1 /LPF — SIGNIFICANT CHANGE UP (ref 0–7)
IANC: 4.78 K/UL — SIGNIFICANT CHANGE UP (ref 1.8–7.4)
IMM GRANULOCYTES NFR BLD AUTO: 0.4 % — SIGNIFICANT CHANGE UP (ref 0–0.9)
INR BLD: 1 RATIO — SIGNIFICANT CHANGE UP (ref 0.88–1.16)
KETONES UR-MCNC: NEGATIVE — SIGNIFICANT CHANGE UP
LEUKOCYTE ESTERASE UR-ACNC: ABNORMAL
LYMPHOCYTES # BLD AUTO: 3.77 K/UL — HIGH (ref 1–3.3)
LYMPHOCYTES # BLD AUTO: 39.6 % — SIGNIFICANT CHANGE UP (ref 13–44)
MCHC RBC-ENTMCNC: 30.8 PG — SIGNIFICANT CHANGE UP (ref 27–34)
MCHC RBC-ENTMCNC: 32.3 GM/DL — SIGNIFICANT CHANGE UP (ref 32–36)
MCV RBC AUTO: 95.4 FL — SIGNIFICANT CHANGE UP (ref 80–100)
MONOCYTES # BLD AUTO: 0.64 K/UL — SIGNIFICANT CHANGE UP (ref 0–0.9)
MONOCYTES NFR BLD AUTO: 6.7 % — SIGNIFICANT CHANGE UP (ref 2–14)
NEUTROPHILS # BLD AUTO: 4.78 K/UL — SIGNIFICANT CHANGE UP (ref 1.8–7.4)
NEUTROPHILS NFR BLD AUTO: 50.2 % — SIGNIFICANT CHANGE UP (ref 43–77)
NITRITE UR-MCNC: POSITIVE
NRBC # BLD: 0 /100 WBCS — SIGNIFICANT CHANGE UP (ref 0–0)
NRBC # FLD: 0 K/UL — SIGNIFICANT CHANGE UP (ref 0–0)
PH UR: 7 — SIGNIFICANT CHANGE UP (ref 5–8)
PLATELET # BLD AUTO: 195 K/UL — SIGNIFICANT CHANGE UP (ref 150–400)
POTASSIUM SERPL-MCNC: 4.5 MMOL/L — SIGNIFICANT CHANGE UP (ref 3.5–5.3)
POTASSIUM SERPL-SCNC: 4.5 MMOL/L — SIGNIFICANT CHANGE UP (ref 3.5–5.3)
PROT SERPL-MCNC: 6.8 G/DL — SIGNIFICANT CHANGE UP (ref 6–8.3)
PROT UR-MCNC: ABNORMAL
PROTHROM AB SERPL-ACNC: 11.6 SEC — SIGNIFICANT CHANGE UP (ref 10.5–13.4)
RBC # BLD: 3.67 M/UL — LOW (ref 3.8–5.2)
RBC # FLD: 14.1 % — SIGNIFICANT CHANGE UP (ref 10.3–14.5)
RBC CASTS # UR COMP ASSIST: 4 /HPF — SIGNIFICANT CHANGE UP (ref 0–4)
SODIUM SERPL-SCNC: 135 MMOL/L — SIGNIFICANT CHANGE UP (ref 135–145)
SP GR SPEC: 1.02 — SIGNIFICANT CHANGE UP (ref 1.01–1.05)
TROPONIN T, HIGH SENSITIVITY RESULT: 20 NG/L — SIGNIFICANT CHANGE UP
UROBILINOGEN FLD QL: SIGNIFICANT CHANGE UP
WBC # BLD: 9.53 K/UL — SIGNIFICANT CHANGE UP (ref 3.8–10.5)
WBC # FLD AUTO: 9.53 K/UL — SIGNIFICANT CHANGE UP (ref 3.8–10.5)
WBC UR QL: 121 /HPF — HIGH (ref 0–5)

## 2023-06-20 PROCEDURE — 71045 X-RAY EXAM CHEST 1 VIEW: CPT | Mod: 26

## 2023-06-20 PROCEDURE — 99223 1ST HOSP IP/OBS HIGH 75: CPT | Mod: GC

## 2023-06-20 PROCEDURE — 73120 X-RAY EXAM OF HAND: CPT | Mod: 26,RT

## 2023-06-20 PROCEDURE — 70498 CT ANGIOGRAPHY NECK: CPT | Mod: 26,MA

## 2023-06-20 PROCEDURE — 99285 EMERGENCY DEPT VISIT HI MDM: CPT

## 2023-06-20 PROCEDURE — 0042T: CPT | Mod: MA

## 2023-06-20 PROCEDURE — 73100 X-RAY EXAM OF WRIST: CPT | Mod: 26,RT

## 2023-06-20 PROCEDURE — 70496 CT ANGIOGRAPHY HEAD: CPT | Mod: 26,MA

## 2023-06-20 PROCEDURE — 73090 X-RAY EXAM OF FOREARM: CPT | Mod: 26,RT

## 2023-06-20 RX ORDER — APIXABAN 2.5 MG/1
2.5 TABLET, FILM COATED ORAL EVERY 12 HOURS
Refills: 0 | Status: DISCONTINUED | OUTPATIENT
Start: 2023-06-20 | End: 2023-06-23

## 2023-06-20 RX ORDER — CEFTRIAXONE 500 MG/1
1000 INJECTION, POWDER, FOR SOLUTION INTRAMUSCULAR; INTRAVENOUS ONCE
Refills: 0 | Status: DISCONTINUED | OUTPATIENT
Start: 2023-06-20 | End: 2023-06-20

## 2023-06-20 RX ORDER — FLUTICASONE FUROATE, UMECLIDINIUM BROMIDE AND VILANTEROL TRIFENATATE 200; 62.5; 25 UG/1; UG/1; UG/1
1 POWDER RESPIRATORY (INHALATION)
Qty: 0 | Refills: 0 | DISCHARGE

## 2023-06-20 RX ORDER — CEFTRIAXONE 500 MG/1
1000 INJECTION, POWDER, FOR SOLUTION INTRAMUSCULAR; INTRAVENOUS ONCE
Refills: 0 | Status: COMPLETED | OUTPATIENT
Start: 2023-06-20 | End: 2023-06-20

## 2023-06-20 RX ORDER — DENOSUMAB 60 MG/ML
1 INJECTION SUBCUTANEOUS
Qty: 0 | Refills: 0 | DISCHARGE

## 2023-06-20 RX ORDER — METOPROLOL TARTRATE 50 MG
1 TABLET ORAL
Refills: 0 | DISCHARGE

## 2023-06-20 RX ORDER — AMLODIPINE BESYLATE 2.5 MG/1
1 TABLET ORAL
Refills: 0 | DISCHARGE

## 2023-06-20 RX ORDER — APIXABAN 2.5 MG/1
1 TABLET, FILM COATED ORAL
Refills: 0 | DISCHARGE

## 2023-06-20 RX ORDER — OLMESARTAN MEDOXOMIL 5 MG/1
1 TABLET, FILM COATED ORAL
Qty: 0 | Refills: 0 | DISCHARGE

## 2023-06-20 RX ORDER — ATORVASTATIN CALCIUM 80 MG/1
80 TABLET, FILM COATED ORAL AT BEDTIME
Refills: 0 | Status: DISCONTINUED | OUTPATIENT
Start: 2023-06-20 | End: 2023-06-23

## 2023-06-20 RX ORDER — AZITHROMYCIN 500 MG/1
500 TABLET, FILM COATED ORAL ONCE
Refills: 0 | Status: COMPLETED | OUTPATIENT
Start: 2023-06-20 | End: 2023-06-20

## 2023-06-20 RX ORDER — CEFTRIAXONE 500 MG/1
1000 INJECTION, POWDER, FOR SOLUTION INTRAMUSCULAR; INTRAVENOUS EVERY 24 HOURS
Refills: 0 | Status: DISCONTINUED | OUTPATIENT
Start: 2023-06-20 | End: 2023-06-20

## 2023-06-20 RX ORDER — CEFTRIAXONE 500 MG/1
1000 INJECTION, POWDER, FOR SOLUTION INTRAMUSCULAR; INTRAVENOUS EVERY 24 HOURS
Refills: 0 | Status: DISCONTINUED | OUTPATIENT
Start: 2023-06-20 | End: 2023-06-23

## 2023-06-20 RX ADMIN — CEFTRIAXONE 100 MILLIGRAM(S): 500 INJECTION, POWDER, FOR SOLUTION INTRAMUSCULAR; INTRAVENOUS at 21:19

## 2023-06-20 RX ADMIN — AZITHROMYCIN 255 MILLIGRAM(S): 500 TABLET, FILM COATED ORAL at 22:39

## 2023-06-20 RX ADMIN — ATORVASTATIN CALCIUM 80 MILLIGRAM(S): 80 TABLET, FILM COATED ORAL at 23:09

## 2023-06-20 NOTE — H&P ADULT - NSHPPHYSICALEXAM_GEN_ALL_CORE
LOS:     VITALS:   T(C): 36.7 (06-20-23 @ 17:41), Max: 36.7 (06-20-23 @ 17:41)  HR: 85 (06-20-23 @ 17:41) (85 - 85)  BP: 157/75 (06-20-23 @ 17:41) (157/75 - 157/75)  RR: 18 (06-20-23 @ 17:41) (18 - 18)  SpO2: 98% (06-20-23 @ 17:41) (98% - 98%)    GENERAL: NAD, lying in bed comfortably  HEAD:  Atraumatic, Normocephalic  EYES: EOMI, PERRLA, conjunctiva and sclera clear  ENT: Moist mucous membranes  NECK: Supple, No JVD  CHEST/LUNG: Clear to auscultation bilaterally; No rales, rhonchi, wheezing, or rubs. Unlabored respirations  HEART: Regular rate and rhythm; No murmurs, rubs, or gallops  ABDOMEN: BSx4; Soft, nontender, nondistended  EXTREMITIES:  2+ Peripheral Pulses, brisk capillary refill. No clubbing, cyanosis, or edema  NERVOUS SYSTEM:  A&Ox3, no focal deficits   SKIN: No rashes or lesions LOS:     VITALS:   T(C): 36.7 (06-20-23 @ 17:41), Max: 36.7 (06-20-23 @ 17:41)  HR: 85 (06-20-23 @ 17:41) (85 - 85)  BP: 157/75 (06-20-23 @ 17:41) (157/75 - 157/75)  RR: 18 (06-20-23 @ 17:41) (18 - 18)  SpO2: 98% (06-20-23 @ 17:41) (98% - 98%)    GENERAL: NAD, lying in bed comfortably  HEAD:  Atraumatic, Normocephalic  EYES: EOMI, PERRLA, conjunctiva and sclera clear  ENT: Moist mucous membranes  NECK: Supple, No JVD  CHEST/LUNG: Clear to auscultation bilaterally; No rales, rhonchi, wheezing, or rubs. Unlabored respirations  HEART: Regular rate and rhythm; No murmurs, rubs, or gallops  ABDOMEN: mild tenderness to palpation of suprapubic region. no CVA tenderness  EXTREMITIES:  2+ Peripheral Pulses, brisk capillary refill. No clubbing, cyanosis, or edema  NERVOUS SYSTEM:  loss of R NLF however improves with smiling. CN2-12 intact. No facial droop. Slightly dysarthric. 5/5 strength in all extremities. Sensation grossly intact  SKIN: No rashes or lesions VITALS:   T(C): 36.7 (06-20-23 @ 17:41), Max: 36.7 (06-20-23 @ 17:41)  HR: 85 (06-20-23 @ 17:41) (85 - 85)  BP: 157/75 (06-20-23 @ 17:41) (157/75 - 157/75)  RR: 18 (06-20-23 @ 17:41) (18 - 18)  SpO2: 98% (06-20-23 @ 17:41) (98% - 98%)    GENERAL: NAD, lying in bed comfortably  HEAD:  Atraumatic, Normocephalic  EYES: EOMI, PERRLA, conjunctiva and sclera clear  MOUTH: Moist mucous membranes, no oropharyngeal exudates   NECK: Supple, No JVD, No LAD, trachea midline  CHEST/LUNG: Clear to auscultation bilaterally; No rales, rhonchi, wheezing, or rubs. Unlabored respirations  HEART: Regular rate and rhythm. Normal S1 and S2. No murmurs, rubs, or gallops. No LE edema b/l.   ABDOMEN: mild tenderness to palpation of suprapubic region. no CVA tenderness  EXTREMITIES:  2+ Peripheral Pulses, brisk capillary refill. No clubbing, cyanosis, or edema  NERVOUS SYSTEM: Loss of R NLF however improves with smiling. CN2-12 intact. No facial droop. Slightly dysarthric. 5/5 strength in all extremities. Sensation grossly intact in all extremities.   SKIN: No rashes or lesions

## 2023-06-20 NOTE — H&P ADULT - PROBLEM SELECTOR PLAN 3
Baseline Cr 1.26 in July 2022, on admission 1.55. Concern for pre-renal LIAM vs. CKD    - f/u urine studies  - monitor SCr  - renally dose meds; avoid nephrotoxic agents UA with 121 WBC and moderate bacteria    - management as above UA with positive nitrite, large LE, 121 WBC, and moderate bacteria. Pt also with dysuria.    - S/p IV ceftriaxone and azithromycin in ED. C/w IV ceftriaxone and azithromycin for now (6/20-  ) to cover for both UTI and possible PNA.

## 2023-06-20 NOTE — ED ADULT NURSE NOTE - OBJECTIVE STATEMENT
Leonel RN: Pt received to CT as code stroke c/o difficulty walking starting 9 PM last night. As per family, Pt having intermittent episodes of slurred speech since 2 AM today. No drift to upper extremities. Pt awake and alert. Pt also c/o difficulty swallowing. 20G IV placed in R forearm. Labs drawn and sent. Neuro at bedside. Roxana Garcia on [facility].

## 2023-06-20 NOTE — H&P ADULT - ASSESSMENT
89F with PMH AFib on elqiuis, HTN, anemia, presenting with difficulty with ambulation and slurred speech, code stroke workup unremarkable, admitted for toxic metabolic encephalopathy.  89F with PMH AFib on elqiuis, HTN, anemia, presenting with difficulty with ambulation and slurred speech, initial code stroke workup unremarkable, admitted for toxic metabolic encephalopathy and further stroke workup. 89 yo F with PMH of AFib on Eliquis, HTN, IBS, and anemia, presenting with difficulty with walking and slurred speech x 1 day, initial code stroke workup unremarkable, admitted for toxic metabolic encephalopathy and further stroke workup.

## 2023-06-20 NOTE — H&P ADULT - NSHPSOCIALHISTORY_GEN_ALL_CORE
Lives at home with [facility] 24/7. No smoking drugs or alcohol. No recent sick contacts. No travel hx. Denies any history of alcohol, tobacco, or illicit drug use.  Lives at home with A 24/7. No recent sick contacts. No travel hx.

## 2023-06-20 NOTE — H&P ADULT - ATTENDING COMMENTS
89 yo F with PMH of AFib on Eliquis, HTN, IBS, and anemia, presenting with difficulty with walking and slurred speech x 1 day, initial code stroke workup unremarkable, admitted for toxic metabolic encephalopathy and further stroke workup. Neurology following. MRI brain w/o contrast and TTE with bubble study pending. Currently on IV abx with ceftriaxone and azithromycin to cover for both UTI and PNA. Will discontinue azithromycin if procalcitonin unremarkable. F/u blood cxs and urine cx.

## 2023-06-20 NOTE — H&P ADULT - PROBLEM SELECTOR PLAN 6
Diet: NPO pending speech and swallow eval  DVT ppx: eliquis  Dispo: pending PT eval Diet: NPO pending speech and swallow eval  DVT ppx: eliquis  Dispo: pending PT/OT/PM&R eval On amlodipine 5 mg daily at home    - permissive hypertension over next 48h followed by gradual normotension On amlodipine 5 mg daily and metoprolol succinate 25 mg daily at home    - Hold pt's home amlodipine and metoprolol succinate for permissive hypertension over next 48hrs followed by gradual normotension

## 2023-06-20 NOTE — ED PROVIDER NOTE - CLINICAL SUMMARY MEDICAL DECISION MAKING FREE TEXT BOX
Patient presents emergency department with slurred speech and generalized weakness.  Given patient's presentation and history code stroke was called.  Neurology has arrived and will obtain CT CT perfusion studies to evaluate for stroke.  We will also obtain basic labs.  Pending labs and imaging for further disposition.\

## 2023-06-20 NOTE — H&P ADULT - PROBLEM SELECTOR PLAN 2
UA with 121 WBC and moderate bacteria    - management as above Arrived initially as code stroke for slurred speech and difficulty with ambulation. Code stroke initial workup with CTH, CTA, CT perfusion unremarkable. No lateralizing neurologic findings, less likely for stroke.   Symptoms may be related to infectious etiology as pt endorsing dysuria as well as +UA. Also with hazy opacity in R lower lung  Less likely aspiration, no hx of dysphagia, no witnessed choking events     - c/w ceftriaxone and azithromycin (6/20-  - f/u procal, if normal will dc azithro  - f/u Bcx, UCx  - monitor on tele Symptoms may also be related to infectious etiology as pt endorsing dysuria as well as +UA. Also with hazy opacity in R lower lung  Less likely aspiration, no hx of dysphagia, no witnessed choking events     - c/w ceftriaxone and azithromycin (6/20-  - f/u procal, if normal will dc azithro  - f/u Bcx, UCx  - monitor on tele Symptoms may also be related to infectious etiology, as pt endorsing dysuria as well as +UA. Also with hazy opacity in R lower lung on CXR.   Less likely aspiration, as pt with no hx of dysphagia, no witnessed choking events.     - S/p IV ceftriaxone and azithromycin in ED. C/w IV ceftriaxone and azithromycin for now (6/20-  ).  - F/u procalcitonin, if normal will d/c azithromycin  - F/u Bcx, UCx  - Monitor mental status  - Aspiration precautions and fall risk protocol

## 2023-06-20 NOTE — ED ADULT NURSE NOTE - HIV OFFER
Preventive Health Recommendations  Male Ages 40 to 49    Yearly exam:             See your health care provider every year in order to  o   Review health changes.   o   Discuss preventive care.    o   Review your medicines if your doctor has prescribed any.    You should be tested each year for STDs (sexually transmitted diseases) if you re at risk.     Have a cholesterol test every 5 years.     Have a colonoscopy (test for colon cancer) if someone in your family has had colon cancer or polyps before age 50.     After age 45, have a diabetes test (fasting glucose). If you are at risk for diabetes, you should have this test every 3 years.      Talk with your health care provider about whether or not a prostate cancer screening test (PSA) is right for you.    Shots: Get a flu shot each year. Get a tetanus shot every 10 years.     Nutrition:    Eat at least 5 servings of fruits and vegetables daily.     Eat whole-grain bread, whole-wheat pasta and brown rice instead of white grains and rice.     Talk to your provider about Calcium and Vitamin D.     Lifestyle    Exercise for at least 150 minutes a week (30 minutes a day, 5 days a week). This will help you control your weight and prevent disease.     Limit alcohol to one drink per day.     No smoking.     Wear sunscreen to prevent skin cancer.     See your dentist every six months for an exam and cleaning.      
Previously Declined (within the last year)

## 2023-06-20 NOTE — H&P ADULT - PROBLEM SELECTOR PLAN 5
On amlodipine 5 mg daily at home    - permissive hypertension over next 48h followed by gradual normotension CHADSVASC score 6 (if counting stroke hx from this admission)    - c/w home eliquis [id number] score 6 (if counting stroke hx from this admission).    - C/w home Eliquis  - Pt's home metoprolol succinate on hold for permissive hypertension

## 2023-06-20 NOTE — ED PROVIDER NOTE - ATTENDING CONTRIBUTION TO CARE
89-year-old female with history of A-fib on Eliquis last dose was last night presents with possible code stroke.  Patient went to bed normal at 9 PM last night.  Per sons at bedside patient woke up in the middle the night around 2 AM and seemed to be confused had difficulty walking.  Patient then went back to sleep and on waking up noted that she has been having intermittent slurred speech and difficulty walking.  Patient states she just does not feel right.  Also complains of headache earlier today.  Patient son states she usually walks normally and today was unable to get herself into the car.  Patient denies weakness in 1 side extremity just feels generally weak and not like herself.  Patient's son states that her speech is not as fluent as usual and sounds like her tongue is in her cheek.  On exam patient is well-appearing ANO x3 follows commands pupils equal and reactive to light extraocular movements intact no facial droop no.  Negative drift motor and or sensory deficit noted on my exam and speech seems clear though not at her baseline per her symptoms.  We will check CT CTA and dispo per neuro we will also rule out infectious etiology of her generalized weakness

## 2023-06-20 NOTE — CONSULT NOTE ADULT - SUBJECTIVE AND OBJECTIVE BOX
Neurology - Consult Note    -  Spectra: [phone] ([facility]), [phone] ([facility])  -    HPI: Patient PAXTON MERINO is a 89y (07-Sep-1930) wo/man with a PMHx HTN, IBS, osteoporosis, anemia, falls, presents to [facility] ED as a code stroke for difficulty walking and slurred speech, LKN 9PM 6/19 before she went to bed; patient woke up at 2AM and was noted by night aide to have difficulty with ambulation. In ED, NIHSS 2 for one question incorrect, and mild dysarthria. Patient has ?R NLF that corrects with smiling.       Review of Systems:  INCOMPLETE   CONSTITUTIONAL: No fevers or chills  EYES AND ENT: No visual changes or no throat pain   NECK: No pain or stiffness  RESPIRATORY: No hemoptysis or shortness of breath  CARDIOVASCULAR: No chest pain or palpitations  GASTROINTESTINAL: No melena or hematochezia  GENITOURINARY: No dysuria or hematuria  NEUROLOGICAL: +As stated in HPI above  SKIN: No itching, burning, rashes, or lesions   All other review of systems is negative unless indicated above.    Allergies:  sulfa drugs (Unknown)      PMHx/PSHx/Family Hx: As above, otherwise see below   History of IBS  Osteoporosis  Essential hypertension        Social Hx:  No current use of tobacco, alcohol, or illicit drugs  Lives at home, has 24 hr aide    Medications:  MEDICATIONS  (STANDING):    MEDICATIONS  (PRN):      Vitals:  T(C): 36.7 (06-20-23 @ 17:41), Max: 36.7 (06-20-23 @ 17:41)  HR: 85 (06-20-23 @ 17:41) (85 - 85)  BP: 157/75 (06-20-23 @ 17:41) (157/75 - 157/75)  RR: 18 (06-20-23 @ 17:41) (18 - 18)  SpO2: 98% (06-20-23 @ 17:41) (98% - 98%)    Physical Examination:    General - NAD  Cardiovascular - Peripheral pulses palpable, no edema  Eyes -   Fundoscopy not performed due to safety precautions in the setting of the COVID-19 pandemic    Neurologic Exam:  Mental status - Awake, Alert, Oriented to person, but not to month. Speech mildly dysarthric. Repetition and naming intact. Follows simple  commands.     Cranial nerves - PERRL, VFF, EOMI, face sensation (V1-V3) intact b/l, facial strength intact without asymmetry b/l, hearing intact b/l, palate with symmetric elevation, trapezius  5/5 strength b/l, tongue midline on protrusion with full lateral movement    Motor - decreased bulk and normal tone throughout. No pronator drift.    Strength testing, for patient's habitus:            Deltoid      Biceps      Triceps     Wrist Extension    Wrist Flexion     Interossei         R            5                 5               5                     5                              5                        5                 5  L             5                 5               5                     5                              5                        5                 5              Hip Flexion    Hip Extension    Knee Flexion    Knee Extension       R              5                           5                       5                           5                               L              5                           5                        5                           5                                Sensation - Light touch  intact throughout    DTR's - deferred               Coordination - Finger to Nose intact b/l. Mild tremors appreciated    Gait and station -     Labs:          CAPILLARY BLOOD GLUCOSE  139 (20 Jun 2023 18:09)      POCT Blood Glucose.: 139 mg/dL (20 Jun 2023 17:43)          CSF:      Radiology:     Neurology - Consult Note    -  Spectra: [phone] ([facility]), [phone] ([facility])  -    HPI: Patient PAXTON MERINO is a 89y (07-Sep-1930) woman with a PMHx Afib on eliquis 9last dose taken last night), HTN, IBS, osteoporosis, anemia, falls, presents to [facility] ED as a code stroke for difficulty walking and slurred speech, LKN 9PM 6/19 before she went to bed; patient woke up at 2AM and was noted by night aide to have difficulty with ambulation, also noted by family later in the day to have a bruise on her R arm. She denied fall.  In ED, NIHSS 2 for one question incorrect, and mild dysarthria. Patient has ?R NLF that corrects with smiling, but family states this is her baseline. Patient reports some difficulty swallowing and generalized weakness. Patient's son states that her speech is not as fluent as usual and sounds like her tongue is in her cheek.      Review of Systems:    CONSTITUTIONAL: No fevers or chills  EYES AND ENT: No visual changes or no throat pain   NECK: No pain or stiffness  RESPIRATORY: No hemoptysis or shortness of breath  CARDIOVASCULAR: No chest pain or palpitations  GASTROINTESTINAL: No melena or hematochezia  GENITOURINARY: No dysuria or hematuria  NEUROLOGICAL: +As stated in HPI above  SKIN: No itching, burning, rashes, or lesions        Allergies:  sulfa drugs (Unknown)      PMHx/PSHx/Family Hx: As above, otherwise see below   History of IBS  Osteoporosis  Essential hypertension      Social Hx:  No current use of tobacco, alcohol, or illicit drugs  Lives at home, has 24 hr aide    Medications:  MEDICATIONS  (STANDING):    MEDICATIONS  (PRN):      Vitals:  T(C): 36.7 (06-20-23 @ 17:41), Max: 36.7 (06-20-23 @ 17:41)  HR: 85 (06-20-23 @ 17:41) (85 - 85)  BP: 157/75 (06-20-23 @ 17:41) (157/75 - 157/75)  RR: 18 (06-20-23 @ 17:41) (18 - 18)  SpO2: 98% (06-20-23 @ 17:41) (98% - 98%)    Physical Examination:    General - NAD  Cardiovascular - Peripheral pulses palpable, no edema  Eyes -   Fundoscopy not performed due to safety precautions in the setting of the COVID-19 pandemic    Neurologic Exam:  Mental status - Awake, Alert, Oriented to person, but not to month. Speech mildly dysarthric. Repetition and naming intact. Follows simple  commands.     Cranial nerves - PERRL, VFF, EOMI, face sensation (V1-V3) intact b/l, facial strength intact without asymmetry b/l, hearing intact b/l, palate with symmetric elevation, trapezius  5/5 strength b/l, tongue midline on protrusion with full lateral movement    Motor - decreased bulk and normal tone throughout. No pronator drift.    Strength testing, for patient's habitus:            Deltoid      Biceps      Triceps     Wrist Extension    Wrist Flexion     Interossei         R            5                 5               5                     5                              5                        5                 5  L             5                 5               5                     5                              5                        5                 5              Hip Flexion    Hip Extension    Knee Flexion    Knee Extension       R              5                           5                       5                           5                               L              5                           5                        5                           5                                Sensation - Light touch  intact throughout    DTR's - deferred               Coordination - Finger to Nose intact b/l. Mild tremors appreciated    Gait and station - wide gait, steps seem almost too big    Labs:          CAPILLARY BLOOD GLUCOSE  139 (20 Jun 2023 18:09)      POCT Blood Glucose.: 139 mg/dL (20 Jun 2023 17:43)          CSF:      Radiology:            ACC: [id number] EXAM: CT ANGIO NECK STROKE PROTCL IC ORDERED BY: CAROL YIN    ACC: [id number] EXAM: CT ANGIO BRAIN STROKE PROTC IC ORDERED BY: CAROL YIN    ACC: [id number] EXAM: CT BRAIN PERFUSION MAPS STROKE ORDERED BY: CAROL YIN    PROCEDURE DATE: 06/20/2023        INTERPRETATION: CLINICAL HISTORY: Stroke code. Dysarthria.    TECHNIQUE:  Noncontrast head CT was followed by CT images acquired through the neck and head during the arterial phase.  Intravenous contrast: 100 cc of Omnipaque-350 mg/ml were administered  Three-dimensional MIP reformats were generated.    COMPARISON STUDY: Noncontrast head CT performed the same day..    FINDINGS:  CT ANGIOGRAPHY NECK:  Thoracic aorta and branch vessels: Calcifications of the aortic arch without flow-limiting stenosis.  Right carotid system: Calcified and noncalcified plaque at the carotid bifurcation resulting in less than 50% stenosis using NASCET criteria. The distal ICA is tortuous.  Left carotid system: Calcified and noncalcified plaque at the carotid bifurcation resulting in less than 50% stenosis using NASCET criteria. The distal ICA is tortuous.  Vertebral arteries: No flow-limiting stenosis. The right vertebral artery is dominant.    Soft tissues of the neck: Unremarkable.  Visualized spine: Multilevel degenerative change.  Visualized upper chest: Mosaic attenuation may be related to air trapping.. Severe chronic appearing compression deformity T5 vertebral body    CT ANGIOGRAPHY BRAIN:  Internal carotid arteries: Calcifications of the cavernous segments bilaterally resulting in mild stenosis.  Anterior cerebral arteries: Patent bilaterally. Multifocal regions of narrowing bilaterally moderate on the right and mild on the left  Middle cerebral arteries: Patent bilaterally. Mild multifocal regions of narrowing    Posterior cerebral arteries: Patent bilaterally. Moderate and severe multifocal regions of narrowing  Vertebrobasilar: Patent. Mild to moderate narrowing of the distal basilar artery. The right vertebral artery is dominant.  Dural venous sinuses: Grossly patent.    CT PERFUSION:  During IV contrast administration, serial thin section CT images of the brain were obtained for CT perfusion. The raw data was sent to Rapid Ischemia view for post processing.  FINDINGS: CBF <30: 0 ml  Tmax > 6s: 0 ml  Mismatch volume: 0 ml  Mismatch ratio: 0        IMPRESSION:  CTA Neck: Mild stenosis of the right ICA origin. Markedly tortuous bilateral cervical ICA segments.    CTA Head: No large vessel occlusion about the [place]. Intracranial atherosclerosis as described.    CT Perfusion: No perfusion abnormalities.    --- End of Report ---        ACC: [id number] EXAM: CT BRAIN STROKE PROTOCOL ORDERED BY: CAROL YIN    PROCEDURE DATE: 06/20/2023        INTERPRETATION: INDICATIONS: Stroke Code    TECHNIQUE: Serial axial images were obtained from the skull base to the vertex without intravenous contrast. Coronal and sagittal reformatted images were obtained.    COMPARISON EXAMINATION: 7/11/2022    FINDINGS:  VENTRICLES, SULCI AND BASAL CISTERNS: Symmetric prominence of ventricles and sulci in the setting of cortical volume loss. The basal cisterns are clear.  INTRA-AXIAL: No mass effect, hemorrhage, or vasogenic edema. Extensive bihemispheric white matter microvascular changes.  EXTRA-AXIAL: No mass or collection is seen.  VISUALIZED SINUSES: Clear.  VISUALIZED MASTOIDS: Clear.  CALVARIUM: Normal.  ORBITS: Bilateral lens replacements.      IMPRESSION:  No acute intracranial hemorrhage, mass effect or midline shift.    Age-appropriate ischemic gliotic changes.    Findings were discussed by Dr. Castro with Dr. Ruiz, on 6/20/2023 6:29 PM with read back confirmation.    --- End of Report ---

## 2023-06-20 NOTE — H&P ADULT - PROBLEM SELECTOR PLAN 7
Diet: NPO pending speech and swallow eval  DVT ppx: eliquis  Dispo: pending PT/OT/PM&R eval Diet: NPO pending speech and swallow eval  DVT ppx: Eliquis  Dispo: pending PT/OT/PM&R eval

## 2023-06-20 NOTE — CONSULT NOTE ADULT - ASSESSMENT
INCOMPLETE    Assessment: ***. Initial VS in ED: ***. Exam: ***.      mRS: ***  LKN: ***  NIHSS: ***    Impression: ***    Recommendations:  [] MRI brain without contrast   [] Lipid panel, HbA1c  [] CBC, CMP, coagulation panel, troponin  [] Continue with home eliquis  [] Atorvastatin 80mg (titrate to LDL < 70)   [] DVT prophylaxis per primary team  [] bedside swallow eval  [] Keep BP permissive up to 220/110 for 48 hours followed by gradual normotension over 2-3 days   [] Telemonitoring  [] Neurological checks and vital signs per unit protocol  [] BGM goals 140-180  [] PT/OT; S/S evaluation    * Case and plan not final until Attending attestation * Assessment: 89y (07-Sep-1930) woman with a PMHx Afib on eliquis 9last dose taken last night), HTN, IBS, osteoporosis, anemia, falls, presents to [facility] ED as a code stroke for difficulty walking and slurred speech, LKN 9PM 6/19 before she went to bed; patient woke up at 2AM and was noted by night aide to have difficulty with ambulation, also noted by family later in the day to have a bruise on her R arm. She denied fall.  In ED, NIHSS 2 for one question incorrect, and mild dysarthria. Patient has ?R NLF that corrects with smiling, but family states this is her baseline. Patient reports some difficulty swallowing and generalized weakness. Patient's son states that her speech is not as fluent as usual and sounds like her tongue is in her cheek.    mRS: 3 (usually walks with assistance of people, lives at home with 24 hr aides)  LKN: 2100 6/19/23  NIHSS: 2    CTH: No acute intracranial hemorrhage, mass effect or midline shift. Age-appropriate ischemic gliotic changes.  CTA Neck: Mild stenosis of the right ICA origin. Markedly tortuous bilateral cervical ICA segments.  CTA Head: No large vessel occlusion about the [place]. Intracranial atherosclerosis as described.  CT Perfusion: No perfusion abnormalities.    Impression: Dysarthria, difficulty with ambulation, difficulty with swallowing, unclear etiology, would evaluate for encephalopathy due to toxic metabolic infectious etiologies, will get imaging to rule out stroke (less likely given no lateralizing deficits on exam)    Recommendations:  [] MRI brain without contrast   [] toxic metabolic infectious workup per primary team  [] Lipid panel, HbA1c  [] CBC, CMP, coagulation panel, troponin  [] Continue with home eliquis  [] Atorvastatin 80mg (titrate to LDL < 70)   [] DVT prophylaxis per primary team  [] bedside swallow eval  [] NPO until bedside swallow eval passed  [] Keep BP permissive up to 220/110 for 48 hours followed by gradual normotension over 2-3 days   [] Telemonitoring  [] Neurological checks and vital signs per unit protocol  [] BGM goals 140-180  [] PT/OT; S/S evaluation    Attempted to reach Telestroke attending on call for further dispo; will re-attempt call again.   * Case and plan not final until Attending attestation * Assessment: 89y (07-Sep-1930) woman with a PMHx Afib on eliquis 9last dose taken last night), HTN, IBS, osteoporosis, anemia, falls, presents to [facility] ED as a code stroke for difficulty walking and slurred speech, LKN 9PM 6/19 before she went to bed; patient woke up at 2AM and was noted by night aide to have difficulty with ambulation, also noted by family later in the day to have a bruise on her R arm. She denied fall.  In ED, NIHSS 2 for one question incorrect, and mild dysarthria. Patient has ?R NLF that corrects with smiling, but family states this is her baseline. Patient reports some difficulty swallowing and generalized weakness. Patient's son states that her speech is not as fluent as usual and sounds like her tongue is in her cheek.    mRS: 3 (usually walks with assistance of people, lives at home with 24 hr aides)  LKN: 2100 6/19/23  NIHSS: 2    CTH: No acute intracranial hemorrhage, mass effect or midline shift. Age-appropriate ischemic gliotic changes.  CTA Neck: Mild stenosis of the right ICA origin. Markedly tortuous bilateral cervical ICA segments.  CTA Head: No large vessel occlusion about the [place]. Intracranial atherosclerosis as described.  CT Perfusion: No perfusion abnormalities.    Impression: Dysarthria, difficulty with ambulation, difficulty with swallowing, unclear etiology, would evaluate for encephalopathy due to toxic metabolic infectious etiologies, will get imaging to rule out stroke (less likely given no lateralizing deficits on exam)    Recommendations:  [] CDU for MRI brain without contrast   [] Toxic metabolic infectious workup per primary team  [] Lipid panel, HbA1c  [] CBC, CMP, coagulation panel, troponin  [] Continue with home eliquis  [] Atorvastatin 80mg (titrate to LDL < 70)   [] DVT prophylaxis per primary team  [] bedside swallow eval  [] NPO until bedside swallow eval passed  [] Keep BP permissive up to 220/110 for 48 hours followed by gradual normotension over 2-3 days   [] Telemonitoring  [] Neurological checks and vital signs per unit protocol  [] BGM goals 140-180  [] PT/OT; S/S evaluation    Case and plan discussed with stroke fellow on call, Dr. Mendez, under supervision of stroke attending.  * Case and plan not final until Attending attestation *

## 2023-06-20 NOTE — ED PROVIDER NOTE - PHYSICAL EXAMINATION
Physical Exam:  Gen: NAD, AOx3, non-toxic appearing  Head: NCAT  HEENT: EOMI, PEERLA, normal conjunctiva, tongue midline, oral mucosa moist  Lung: CTAB, no respiratory distress, no wheezes/rhonchi/rales B/L, speaking in full sentences  CV: RRR  Abd: soft, NT, ND, no guarding, no rigidity, no rebound tenderness, no CVA tenderness   MSK: no visible deformities, ROM normal in UE/LE, no back pain  Neuro: No focal sensory or motor deficits  Skin: Warm, well perfused, no rash, no leg swelling  Psych: normal affect, calm

## 2023-06-20 NOTE — H&P ADULT - PROBLEM SELECTOR PLAN 1
Arrived initially as code stroke for slurred speech and difficulty with ambulation. Code stroke initial workup with CTH, CTA, CT perfusion unremarkable. No lateralizing neurologic findings, less likely for stroke.   Symptoms may be related to infectious etiology as pt endorsing dysuria as well as +UA. Also with hazy opacity in R lower lung    - c/w ceftriaxone and azithromycin   - f/u Bcx, UCx  - f/u MR brain  - permissive hypertension to 220/110 for 48h  - atorvastatin 80 mg   - NPO until speech and swallow eval   - monitor on tele  - neurology following, appreciate reccs Arrived initially as code stroke for slurred speech and difficulty with ambulation. Code stroke initial workup with CTH, CTA, CT perfusion unremarkable. No lateralizing neurologic findings, less likely for stroke.   Symptoms may be related to infectious etiology as pt endorsing dysuria as well as +UA. Also with hazy opacity in R lower lung  Less likely aspiration, no hx of dysphagia, no witnessed choking events     - c/w ceftriaxone and azithromycin (6/20-  - f/u procal, if normal will dc azithro  - f/u Bcx, UCx  - f/u MR brain  - permissive hypertension to 220/110 for 48h  - atorvastatin 80 mg   - NPO until speech and swallow eval   - monitor on tele  - neurology following, appreciate reccs  - PT/OT/PM&R consult  - q4h vitals and neuro checks Arrived initially as code stroke for slurred speech and difficulty with ambulation. Code stroke initial workup with CTH, CTA, CT perfusion unremarkable.   Pt with progressive R sided weakness during ED stay. LKN > 4.5 not a TPA candidate    - f/u MR brain  - TTE with bubble study  - q4h neuro checks and vitals  - permissive hypertension up to 220/110 for 48h  - NPO until speech and swallow eval  - atorvastatin 80 mg   - neurology following, appreciate reccs  - PT/OT/PM&R consult Arrived initially as code stroke for slurred speech and difficulty with ambulation. Code stroke initial workup with CTH noncontrast, CTA H/N with IV contrast, CT perfusion unremarkable.   Pt with progressive R sided weakness during ED stay. LKN > 4.5 hrs ago, not a TPA candidate.    - Neurology following, appreciate reccs  - F/u MRI brain w/o contrast  - TTE with bubble study ordered  - Monitor on tele  - Q4hr neuro checks and vital signs  - Permissive hypertension up to 220/110 mmHg for 48hrs from symptom onset  - Dysphagia screen passed, started on pureed diet. However, made NPO again after worsening symptoms overnight. NPO until speech and swallow eval.  - Start atorvastatin 80 mg qHS   - PT/OT/PM&R consults  - Aspiration precautions and fall risk protocol

## 2023-06-20 NOTE — H&P ADULT - HISTORY OF PRESENT ILLNESS
89F with PMH AFib on elqiuis, HTN, anemia, presenting with difficulty with walking and slurred speech x 1 day. Her last known normal was 6/19 at 9PM. She woke up later at 2AM and was noted by her home aide to have difficulty with ambulation    On arrival to ED, VSS. Code stroke called. Initial imaging with CTH, CTA head, CT perfusion unremarkable for acute pathology. Given azithromycin and ceftriaxone. Admitted to medicine for further workup and management.  89F with PMH AFib on elqiuis, HTN, anemia, presenting with difficulty with walking and slurred speech x 1 day. Her last known normal was 6/19 at 9PM. Per 24/7 HHA, she woke up the following morning at 9AM with slurred speech and difficulty walking. At baseline, she is highly functional, and can ambulate without any assistance or devices. She has never had this happen to her before. She does not have any personal or family history of stroke or TIA. Over the last several days, she has been reporting dysuria but denies any fevers, chills, or cough.   She reports she still feels like she is not speaking as clearly as she normally does.     On arrival to ED, VSS. Code stroke called. Initial imaging with CTH, CTA head, CT perfusion unremarkable for acute pathology. UA with 121 WBC and moderate bacteria. CXR with hazy opacity in right lung base. Given azithromycin and ceftriaxone. Admitted to medicine for further workup and management.  89 yo F with PMH of AFib on Eliquis, HTN, IBS, and anemia, presenting with difficulty with walking and slurred speech x 1 day. Her last known normal was 6/19/23 at 9PM. Per 24/7 HHA, she woke up the following morning at 9AM with slurred speech and difficulty walking. At baseline, she is highly functional and can ambulate without any assistance or devices. She has never had this happen to her before. She does not have any personal or family history of stroke or TIA. Over the last several days, she has been reporting dysuria but denies any fevers, chills, or cough. She reports she still feels like she is not speaking as clearly as she normally does.     On arrival to ED, VSS. Code stroke called. Initial imaging with CTH, CTA head, CT perfusion unremarkable for acute pathology. UA with 121 WBC and moderate bacteria. CXR with hazy opacity in right lung base. Given azithromycin and ceftriaxone. Admitted to medicine for further workup and management.

## 2023-06-20 NOTE — H&P ADULT - NSHPREVIEWOFSYSTEMS_GEN_ALL_CORE
REVIEW OF SYSTEMS      General:	  No fever, chills, or night sweats    Skin/Breast:  No rash or erythema  	  Ophthalmologic:  No double or blurry vision  	  ENMT:	  No sore throat    Respiratory and Thorax:  No cough, no shortness of breath, no wheezing  	  Cardiovascular:	  No chest pain, no palpitations    Gastrointestinal:	  No N/V/D/C, bloody or black stools    Genitourinary:	  +burning with urination and lower abdominal pain, no hematuria    Musculoskeletal:	  No joint pain    Neurological:	  slurred speech and difficulty with ambulation as in HPI General:	  No fever, chills, or night sweats    Skin/Breast:  No rash or erythema  	  Ophthalmologic:  No double or blurry vision  	  ENMT:	  No sore throat    Respiratory and Thorax:  No cough, no shortness of breath, no wheezing  	  Cardiovascular:	  No chest pain, no palpitations    Gastrointestinal:	  No N/V/D/C, bloody or black stools    Genitourinary:	  +Burning with urination and lower abdominal pain, no hematuria    Musculoskeletal:	  No joint pain    Neurological:	  Slurred speech and difficulty with ambulation as in HPI

## 2023-06-20 NOTE — CONSULT NOTE ADULT - TIME BILLING
89-year-old ? handed lady evaluated at [facility] on 6/21/2023 with speech disturbance.  History and exam as above, with minor changes.  ROS otherwise negative.  Exam.  Alert and attentive.  Speech possibly subtly disfluent, possibly with rare semantic paraphasias or circumlocutions; slight flattening of right nasolabial fold; mild dysarthria; right deltoid 3/5 (probably orthopedic); left leg-minimal effort versus gravity (probably pain related or chronic); gait not tested; remainder of neurologic exam was nonfocal.  CT head (6/20/2023) to my eye showed moderate-severe periventricular and subcortical chronic ischemic changes.  CTA neck (6/20/2023) to my eye showed mild calcified plaque at the carotid bifurcations bilaterally.  CTA head (6/20/2023) to my eye showed calcified plaque with stenosis: Right carotid siphon and right M2-moderate; left M2-mild; distal basilar-mild or mild-moderate.    Impression.  Mild dysarthria and possibly a mild motor aphasia, along with gait difficulty.  Depending on whether or not she is actually aphasic, her presentation may localize to the left hemisphere or may be relatively nonlocalizing.  Etiology may be ischemic stroke of uncertain mechanism, as there are several possible competing mechanisms, i.e. cardioembolism related to atrial fibrillation versus small vessel disease versus intracranial large artery atherosclerosis (albeit the intracranial atherosclerosis is not severe).  Suggest.  Repeat CT head; MRI can be done electively or avoided and should not delay discharge; continue Eliquis; formal swallowing evaluation; depending on formal swallowing evaluation, from neurovascular standpoint, cleared for discharge.

## 2023-06-20 NOTE — ED ADULT TRIAGE NOTE - CHIEF COMPLAINT QUOTE
p.t on Eliquis for A fib, c/o of headache, dizziness and slurred speech since this am, onset 2 am, p.t evaluated by  code stroke initiated

## 2023-06-20 NOTE — ED PROVIDER NOTE - PROGRESS NOTE DETAILS
Fermin Mackey PGY1: Family states the Pt has a 24hr aid and they would rather take the Pt home and have the MRI down in the outpt setting. Will get UA and then reassess and have a shared decision making conversation. Chest x-ray demonstrated a right lower lobe opacity which we will treat as community-acquired pneumonia with ceftriaxone and azithromycin.  EKG is pending, will assess for QT prolongation.  Discussed with the patient and the family, given patient's weakness need for MRI and new pneumonia will have admitted to the hospital under telemetry.  Endorsed to the hospitalist accept the patient to their service

## 2023-06-20 NOTE — ED PROVIDER NOTE - OBJECTIVE STATEMENT
89 yo F with hx of Afib on Eliquis, HTN, IBS, osteoporosis, anemia p/w slurred speech and difficulty ambulating. Code stroke on arrival. LKN 9 PM. Patient went to sleep in usual state of health. Woke up at around 2 AM with difficulty speaking and walking. She was alone at that time but her sons noted when they saw her that she had a bruise of her R wrist. Patient denies fall or any additional symptoms.

## 2023-06-20 NOTE — H&P ADULT - PROBLEM SELECTOR PLAN 4
- c/w home eliquis CHADSVASC score 6 (if counting stroke hx from this admission)    - c/w home eliquis Baseline Cr 1.26 in July 2022, on admission 1.55. Concern for pre-renal LIAM vs. CKD    - f/u urine studies  - monitor SCr  - renally dose meds; avoid nephrotoxic agents Baseline SCr 1.26 in July 2022, on admission 1.55. Concern for pre-renal LIAM vs. CKD.    - F/u urine studies  - Monitor SCr  - Avoid NSAIDs and nephrotoxic agents  - Renally dose meds

## 2023-06-20 NOTE — H&P ADULT - NSHPLABSRESULTS_GEN_ALL_CORE
LABS:                        11.3   9.53  )-----------( 195      ( 20 Jun 2023 18:12 )             35.0     06-20    135  |  102  |  32<H>  ----------------------------<  129<H>  4.5   |  20<L>  |  1.55<H>    Ca    9.6      20 Jun 2023 18:12    TPro  6.8  /  Alb  4.0  /  TBili  <0.2  /  DBili  x   /  AST  22  /  ALT  17  /  AlkPhos  56  06-20    PT/INR - ( 20 Jun 2023 18:12 )   PT: 11.6 sec;   INR: 1.00 ratio         PTT - ( 20 Jun 2023 18:12 )  PTT:28.1 sec Labs personally reviewed.                        11.3   9.53  )-----------( 195      ( 20 Jun 2023 18:12 )             35.0     06-20    135  |  102  |  32<H>  ----------------------------<  129<H>  4.5   |  20<L>  |  1.55<H>    Ca    9.6      20 Jun 2023 18:12    TPro  6.8  /  Alb  4.0  /  TBili  <0.2  /  DBili  x   /  AST  22  /  ALT  17  /  AlkPhos  56  06-20    PT/INR - ( 20 Jun 2023 18:12 )   PT: 11.6 sec;   INR: 1.00 ratio    PTT - ( 20 Jun 2023 18:12 )  PTT:28.1 sec    Imaging personally reviewed.  ACC: [id number] EXAM:  CT ANGIO NECK STROKE PROTCL IC   ORDERED BY: CAROL YIN   ACC: [id number] EXAM:  CT ANGIO BRAIN STROKE PROTC IC   ORDERED BY: CAROL YIN   ACC: [id number] EXAM:  CT BRAIN PERFUSION MAPS STROKE   ORDERED BY: CAROL YIN   PROCEDURE DATE:  06/20/2023    FINDINGS:  CT ANGIOGRAPHY NECK:  Thoracic aorta and branch vessels: Calcifications of the aortic arch   without flow-limiting stenosis.  Right carotid system: Calcified and noncalcified plaque at the carotid   bifurcation resulting in less than 50% stenosis using NASCET criteria.   The distal ICA is tortuous.  Left carotid system: Calcified and noncalcified plaque at the carotid   bifurcation resulting in less than 50% stenosis using NASCET criteria.   The distal ICA is tortuous.  Vertebral arteries: No flow-limiting stenosis. The right vertebral artery   is dominant.    Soft tissues of the neck: Unremarkable.  Visualized spine: Multilevel degenerative change.  Visualized upper chest: Mosaic attenuation may be related to air   trapping.. Severe chronic appearing compression deformity T5 vertebral   body    CT ANGIOGRAPHY BRAIN:  Internal carotid arteries: Calcifications of the cavernous segments   bilaterally resulting in mild stenosis.  Anterior cerebral arteries: Patent bilaterally. Multifocal regions of   narrowing bilaterally moderate on the right and mild on the left  Middle cerebral arteries: Patent bilaterally. Mild multifocal regions of   narrowing    Posterior cerebral arteries: Patent bilaterally. Moderate and severe   multifocal regions of narrowing  Vertebrobasilar: Patent. Mild to moderate narrowingof the distal basilar   artery. The right vertebral artery is dominant.  Dural venous sinuses: Grossly patent.    IMPRESSION:  CTA Neck: Mild stenosis of the right ICA origin. Markedly tortuous bilateral cervical ICA segments.  CTA Head: No large vessel occlusion about the [place] of [place]. Intracranial atherosclerosis as described.  CT Perfusion: No perfusion abnormalities.    ACC: [id number] EXAM:  XR WRIST 2 VIEWS RT   ORDERED BY: CAROL YIN   ACC: [id number] EXAM:  XR HAND 2 VIEWS RT   ORDERED BY: CAROL YIN   ACC: [id number] EXAM:  XR FOREARM 2 VIEWS RT   ORDERED BY: CAROL YIN   PROCEDURE DATE:  06/20/2023  FINDINGS:  No acute fractures or dislocations.  Multilevel joint space narrowing most prominent at the fifth DIP joint, basilar joint, radiocarpal and radioulnar joints.  A peripheral IV is noted at the distal aspect of the forearm with intraluminal and superficial hyperdense material likely representing contrast material.  Bones are osteopenic.    IMPRESSION:  No acute fracture or dislocations.

## 2023-06-20 NOTE — STROKE CODE NOTE - NIH STROKE SCALE: 5B. MOTOR ARM, RIGHT, QM
RN cannot approve Refill Request    RN can NOT refill this medication Protocol failed and NO refill given. Last office visit: 11/25/2019 Ethan Colunga MD Last Physical: 6/6/2017 Last MTM visit: Visit date not found Last visit same specialty: 11/25/2019 Ethan Colunga MD.  Next visit within 3 mo: Visit date not found  Next physical within 3 mo: Visit date not found      Milan Sandoval, [facility] Triage/Med Refill 5/5/2021    Requested Prescriptions   Pending Prescriptions Disp Refills     lisinopriL (PRINIVIL,ZESTRIL) 40 MG tablet [Pharmacy Med Name: LISINOPRIL 40 MG TABLET] 90 tablet 0     Sig: TAKE 1 TABLET BY MOUTH EVERY DAY       Ace Inhibitors Refill Protocol Failed - 5/5/2021 12:37 AM        Failed - Serum Potassium in past 12 months     No results found for: LN-POTASSIUM          Failed - Blood pressure filed in past 12 months     BP Readings from Last 1 Encounters:   11/25/19 116/58             Failed - Serum Creatinine in past 12 months     Creatinine   Date Value Ref Range Status   08/21/2019 1.06 0.70 - 1.30 mg/dL Final             Passed - PCP or prescribing provider visit in past 12 months       Last office visit with prescriber/PCP: 11/25/2019 Ethan Colunga MD OR same dept: Visit date not found OR same specialty: 11/25/2019 Ethan Colunga MD  Last physical: 6/6/2017 Last MTM visit: Visit date not found   Next visit within 3 mo: Visit date not found  Next physical within 3 mo: Visit date not found  Prescriber OR PCP: Ethan Colunga MD  Last diagnosis associated with med order: 1. Essential hypertension  - lisinopriL (PRINIVIL,ZESTRIL) 40 MG tablet [Pharmacy Med Name: LISINOPRIL 40 MG TABLET]; TAKE 1 TABLET BY MOUTH EVERY DAY  Dispense: 90 tablet; Refill: 0    If protocol passes may refill for 12 months if within 3 months of last provider visit (or a total of 15 months).                   
(0) No drift; limb holds 90 (or 45) degrees for full 10 secs

## 2023-06-21 DIAGNOSIS — R47.1 DYSARTHRIA AND ANARTHRIA: ICD-10-CM

## 2023-06-21 LAB
A1C WITH ESTIMATED AVERAGE GLUCOSE RESULT: 5.2 % — SIGNIFICANT CHANGE UP (ref 4–5.6)
ALBUMIN SERPL ELPH-MCNC: 3.6 G/DL — SIGNIFICANT CHANGE UP (ref 3.3–5)
ALP SERPL-CCNC: 47 U/L — SIGNIFICANT CHANGE UP (ref 40–120)
ALT FLD-CCNC: 14 U/L — SIGNIFICANT CHANGE UP (ref 4–33)
ANION GAP SERPL CALC-SCNC: 13 MMOL/L — SIGNIFICANT CHANGE UP (ref 7–14)
AST SERPL-CCNC: 21 U/L — SIGNIFICANT CHANGE UP (ref 4–32)
BILIRUB SERPL-MCNC: 0.2 MG/DL — SIGNIFICANT CHANGE UP (ref 0.2–1.2)
BUN SERPL-MCNC: 27 MG/DL — HIGH (ref 7–23)
CALCIUM SERPL-MCNC: 8.8 MG/DL — SIGNIFICANT CHANGE UP (ref 8.4–10.5)
CHLORIDE SERPL-SCNC: 101 MMOL/L — SIGNIFICANT CHANGE UP (ref 98–107)
CHOLEST SERPL-MCNC: 191 MG/DL — SIGNIFICANT CHANGE UP
CO2 SERPL-SCNC: 18 MMOL/L — LOW (ref 22–31)
CREAT SERPL-MCNC: 1.33 MG/DL — HIGH (ref 0.5–1.3)
EGFR: 38 ML/MIN/1.73M2 — LOW
ESTIMATED AVERAGE GLUCOSE: 103 — SIGNIFICANT CHANGE UP
GLUCOSE SERPL-MCNC: 139 MG/DL — HIGH (ref 70–99)
HCT VFR BLD CALC: 31.5 % — LOW (ref 34.5–45)
HDLC SERPL-MCNC: 56 MG/DL — SIGNIFICANT CHANGE UP
HGB BLD-MCNC: 10.5 G/DL — LOW (ref 11.5–15.5)
LIPID PNL WITH DIRECT LDL SERPL: 124 MG/DL — HIGH
MAGNESIUM SERPL-MCNC: 1.9 MG/DL — SIGNIFICANT CHANGE UP (ref 1.6–2.6)
MCHC RBC-ENTMCNC: 31.1 PG — SIGNIFICANT CHANGE UP (ref 27–34)
MCHC RBC-ENTMCNC: 33.3 GM/DL — SIGNIFICANT CHANGE UP (ref 32–36)
MCV RBC AUTO: 93.2 FL — SIGNIFICANT CHANGE UP (ref 80–100)
NON HDL CHOLESTEROL: 135 MG/DL — HIGH
NRBC # BLD: 0 /100 WBCS — SIGNIFICANT CHANGE UP (ref 0–0)
NRBC # FLD: 0 K/UL — SIGNIFICANT CHANGE UP (ref 0–0)
PHOSPHATE SERPL-MCNC: 3.1 MG/DL — SIGNIFICANT CHANGE UP (ref 2.5–4.5)
PLATELET # BLD AUTO: 188 K/UL — SIGNIFICANT CHANGE UP (ref 150–400)
POTASSIUM SERPL-MCNC: 4.3 MMOL/L — SIGNIFICANT CHANGE UP (ref 3.5–5.3)
POTASSIUM SERPL-SCNC: 4.3 MMOL/L — SIGNIFICANT CHANGE UP (ref 3.5–5.3)
PROT SERPL-MCNC: 6.6 G/DL — SIGNIFICANT CHANGE UP (ref 6–8.3)
RBC # BLD: 3.38 M/UL — LOW (ref 3.8–5.2)
RBC # FLD: 13.9 % — SIGNIFICANT CHANGE UP (ref 10.3–14.5)
SODIUM SERPL-SCNC: 132 MMOL/L — LOW (ref 135–145)
TRIGL SERPL-MCNC: 53 MG/DL — SIGNIFICANT CHANGE UP
WBC # BLD: 14.24 K/UL — HIGH (ref 3.8–10.5)
WBC # FLD AUTO: 14.24 K/UL — HIGH (ref 3.8–10.5)

## 2023-06-21 PROCEDURE — 99222 1ST HOSP IP/OBS MODERATE 55: CPT

## 2023-06-21 PROCEDURE — 99223 1ST HOSP IP/OBS HIGH 75: CPT

## 2023-06-21 PROCEDURE — 99233 SBSQ HOSP IP/OBS HIGH 50: CPT

## 2023-06-21 RX ORDER — AZITHROMYCIN 500 MG/1
500 TABLET, FILM COATED ORAL EVERY 24 HOURS
Refills: 0 | Status: COMPLETED | OUTPATIENT
Start: 2023-06-21 | End: 2023-06-22

## 2023-06-21 RX ORDER — SODIUM CHLORIDE 9 MG/ML
1000 INJECTION, SOLUTION INTRAVENOUS
Refills: 0 | Status: DISCONTINUED | OUTPATIENT
Start: 2023-06-21 | End: 2023-06-22

## 2023-06-21 RX ADMIN — APIXABAN 2.5 MILLIGRAM(S): 2.5 TABLET, FILM COATED ORAL at 17:58

## 2023-06-21 RX ADMIN — ATORVASTATIN CALCIUM 80 MILLIGRAM(S): 80 TABLET, FILM COATED ORAL at 21:48

## 2023-06-21 RX ADMIN — SODIUM CHLORIDE 50 MILLILITER(S): 9 INJECTION, SOLUTION INTRAVENOUS at 16:06

## 2023-06-21 RX ADMIN — APIXABAN 2.5 MILLIGRAM(S): 2.5 TABLET, FILM COATED ORAL at 05:27

## 2023-06-21 RX ADMIN — AZITHROMYCIN 255 MILLIGRAM(S): 500 TABLET, FILM COATED ORAL at 14:07

## 2023-06-21 RX ADMIN — CEFTRIAXONE 100 MILLIGRAM(S): 500 INJECTION, POWDER, FOR SOLUTION INTRAMUSCULAR; INTRAVENOUS at 11:44

## 2023-06-21 NOTE — SWALLOW BEDSIDE ASSESSMENT ADULT - SWALLOW EVAL: DIAGNOSIS
1. Functional oral stage for puree, mildly thick liquids and thin liquids marked by adequate oral acceptance, collection and transfer. 2. Moderate pharyngeal dysphagia for puree, mildly thick liquids and thin liquids marked by a suspected delayed pharyngeal swallow trigger with hyolaryngeal elevation noted upon digital palpation with delayed coughing for puree/mildly thick liquids and immediate strong coughing for thin liquids.

## 2023-06-21 NOTE — CONSULT NOTE ADULT - SUBJECTIVE AND OBJECTIVE BOX
CHIEF COMPLAINT: leg numbness     HISTORY OF PRESENT ILLNESS:  89F with PMH AFib on elqiuis, HTN, anemia, presenting with difficulty with walking and slurred speech x 1 day. Her last known normal was 6/19 at 9PM. Per 24/7 HHA, she woke up the following morning at 9AM with slurred speech and difficulty walking. At baseline, she is highly functional, and can ambulate without any assistance or devices. She has never had this happen to her before. She does not have any personal or family history of stroke or TIA. Over the last several days, she has been reporting dysuria but denies any fevers, chills, or cough.   She reports she still feels like she is not speaking as clearly as she normally does.     On arrival to ED, VSS. Code stroke called. Initial imaging with CTH, CTA head, CT perfusion unremarkable for acute pathology. UA with 121 WBC and moderate bacteria. CXR with hazy opacity in right lung base. Given azithromycin and ceftriaxone. Admitted to medicine for further workup and management.       Allergies    sulfa drugs (Unknown)    Intolerances    	    MEDICATIONS:  apixaban 2.5 milliGRAM(s) Oral every 12 hours    azithromycin  IVPB 500 milliGRAM(s) IV Intermittent every 24 hours  cefTRIAXone   IVPB 1000 milliGRAM(s) IV Intermittent every 24 hours          atorvastatin 80 milliGRAM(s) Oral at bedtime        PAST MEDICAL & SURGICAL HISTORY:  History of IBS      Osteoporosis      Essential hypertension      No significant past surgical history          FAMILY HISTORY:  No pertinent family history in first degree relatives        SOCIAL HISTORY:    non smoker. indep in adl    REVIEW OF SYSTEMS:  See HPI, otherwise complete 10 point review of systems negative    [ ] All others negative	      PHYSICAL EXAM:  T(C): 37 (06-21-23 @ 05:44), Max: 37 (06-21-23 @ 05:44)  HR: 77 (06-21-23 @ 05:44) (77 - 85)  BP: 113/82 (06-21-23 @ 05:44) (113/82 - 157/75)  RR: 18 (06-21-23 @ 05:44) (18 - 18)  SpO2: 97% (06-21-23 @ 05:44) (97% - 99%)  Wt(kg): --  I&O's Summary      Appearance: No Acute Distress	  HEENT:  Normal oral mucosa, PERRL, EOMI	  Cardiovascular: Normal S1 S2, No JVD, 2/6 donna  Respiratory: Lungs clear to auscultation bilaterally  Gastrointestinal:  Soft, Non-tender, + BS	  Skin: No rashes, No ecchymoses, No cyanosis	  Neurologic: see neuro note  Extremities: No clubbing, cyanosis or edema  Vascular: Peripheral pulses palpable 2+ bilaterally  Psychiatry: A & O x 3, Mood & affect appropriate    Laboratory Data:	 	    CBC Full  -  ( 21 Jun 2023 05:21 )  WBC Count : 14.24 K/uL  Hemoglobin : 10.5 g/dL  Hematocrit : 31.5 %  Platelet Count - Automated : 188 K/uL  Mean Cell Volume : 93.2 fL  Mean Cell Hemoglobin : 31.1 pg  Mean Cell Hemoglobin Concentration : 33.3 gm/dL  Auto Neutrophil # : x  Auto Lymphocyte # : x  Auto Monocyte # : x  Auto Eosinophil # : x  Auto Basophil # : x  Auto Neutrophil % : x  Auto Lymphocyte % : x  Auto Monocyte % : x  Auto Eosinophil % : x  Auto Basophil % : x    06-21    132<L>  |  101  |  27<H>  ----------------------------<  139<H>  4.3   |  18<L>  |  1.33<H>  06-20    135  |  102  |  32<H>  ----------------------------<  129<H>  4.5   |  20<L>  |  1.55<H>    Ca    8.8      21 Jun 2023 05:21  Ca    9.6      20 Jun 2023 18:12  Phos  3.1     06-21  Mg     1.90     06-21    TPro  6.6  /  Alb  3.6  /  TBili  0.2  /  DBili  x   /  AST  21  /  ALT  14  /  AlkPhos  47  06-21  TPro  6.8  /  Alb  4.0  /  TBili  <0.2  /  DBili  x   /  AST  22  /  ALT  17  /  AlkPhos  56  06-20      proBNP:   Lipid Profile:   HgA1c:   TSH:       CARDIAC MARKERS:            Interpretation of Telemetry: 	    ECG:  	  RADIOLOGY:  OTHER: 	    PREVIOUS DIAGNOSTIC TESTING:    [ ] Echocardiogram:  [ ] Catheterization:  [ ] Stress Test:  	  MPRESSION:    CTA Neck: Mild stenosis of the right ICA origin. Markedly tortuous   bilateral cervical ICA segments.    CTA Head: No large vessel occlusion about the [place] [facility] [place].   Intracranial atherosclerosis as described.    CT Perfusion: No perfusion abnormalities.      Assessment:  89F with PMH AFib on elqiuis, HTN, anemia, presenting with difficulty with walking and slurred speech x 1 day    Recs:  cardiac stable  on AC with eliquis, cont as able. f/u serial brain imaging to rule post hemorrhagic conversion  cw high intensity statin and suggest antiplatelet agent if not otherwise contraindicated given CTA findings  tele monitoring  TTE to r/o csoe and f/u on AS severity  f/u neuro recs  frequent neuro checks        Advanced care planning forms were discussed. Code status including forceful chest compressions, defibrillation and intubation were discussed. The risks benefits and alternatives to pertinent cardiac procedures and interventions were discussed in detail and all questions were answered. Duration: 15 minutes.  Greater than 90 minutes spent on total encounter; more than 50% of the visit was spent counseling and/or coordinating care by the attending physician.   	  Cm Cox MD   Cardiovascular Diseases  [phone]

## 2023-06-21 NOTE — ED ADULT NURSE REASSESSMENT NOTE - NSFALLHARMRISKINTERV_ED_ALL_ED
Assistance OOB with selected safe patient handling equipment if applicable/Assistance with ambulation/Communicate risk of Fall with Harm to all staff, patient, and family/Monitor gait and stability/Provide visual cue: red socks, yellow wristband, yellow gown, etc/Reinforce activity limits and safety measures with patient and family/Bed in lowest position, wheels locked, appropriate side rails in place/Call bell, personal items and telephone in reach/Instruct patient to call for assistance before getting out of bed/chair/stretcher/Non-slip footwear applied when patient is off stretcher/[place] to call system/Physically safe environment - no spills, clutter or unnecessary equipment/Purposeful Proactive Rounding/Room/bathroom lighting operational, light cord in reach

## 2023-06-21 NOTE — CHART NOTE - NSCHARTNOTEFT_GEN_A_CORE
Provider spoke with neurology. Per neuro MRI will not change current  treatment plan- Patient can have CT head no con in lieu. MRI should not delay discharge planning.

## 2023-06-21 NOTE — PHYSICAL THERAPY INITIAL EVALUATION ADULT - MODIFIED CLINICAL TEST OF SENSORY INTEGRATION IN BALANCE TEST
right sided trunk lean in sitting. requires assist to remain upright during dynamic activity in sitting

## 2023-06-21 NOTE — SWALLOW BEDSIDE ASSESSMENT ADULT - COMMENTS
H&P "89 yo F with PMH of AFib on Eliquis, HTN, IBS, and anemia, presenting with difficulty with walking and slurred speech x 1 day. Her last known normal was 6/19/23 at 9PM. Per 24/7 HHA, she woke up the following morning at 9AM with slurred speech and difficulty walking. At baseline, she is highly functional and can ambulate without any assistance or devices. She has never had this happen to her before. She does not have any personal or family history of stroke or TIA. Over the last several days, she has been reporting dysuria but denies any fevers, chills, or cough. She reports she still feels like she is not speaking as clearly as she normally does.     H&P note "Dysphagia screen passed, started on pureed diet. However, made NPO again after worsening symptoms overnight. NPO until speech and swallow eval."    CXR "New hazy opacity in the right lung base consistent with small effusion/atelectasis."    Patient seen at bedside in the [facility] this afternoon for an initial assessment of the swallow function, at which time patient was alert. Patient's family present at bedside and reporting patient performs ADL's independently at baseline. Patient able to follow directives and verbalizes wants/needs. Patient with notably hoarse vocal quality which is not her baseline, per family report.

## 2023-06-21 NOTE — SWALLOW BEDSIDE ASSESSMENT ADULT - PHARYNGEAL PHASE
Delayed pharyngeal swallow/Cough post oral intake Delayed pharyngeal swallow/Delayed cough post oral intake

## 2023-06-21 NOTE — OCCUPATIONAL THERAPY INITIAL EVALUATION ADULT - GENERAL OBSERVATIONS, REHAB EVAL
Patient received semisupine on stretcher in ED in NAD; agreeable to participate in OT evaluation. +IV. Son at bedside.

## 2023-06-21 NOTE — CONSULT NOTE ADULT - SUBJECTIVE AND OBJECTIVE BOX
Patient is a 89y old  Female who presents with a chief complaint of encephalopathy (21 Jun 2023 07:48)      HPI:  89F with PMH AFib on elqiuis, HTN, anemia, presenting with difficulty with walking and slurred speech x 1 day. Her last known normal was 6/19 at 9PM. Per 24/7 HHA, she woke up the following morning at 9AM with slurred speech and difficulty walking. At baseline, she is highly functional, and can ambulate without any assistance or devices. She has never had this happen to her before. She does not have any personal or family history of stroke or TIA. Over the last several days, she has been reporting dysuria but denies any fevers, chills, or cough.   She reports she still feels like she is not speaking as clearly as she normally does.     On arrival to ED, VSS. Code stroke called. Initial imaging with CTH, CTA head, CT perfusion unremarkable for acute pathology. UA with 121 WBC and moderate bacteria. CXR with hazy opacity in right lung base. Given azithromycin and ceftriaxone. Admitted to medicine for further workup and management.  (20 Jun 2023 21:12)      REVIEW OF SYSTEMS  weakness    PAST MEDICAL & SURGICAL HISTORY  History of IBS    Osteoporosis    Essential hypertension    No significant past surgical history        SOCIAL HISTORY  Smoking - Denied  EtOH - Denied   Drugs - Denied    FUNCTIONAL HISTORY  Lives in private house with 4 steps to enter  has 24/7 hha    CURRENT FUNCTIONAL STATUS  pending    FAMILY HISTORY   No pertinent family history in first degree relatives        RECENT LABS/IMAGING  CBC Full  -  ( 21 Jun 2023 05:21 )  WBC Count : 14.24 K/uL  RBC Count : 3.38 M/uL  Hemoglobin : 10.5 g/dL  Hematocrit : 31.5 %  Platelet Count - Automated : 188 K/uL  Mean Cell Volume : 93.2 fL  Mean Cell Hemoglobin : 31.1 pg  Mean Cell Hemoglobin Concentration : 33.3 gm/dL  Auto Neutrophil # : x  Auto Lymphocyte # : x  Auto Monocyte # : x  Auto Eosinophil # : x  Auto Basophil # : x  Auto Neutrophil % : x  Auto Lymphocyte % : x  Auto Monocyte % : x  Auto Eosinophil % : x  Auto Basophil % : x    06-21    132<L>  |  101  |  27<H>  ----------------------------<  139<H>  4.3   |  18<L>  |  1.33<H>    Ca    8.8      21 Jun 2023 05:21  Phos  3.1     06-21  Mg     1.90     06-21    TPro  6.6  /  Alb  3.6  /  TBili  0.2  /  DBili  x   /  AST  21  /  ALT  14  /  AlkPhos  47  06-21    Urinalysis Basic - ( 21 Jun 2023 05:21 )    Color: x / Appearance: x / SG: x / pH: x  Gluc: 139 mg/dL / Ketone: x  / Bili: x / Urobili: x   Blood: x / Protein: x / Nitrite: x   Leuk Esterase: x / RBC: x / WBC x   Sq Epi: x / Non Sq Epi: x / Bacteria: x        VITALS  T(C): 37 (06-21-23 @ 05:44), Max: 37 (06-21-23 @ 05:44)  HR: 77 (06-21-23 @ 05:44) (77 - 85)  BP: 113/82 (06-21-23 @ 05:44) (113/82 - 157/75)  RR: 18 (06-21-23 @ 05:44) (18 - 18)  SpO2: 97% (06-21-23 @ 05:44) (97% - 99%)  Wt(kg): --    ALLERGIES  sulfa drugs (Unknown)      MEDICATIONS   apixaban 2.5 milliGRAM(s) Oral every 12 hours  atorvastatin 80 milliGRAM(s) Oral at bedtime  azithromycin  IVPB 500 milliGRAM(s) IV Intermittent every 24 hours  cefTRIAXone   IVPB 1000 milliGRAM(s) IV Intermittent every 24 hours      ----------------------------------------------------------------------------------------  PHYSICAL EXAM  Constitutional - NAD, Comfortable  HEENT - NCAT, EOMI  Neck - Supple, No limited ROM  Chest - CTA bilaterally, No wheeze, No rhonchi, No crackles  Cardiovascular - RRR, S1S2, No murmurs  Abdomen - BS+, Soft, NTND  Extremities - No C/C/E, No calf tenderness   Neurologic Exam -                    Cognitive - Awake, Alert, AAO to self, place, date, year, situation     Communication - Fluent, No dysarthria     Cranial Nerves - CN 2-12 intact     Motor - No focal deficits                    LEFT    UE - ShAB 5/5, EF 5/5, EE 5/5, WE 5/5,  5/5                    RIGHT UE - ShAB 5/5, EF 5/5, EE 5/5, WE 5/5,  5/5                    LEFT    LE - HF 5/5, KE 5/5, DF 5/5, PF 5/5                    RIGHT LE - HF 5/5, KE 5/5, DF 5/5, PF 5/5        Sensory - Intact to LT     Reflexes - DTR Intact, No primitive reflexive     Coordination - FTN intact     OculoVestibular - No saccades, No nystagmus, VOR         Balance - WNL Static  Psychiatric - Mood stable, Affect WNL  ----------------------------------------------------------------------------------------  ASSESSMENT/PLAN    Pain -  DVT PPX -   Rehab -       incomplete note, consult in progress Patient is a 89y old  Female who presents with a chief complaint of encephalopathy (21 Jun 2023 07:48)      HPI:  89F with PMH AFib on elqiuis, HTN, anemia, presenting with difficulty with walking and slurred speech x 1 day. Her last known normal was 6/19 at 9PM. Per 24/7 HHA, she woke up the following morning at 9AM with slurred speech and difficulty walking. At baseline, she is highly functional, and can ambulate without any assistance or devices. She has never had this happen to her before. She does not have any personal or family history of stroke or TIA. Over the last several days, she has been reporting dysuria but denies any fevers, chills, or cough.   She reports she still feels like she is not speaking as clearly as she normally does.     On arrival to ED, VSS. Code stroke called. Initial imaging with CTH, CTA head, CT perfusion unremarkable for acute pathology. UA with 121 WBC and moderate bacteria. CXR with hazy opacity in right lung base. Given azithromycin and ceftriaxone. Admitted to medicine for further workup and management.  (20 Jun 2023 21:12)    Patient with son at bedside, states she didn't sleep well in ER but denies pain. States she was admitted with difficulty with speech and R sided weakness, feels symptoms may have worsened since onset.     REVIEW OF SYSTEMS  weakness    PAST MEDICAL & SURGICAL HISTORY  History of IBS    Osteoporosis    Essential hypertension    No significant past surgical history        SOCIAL HISTORY  Smoking - Denied  EtOH - Denied   Drugs - Denied    FUNCTIONAL HISTORY  Lives in private house with 6 steps to enter  has 24/7 hha, has RW but doesn't use it    CURRENT FUNCTIONAL STATUS  pending    FAMILY HISTORY   No pertinent family history in first degree relatives        RECENT LABS/IMAGING  CBC Full  -  ( 21 Jun 2023 05:21 )  WBC Count : 14.24 K/uL  RBC Count : 3.38 M/uL  Hemoglobin : 10.5 g/dL  Hematocrit : 31.5 %  Platelet Count - Automated : 188 K/uL  Mean Cell Volume : 93.2 fL  Mean Cell Hemoglobin : 31.1 pg  Mean Cell Hemoglobin Concentration : 33.3 gm/dL  Auto Neutrophil # : x  Auto Lymphocyte # : x  Auto Monocyte # : x  Auto Eosinophil # : x  Auto Basophil # : x  Auto Neutrophil % : x  Auto Lymphocyte % : x  Auto Monocyte % : x  Auto Eosinophil % : x  Auto Basophil % : x    06-21    132<L>  |  101  |  27<H>  ----------------------------<  139<H>  4.3   |  18<L>  |  1.33<H>    Ca    8.8      21 Jun 2023 05:21  Phos  3.1     06-21  Mg     1.90     06-21    TPro  6.6  /  Alb  3.6  /  TBili  0.2  /  DBili  x   /  AST  21  /  ALT  14  /  AlkPhos  47  06-21    Urinalysis Basic - ( 21 Jun 2023 05:21 )    Color: x / Appearance: x / SG: x / pH: x  Gluc: 139 mg/dL / Ketone: x  / Bili: x / Urobili: x   Blood: x / Protein: x / Nitrite: x   Leuk Esterase: x / RBC: x / WBC x   Sq Epi: x / Non Sq Epi: x / Bacteria: x        VITALS  T(C): 37 (06-21-23 @ 05:44), Max: 37 (06-21-23 @ 05:44)  HR: 77 (06-21-23 @ 05:44) (77 - 85)  BP: 113/82 (06-21-23 @ 05:44) (113/82 - 157/75)  RR: 18 (06-21-23 @ 05:44) (18 - 18)  SpO2: 97% (06-21-23 @ 05:44) (97% - 99%)  Wt(kg): --    ALLERGIES  sulfa drugs (Unknown)      MEDICATIONS   apixaban 2.5 milliGRAM(s) Oral every 12 hours  atorvastatin 80 milliGRAM(s) Oral at bedtime  azithromycin  IVPB 500 milliGRAM(s) IV Intermittent every 24 hours  cefTRIAXone   IVPB 1000 milliGRAM(s) IV Intermittent every 24 hours      ----------------------------------------------------------------------------------------  PHYSICAL EXAM  Constitutional - NAD, Comfortable  HEENT - NCAT, EOMI  Chest -no respiratory distress  Cardiovascular - RRR, S1S2   Abdomen - Soft, NTND  Extremities - No C/C/E, No calf tenderness   Neurologic Exam -                    Cognitive - Awake, Alert, AAO to self, place, but not year     Communication - Fluent, mild dysarthria     Cranial Nerves - mild R facial weakness     Motor -                      LEFT    UE - ShAB 5/5, EF 5/5, EE 5/5, WE 5/5,  5/5                    RIGHT UE - ShAB 4/5, EF 4/5, EE 4/5, WE 4/5,  4/5                    LEFT    LE - HF 5/5, KE 5/5, DF 5/5, PF 5/5                    RIGHT LE - HF 5-/5, KE 5-/5, DF 5/5, PF 5/5        Sensory - Intact to LT     Reflexes - DTR Intact      OculoVestibular - No saccades, No nystagmus, VOR         Balance - WNL Static  Psychiatric - Mood stable, Affect WNL  ----------------------------------------------------------------------------------------  ASSESSMENT/PLAN  89 yo f p/w dysarthria and R sided weakness, r/o cva  found to have pna  on azithromycin for pna, ceftriaxone  mri head pending  bedside PT and OT pending  SLP  Pain -denies  DVT PPX - on eliquis  Rehab -  pending progress with medical course and bedside therapy.  If requires more assistance than her baseline, would consider inpatient rehab.  will monitor for progress

## 2023-06-21 NOTE — ED ADULT NURSE REASSESSMENT NOTE - AS PAIN REST
0 (no pain/absence of nonverbal indicators of pain)
0 (no pain/absence of nonverbal indicators of pain)
2 (mild pain)

## 2023-06-21 NOTE — ED ADULT NURSE REASSESSMENT NOTE - NS ED NURSE REASSESS COMMENT FT1
PT RECEIVED a&OX3 offering no complaints at this time. remains on continuous monitor, Afib noted. respirations even and unlabored. NIH score 2 at this time. minor R sided facial droop and R upper extremity weakness. 20G to RAC patent. VS as noted in flowsheet. awaiting transport to bed assignment. safety maintained, side rails up. aid at bedside.
PT is resting in stretcher, easily arousable to verbal stimuli. no apparent distress noted at this time. hand off will be given to primary nurse when return to area.
pt in spot 10a. a&Ox4, amb at baseline. Vitally stable .Denies chest pain, SOB, n/v, headache, dizziness, numbness/tingling to hands/feet. Endorsing mild cough. Pt provided IV abx for pneumonia at this time. urine sent. safety maitnained
Er report taking from night shift pt AOX3 cooperative, following instructions, denies any discomfort lungs clear, resp. equal 18-20b/min, IV to right arm, skin easy bruising, right side slightly weak, with right facial drop noted, as per previous shift MD was notified, as per son this AM pt was active till few days ago, pt NPO for further test.     Janie Dowd RN
Pt in spot 10a. A&Ox4, vitally stable. Denies chest pain, SOB, n/v, headache, dizziness, numbness/tingling to hands/feet. When doing a neuro check pt in the past neuro checks displayed no weakness to upper or lower extremities. At this time, pt displays slight weakness and drift in both R upper and lower extremity. Covering doctor called and made aware. Evaluated patient and agrees patient is weaker now than earlier evaluation. Pt to be ordered for other CT scans for further evaluation. Safety maintained.
pt in spot 10a. a&ox4, amb at baseline with steady gait with standby assist. Pt vitally stable. denies chest pain, sob, n/v, headache, dizziness, numbness/tingling to hands/feet. No other ocmplaints noted from pt endorsing mild headache 2/10 but comfortable. safety maintained

## 2023-06-21 NOTE — OCCUPATIONAL THERAPY INITIAL EVALUATION ADULT - PERTINENT HX OF CURRENT PROBLEM, REHAB EVAL
89 year old female with history AFib, HTN, anemia, presenting with difficulty with walking and slurred speech x 1 day. CT brain demonstrating right ICA stenosis, no acute infarct.

## 2023-06-21 NOTE — PHYSICAL THERAPY INITIAL EVALUATION ADULT - PERTINENT HX OF CURRENT PROBLEM, REHAB EVAL
patient is a 89 year old female who presents with dysarthria and R sided weakness, r/o cva  found to have pneumonia
bed rails

## 2023-06-21 NOTE — CHART NOTE - NSCHARTNOTEFT_GEN_A_CORE
Pt noted to have new onset R sided upper and lower extremity weakness compared to 4 hours ago. Also with worsening R sided droop at corner of mouth. Neuro called. Agree with repeat imaging with CTH non con and CT angio. Last known normal approximately 4.5 hours ago. Pt noted to have new onset R sided upper and lower extremity weakness compared to 4 hours ago. Also with worsening R sided droop at corner of mouth. Neuro called. Considering repeat imaging with CTH non con and CT angio. Last known normal approximately 4.5-5 hours ago so would be outside window for TPA.    Addendum: discussed with stroke fellow, this worsening R weakness represent progression of motor symptoms progressed so does not represent new baseline. will hold off on repeat imaging for now.     Pt made NPO. TTE with bubble study ordered

## 2023-06-22 LAB
ANION GAP SERPL CALC-SCNC: 14 MMOL/L — SIGNIFICANT CHANGE UP (ref 7–14)
BUN SERPL-MCNC: 19 MG/DL — SIGNIFICANT CHANGE UP (ref 7–23)
CALCIUM SERPL-MCNC: 9 MG/DL — SIGNIFICANT CHANGE UP (ref 8.4–10.5)
CHLORIDE SERPL-SCNC: 101 MMOL/L — SIGNIFICANT CHANGE UP (ref 98–107)
CO2 SERPL-SCNC: 22 MMOL/L — SIGNIFICANT CHANGE UP (ref 22–31)
CREAT SERPL-MCNC: 1.35 MG/DL — HIGH (ref 0.5–1.3)
EGFR: 37 ML/MIN/1.73M2 — LOW
GLUCOSE SERPL-MCNC: 109 MG/DL — HIGH (ref 70–99)
HCT VFR BLD CALC: 32.8 % — LOW (ref 34.5–45)
HGB BLD-MCNC: 10.6 G/DL — LOW (ref 11.5–15.5)
MAGNESIUM SERPL-MCNC: 2.1 MG/DL — SIGNIFICANT CHANGE UP (ref 1.6–2.6)
MCHC RBC-ENTMCNC: 30.1 PG — SIGNIFICANT CHANGE UP (ref 27–34)
MCHC RBC-ENTMCNC: 32.3 GM/DL — SIGNIFICANT CHANGE UP (ref 32–36)
MCV RBC AUTO: 93.2 FL — SIGNIFICANT CHANGE UP (ref 80–100)
NRBC # BLD: 0 /100 WBCS — SIGNIFICANT CHANGE UP (ref 0–0)
NRBC # FLD: 0 K/UL — SIGNIFICANT CHANGE UP (ref 0–0)
PHOSPHATE SERPL-MCNC: 3 MG/DL — SIGNIFICANT CHANGE UP (ref 2.5–4.5)
PLATELET # BLD AUTO: 198 K/UL — SIGNIFICANT CHANGE UP (ref 150–400)
POTASSIUM SERPL-MCNC: 4.2 MMOL/L — SIGNIFICANT CHANGE UP (ref 3.5–5.3)
POTASSIUM SERPL-SCNC: 4.2 MMOL/L — SIGNIFICANT CHANGE UP (ref 3.5–5.3)
PROCALCITONIN SERPL-MCNC: 0.11 NG/ML — HIGH (ref 0.02–0.1)
RBC # BLD: 3.52 M/UL — LOW (ref 3.8–5.2)
RBC # FLD: 14.4 % — SIGNIFICANT CHANGE UP (ref 10.3–14.5)
SODIUM SERPL-SCNC: 137 MMOL/L — SIGNIFICANT CHANGE UP (ref 135–145)
WBC # BLD: 11.19 K/UL — HIGH (ref 3.8–10.5)
WBC # FLD AUTO: 11.19 K/UL — HIGH (ref 3.8–10.5)

## 2023-06-22 PROCEDURE — 74230 X-RAY XM SWLNG FUNCJ C+: CPT | Mod: 26

## 2023-06-22 PROCEDURE — 93306 TTE W/DOPPLER COMPLETE: CPT | Mod: 26

## 2023-06-22 PROCEDURE — 70450 CT HEAD/BRAIN W/O DYE: CPT | Mod: 26

## 2023-06-22 PROCEDURE — 99233 SBSQ HOSP IP/OBS HIGH 50: CPT

## 2023-06-22 PROCEDURE — 99232 SBSQ HOSP IP/OBS MODERATE 35: CPT

## 2023-06-22 RX ORDER — METOPROLOL TARTRATE 50 MG
25 TABLET ORAL DAILY
Refills: 0 | Status: DISCONTINUED | OUTPATIENT
Start: 2023-06-23 | End: 2023-06-23

## 2023-06-22 RX ADMIN — CEFTRIAXONE 100 MILLIGRAM(S): 500 INJECTION, POWDER, FOR SOLUTION INTRAMUSCULAR; INTRAVENOUS at 11:06

## 2023-06-22 RX ADMIN — APIXABAN 2.5 MILLIGRAM(S): 2.5 TABLET, FILM COATED ORAL at 17:25

## 2023-06-22 RX ADMIN — AZITHROMYCIN 255 MILLIGRAM(S): 500 TABLET, FILM COATED ORAL at 14:00

## 2023-06-22 RX ADMIN — APIXABAN 2.5 MILLIGRAM(S): 2.5 TABLET, FILM COATED ORAL at 06:17

## 2023-06-22 RX ADMIN — ATORVASTATIN CALCIUM 80 MILLIGRAM(S): 80 TABLET, FILM COATED ORAL at 21:53

## 2023-06-22 NOTE — SWALLOW VFSS/MBS ASSESSMENT ADULT - COMMENTS
Per Hospitalist Note 6/21, "89 yo F with PMH of AFib on Eliquis, HTN, IBS, and anemia, presenting with difficulty with walking and slurred speech x 1 day, initial code stroke workup unremarkable, admitted for toxic metabolic encephalopathy and further stroke workup."     CT Head 6/22: IMPRESSION: No acute hemorrhage mass or mass effect.    CXR 6/20: IMPRESSION: New hazy opacity in the right lung base consistent with small effusion/atelectasis.    Of Note: Patient known to this service, seen for a clinical swallow evaluation on 6/21/2023 (see report for details).     Patient received in the Radiology Suite this AM for a cinesophagram. Patient transferred to specialized seating with lateral projection view. Patient able to make wants/ needs known and follow basic directives.

## 2023-06-22 NOTE — PROGRESS NOTE ADULT - PROBLEM SELECTOR PLAN 7
Diet: NPO pending cinesophagram, LR @ 50cc/hr for gentle hydration  DVT ppx: Eliquis  Dispo: recommended for rehab, however family prefers home PT  Communication: discussed case at length with patient's son, Verena ([phone]), on 6/21 at 2:30PM
Diet: NPO pending cinesophagram  DVT ppx: Eliquis  Dispo: recommended for rehab, however family prefers home PT  Communication: discussed case with patient's son, Verena ([phone]), at bedside on 6/22

## 2023-06-22 NOTE — SWALLOW VFSS/MBS ASSESSMENT ADULT - RECOMMENDED CONSISTENCY
Diagnostic Impression Continued: There was Penetration during the swallow without retrieval for moderately thick liquids via cup sip, mildly thick liquids via cup sip presentation and for thin liquids via cup sip/ teaspoon presentation. There was Aspiration (Silent) for Thin Liquids during the swallow.     RECOMMENDATIONS:  1. Puree with Moderately Thick Liquids via Teaspoon Only 2. Aspiration Precautions 3. Feeding and Swallowing Guidelines: Small single teaspoon trials (5 ml) of puree and moderately thick liquids, slow rate of intake, upright with PO and 30 minutes post, crush medication (when feasible) 4. Reflux Precautions 5. Oral Hygiene Diagnostic Impression Continued: There was Laryngeal Penetration during the swallow without retrieval for moderately thick liquids via cup sip and mildly thick liquids via cup sip presentation leaving trace residue in the laryngeal vestibule above the level of the vocal folds. There was Aspiration (Silent) for Thin Liquids during the swallow with cup sip presentation.     Of Note: Incidental finding of an osteophyte at C4 resulting in an indentation; however, not obstructing for bolus passage as per discussion with Radiologist.     RECOMMENDATIONS:  1. Puree with Moderately Thick Liquids via TEASPOON Only 2. Aspiration Precautions 3. Feeding and Swallowing Guidelines: Small single teaspoon trials (5 ml) of puree and moderately thick liquids, slow rate of intake, upright with PO and 30 minutes post, crush medication (when feasible) 4. Reflux Precautions 5. Oral Hygiene

## 2023-06-22 NOTE — SWALLOW VFSS/MBS ASSESSMENT ADULT - DIAGNOSTIC IMPRESSIONS
1. Mild oral dysphagia for mildly thick liquids and soft and bite-sized solids marked by adequate containment, adequate mastication of soft and bite-sized, adequate anterior to posterior transport with mild oral residue located on the lingual surface for mildly thick liquids and on the mid-posterior lingual surface for soft and bite-sized. Patient did not spontaneously reswallow to clear oral residue and did not reswallow given verbal cues; however, a liquid wash of thin liquids benefits to clear oral residue for soft and bite-sized solids. 2. Functional oral stage for puree, moderately thick liquids and thin liquids marked by adequate containment, adequate anterior to posterior transport and adequate oral clearance post swallow. 3. Functional pharyngeal stage for puree, soft and bite-sized, moderately thick liquids via Teaspoon and Mildly Thick Liquids via Teaspoon marked by adequate initiation of the pharyngeal swallow, adequate hyolaryngeal elevation/ adequate laryngeal vestibular closure, adequate base of tongue retraction, adequate epiglottic deflection, adequate pharyngeal contractility. There is adequate pharyngeal clearance. There is no Aspiration before, during or after the swallow for puree, moderately thick liquids via teaspoon and mildly thick liquids via teaspoon. 4. Moderate-Severe pharyngeal dysphagia for moderately thick liquids via cup sip, mildly thick liquids via cup sip and thin liquids via teaspoon and cup sip marked by delayed initiation of the pharyngeal swallow (bolus head to the lateral surface of the epiglottis) for mildly thick liquids and thin liquids, adequate base of tongue retraction, reduced hyolaryngeal excursion/ reduced laryngeal vestibular closure, reduced epiglottic deflection, adequate pharyngeal contractility. There was adequate pharyngeal clearance. Continued Below. 1. Mild oral dysphagia for mildly thick liquids and soft and bite-sized solids marked by adequate containment, adequate mastication of soft and bite-sized, adequate anterior to posterior transport with mild oral residue located on the mid-posterior lingual surface for mildly thick liquids and soft and bite-sized. Patient did not spontaneously reswallow to clear oral residue (soft and bite-sized solids> mildly thick liquids) and did not reswallow given verbal cues; however, a liquid wash of thin liquids benefits to clear oral residue for soft and bite-sized solids. 2. Functional oral stage for puree, moderately thick liquids and thin liquids marked by adequate containment, adequate anterior to posterior transport and adequate oral clearance post swallow. 3. Functional pharyngeal stage for puree, soft and bite-sized, moderately thick liquids via Teaspoon and Mildly Thick Liquids via Teaspoon marked by adequate initiation of the pharyngeal swallow, adequate hyolaryngeal elevation/ adequate laryngeal vestibular closure, adequate base of tongue retraction, adequate epiglottic deflection, adequate pharyngeal contractility. There is adequate pharyngeal clearance. There is No Aspiration before, during or after the swallow for puree, moderately thick liquids via teaspoon and mildly thick liquids via teaspoon. 4. Moderate-Severe pharyngeal dysphagia for moderately thick liquids via cup sip, mildly thick liquids via cup sip and thin liquids via teaspoon and cup sip marked by delayed initiation of the pharyngeal swallow (bolus head to the lateral surface of the epiglottis) for mildly thick liquids and thin liquids, adequate base of tongue retraction, reduced hyolaryngeal excursion/ reduced laryngeal vestibular closure, reduced epiglottic deflection, adequate pharyngeal contractility. There was adequate pharyngeal clearance. Continued Below.

## 2023-06-22 NOTE — PROGRESS NOTE ADULT - PROBLEM SELECTOR PLAN 4
Baseline SCr 1.26 in July 2022, on admission 1.55. Concern for pre-renal LIAM vs. CKD.  - F/u urine studies  - Monitor SCr  - Avoid NSAIDs and nephrotoxic agents  - Renally dose meds
Baseline SCr 1.26 in July 2022, on admission 1.55. Concern for pre-renal LIAM vs. CKD.  - F/u urine studies  - Monitor SCr  - Avoid NSAIDs and nephrotoxic agents  - Renally dose meds  - Cr 1.35 on 6/22, close to baseline

## 2023-06-22 NOTE — SWALLOW VFSS/MBS ASSESSMENT ADULT - ADDITIONAL RECOMMENDATIONS
This service to follow up to ensure tolerance of recommended PO/ swallowing therapy as schedule permits. Medical team further advised to reconsult this service if patient is with a change in medical status or change in tolerance of recommended PO. Patient may benefit from swallowing therapy pending discharge plans (i.e., Rehab Clinic vs. Homecare vs. [facility] [phone]).

## 2023-06-22 NOTE — SWALLOW VFSS/MBS ASSESSMENT ADULT - ROSENBEK'S PENETRATION ASPIRATION SCALE
(1) no aspiration, contrast does not enter airway 3 for cup sip; 1 for teaspoon presentation (5ml)/(3) contrast remains above the vocal cords, visible residue remains (penetration) 5; 8 (3) contrast remains above the vocal cords, visible residue remains (penetration)

## 2023-06-22 NOTE — PATIENT PROFILE ADULT - FALL HARM RISK - HARM RISK INTERVENTIONS

## 2023-06-22 NOTE — PROGRESS NOTE ADULT - PROBLEM SELECTOR PLAN 5
[id number] score 6 (if counting stroke hx from this admission).  - C/w home Eliquis  - Pt's home metoprolol succinate on hold for permissive hypertension
[id number] score 6 (if counting stroke hx from this admission).  - C/w home Eliquis  - Pt's home metoprolol succinate on hold for permissive hypertension  - plan to restart home toprol on 6/23

## 2023-06-22 NOTE — SWALLOW VFSS/MBS ASSESSMENT ADULT - ORAL PHASE
residue on lingual surface within functional limits Within functional limits mild residue located at the mid-posterior tongue patient did no spontaneously reswallow, patient did not reswallow despite being given verbal cues; liquid wash of thin liquid clears residue

## 2023-06-22 NOTE — PROGRESS NOTE ADULT - SUBJECTIVE AND OBJECTIVE BOX
Patient is a 89y old  Female who presents with a chief complaint of encephalopathy (22 Jun 2023 15:55)      HPI:  89 yo F with PMH of AFib on Eliquis, HTN, IBS, and anemia, presenting with difficulty with walking and slurred speech x 1 day. Her last known normal was 6/19/23 at 9PM. Per 24/7 HHA, she woke up the following morning at 9AM with slurred speech and difficulty walking. At baseline, she is highly functional and can ambulate without any assistance or devices. She has never had this happen to her before. She does not have any personal or family history of stroke or TIA. Over the last several days, she has been reporting dysuria but denies any fevers, chills, or cough. She reports she still feels like she is not speaking as clearly as she normally does.     On arrival to ED, VSS. Code stroke called. Initial imaging with CTH, CTA head, CT perfusion unremarkable for acute pathology. UA with 121 WBC and moderate bacteria. CXR with hazy opacity in right lung base. Given azithromycin and ceftriaxone. Admitted to medicine for further workup and management.  (20 Jun 2023 21:12)      REVIEW OF SYSTEMS   weakness    PAST MEDICAL & SURGICAL HISTORY  History of IBS    Osteoporosis    Essential hypertension    No significant past surgical history    CURRENT FUNCTIONAL STATUS  6/21   Bed Mobility: Sit to Supine:     · Level of [place]	moderate assist (50% patients effort)  · Physical Assist/Nonphysical Assist	1 person assist; nonverbal cues (demo/gestures); verbal cues  · Assistive Device	bed rails    Bed Mobility: Supine to Sit:     · Level of [place]	moderate assist (50% patients effort)  · Physical Assist/Nonphysical Assist	1 person assist; nonverbal cues (demo/gestures); verbal cues  · Assistive Device	bed rails    Bed Mobility Analysis:     · Bed Mobility Limitations	impaired ability to control trunk for mobility; decreased ability to use legs for bridging/pushing  · Impairments Contributing to Impaired Bed Mobility	impaired balance; impaired postural control; decreased strength    Transfer: Sit to Stand:     · Level of [place]	unable to perform; patient deferred due to fatigeu    Gait Skills:     · Level of [place]	unable to perform    Balance Skills Assessment:     · Sitting Balance: Static	fair balance  +right sided trunk lean  · Sitting Balance: Dynamic	poor balance  · Systems Impairment Contributing to Balance Disturbance	musculoskeletal; neuromuscular  · Identified Impairments Contributing to Balance Disturbance	impaired motor control; impaired postural control; decreased ROM; decreased strength    Sensory Examination:    Sensory Examination:        FAMILY HISTORY   No pertinent family history in first degree relatives        RECENT LABS/IMAGING  < from: CT Head No Cont (06.22.23 @ 09:27) >    ACC: [id number] EXAM:  CT BRAIN   ORDERED BY: RYAN GRADY     PROCEDURE DATE:  06/22/2023          INTERPRETATION:  Clinical indication: History of code stroke.    Multiple axial sections were performed from the base of vertex without   contrast enhancement. Coronal and sagittal reconstructions performed    This exams compare prior head CT performed on June 20, 2023.    Parenchymal volume loss and chronic microvascular ischemic changes are   again seen and unchanged    There is no acute hemorrhage mass or mass effect seen    Evaluation of the osseous structures with the appropriate window appears   normal    The visualized paranasal sinuses mastoid and middle ear regions appear   clear.    IMPRESSION: No acute hemorrhage mass or mass effect.    --- End of Report ---            VETO OLIVIER MD; Attending Radiologist  This document has been electronically signed. Jun 22 2023  1:30PM    < end of copied text >    CBC Full  -  ( 22 Jun 2023 05:28 )  WBC Count : 11.19 K/uL  RBC Count : 3.52 M/uL  Hemoglobin : 10.6 g/dL  Hematocrit : 32.8 %  Platelet Count - Automated : 198 K/uL  Mean Cell Volume : 93.2 fL  Mean Cell Hemoglobin : 30.1 pg  Mean Cell Hemoglobin Concentration : 32.3 gm/dL  Auto Neutrophil # : x  Auto Lymphocyte # : x  Auto Monocyte # : x  Auto Eosinophil # : x  Auto Basophil # : x  Auto Neutrophil % : x  Auto Lymphocyte % : x  Auto Monocyte % : x  Auto Eosinophil % : x  Auto Basophil % : x    06-22    137  |  101  |  19  ----------------------------<  109<H>  4.2   |  22  |  1.35<H>    Ca    9.0      22 Jun 2023 05:28  Phos  3.0     06-22  Mg     2.10     06-22    TPro  6.6  /  Alb  3.6  /  TBili  0.2  /  DBili  x   /  AST  21  /  ALT  14  /  AlkPhos  47  06-21    Urinalysis Basic - ( 22 Jun 2023 05:28 )    Color: x / Appearance: x / SG: x / pH: x  Gluc: 109 mg/dL / Ketone: x  / Bili: x / Urobili: x   Blood: x / Protein: x / Nitrite: x   Leuk Esterase: x / RBC: x / WBC x   Sq Epi: x / Non Sq Epi: x / Bacteria: x        VITALS  T(C): 36.7 (06-22-23 @ 13:30), Max: 37.1 (06-21-23 @ 21:17)  HR: 72 (06-22-23 @ 13:30) (68 - 81)  BP: 138/79 (06-22-23 @ 13:30) (138/79 - 154/89)  RR: 18 (06-22-23 @ 13:30) (18 - 18)  SpO2: 100% (06-22-23 @ 13:30) (97% - 100%)  Wt(kg): --    ALLERGIES  sulfa drugs (Unknown)      MEDICATIONS   apixaban 2.5 milliGRAM(s) Oral every 12 hours  atorvastatin 80 milliGRAM(s) Oral at bedtime  cefTRIAXone   IVPB 1000 milliGRAM(s) IV Intermittent every 24 hours      ----------------------------------------------------------------------------------------   PHYSICAL EXAM  Constitutional - NAD, Comfortable  HEENT - NCAT, EOMI  Chest -no respiratory distress  Cardiovascular - RRR, S1S2   Abdomen - Soft, NTND  Extremities - No C/C/E, No calf tenderness   Neurologic Exam -                    Cognitive - Awake, Alert     Communication - Fluent,  dysarthria improving     Cranial Nerves - mild R facial weakness     Motor -                      LEFT    UE - ShAB 5/5, EF 5/5, EE 5/5, WE 5/5,  5/5                    RIGHT UE - ShAB 4/5, EF 4/5, EE 4/5, WE 4/5,  4/5                    LEFT    LE - HF 5/5, KE 5/5, DF 5/5, PF 5/5                    RIGHT LE - HF 5-/5, KE 5-/5, DF 5/5, PF 5/5        Sensory - Intact to LT       Balance - WNL Static  Psychiatric - Mood stable, Affect WNL  ----------------------------------------------------------------------------------------  ASSESSMENT/PLAN  89 yo f p/w dysarthria and R sided weakness   found to have pna and UTI  repeat CT head negative for cva  ceftriaxone   bedside PT and OT    SLP  Pain -denies  DVT PPX - on eliquis  Rehab -  recommend follow up PT session.  Patient required increased assistance from baseline, would benefit from acute inpatient rehab when medically cleared. Patient can tolerate 3 hours per day of therapy with medical supervision.     
[facility] Department of Hospital Medicine  Omi Cevallos MD  Available on MS Teams  Pager: [phone]    Patient is a 89y old  Female who presents with a chief complaint of encephalopathy (21 Jun 2023 17:39)    OVERNIGHT EVENTS: No acute events overnight.    SUBJECTIVE: Pt seen and examined at bedside this morning. Patient's son and HHA also at bedside. Patient reporting that she feels much better. Continues to endorse some right sided weakness. Per Aide, patient appears to be much more alert and close to baseline. Patient otherwise denies any acute complaints today.     ADDITIONAL REVIEW OF SYSTEMS: as above.    MEDICATIONS  (STANDING):  apixaban 2.5 milliGRAM(s) Oral every 12 hours  atorvastatin 80 milliGRAM(s) Oral at bedtime  cefTRIAXone   IVPB 1000 milliGRAM(s) IV Intermittent every 24 hours  lactated ringers. 1000 milliLiter(s) (50 mL/Hr) IV Continuous <Continuous>    MEDICATIONS  (PRN):    CAPILLARY BLOOD GLUCOSE    I&O's Summary    PHYSICAL EXAM:  Vital Signs Last 24 Hrs  T(C): 36.7 (22 Jun 2023 09:47), Max: 37.1 (21 Jun 2023 21:17)  T(F): 98 (22 Jun 2023 09:47), Max: 98.8 (21 Jun 2023 21:17)  HR: 68 (22 Jun 2023 09:47) (68 - 81)  BP: 153/79 (22 Jun 2023 09:47) (138/80 - 154/89)  BP(mean): --  RR: 18 (22 Jun 2023 09:47) (18 - 18)  SpO2: 99% (22 Jun 2023 09:47) (97% - 100%)    Parameters below as of 22 Jun 2023 09:47  Patient On (Oxygen Delivery Method): room air    CONSTITUTIONAL: NAD, well-developed  HEAD: Normocephalic, atraumatic  EYES: EOMI, PERRL  ENT: no rhinorrhea, no pharyngeal erythema  RESPIRATORY: No increased work of breathing, CTAB, no wheezes or crackles appreciated  CARDIOVASCULAR: RRR, S1 and S2 present, no m/r/g  ABDOMEN: soft, NT, ND, bowel sounds present  EXTREMITIES: No LE edema  MUSCULOSKELETAL: no joint swelling, no tenderness to palpation  NEURO: A&Ox3, moving all extremities, RUE and RLE with 4/5 strength, R facial droop noted (although appears somewhat improved)    LABS:                        10.6   11.19 )-----------( 198      ( 22 Jun 2023 05:28 )             32.8     06-22    137  |  101  |  19  ----------------------------<  109<H>  4.2   |  22  |  1.35<H>    Ca    9.0      22 Jun 2023 05:28  Phos  3.0     06-22  Mg     2.10     06-22    TPro  6.6  /  Alb  3.6  /  TBili  0.2  /  DBili  x   /  AST  21  /  ALT  14  /  AlkPhos  47  06-21    PT/INR - ( 20 Jun 2023 18:12 )   PT: 11.6 sec;   INR: 1.00 ratio       PTT - ( 20 Jun 2023 18:12 )  PTT:28.1 sec    Urinalysis Basic - ( 22 Jun 2023 05:28 )    Color: x / Appearance: x / SG: x / pH: x  Gluc: 109 mg/dL / Ketone: x  / Bili: x / Urobili: x   Blood: x / Protein: x / Nitrite: x   Leuk Esterase: x / RBC: x / WBC x   Sq Epi: x / Non Sq Epi: x / Bacteria: x    Culture - Urine (collected 20 Jun 2023 21:26)  Source: Clean Catch Clean Catch (Midstream)  Preliminary Report (22 Jun 2023 02:38):    >100,000 CFU/ml Escherichia coli    RADIOLOGY & ADDITIONAL TESTS:    Results Reviewed:   Imaging Personally Reviewed:  Electrocardiogram Personally Reviewed:    COORDINATION OF CARE:  Care Discussed with Consultants/Other Providers [Y/N]:  Prior or Outpatient Records Reviewed [Y/N]:  
[facility] Department of Hospital Medicine  Omi Cevallos MD  Available on MS Teams  Pager: [phone]    Patient is a 89y old  Female who presents with a chief complaint of encephalopathy (2023 09:21)    OVERNIGHT EVENTS: No acute events overnight.    SUBJECTIVE: Pt seen and examined at bedside this morning. Patient's HHA at bedside. Patient pleasant and cooperative. Reports that she feels well. Per HHA, patient seems slightly weaker and somewhat confused compared to usual. Patient endorsing some right sided weakness. Otherwise denies any headache, CP, SOB, dizziness, nausea, vomiting or abd pain.    ADDITIONAL REVIEW OF SYSTEMS: as above    MEDICATIONS  (STANDING):  apixaban 2.5 milliGRAM(s) Oral every 12 hours  atorvastatin 80 milliGRAM(s) Oral at bedtime  azithromycin  IVPB 500 milliGRAM(s) IV Intermittent every 24 hours  cefTRIAXone   IVPB 1000 milliGRAM(s) IV Intermittent every 24 hours  lactated ringers. 1000 milliLiter(s) (50 mL/Hr) IV Continuous <Continuous>    MEDICATIONS  (PRN):    CAPILLARY BLOOD GLUCOSE  139 (2023 18:09)    POCT Blood Glucose.: 139 mg/dL (2023 17:43)    I&O's Summary    PHYSICAL EXAM:  Vital Signs Last 24 Hrs  T(C): 36.7 (2023 14:25), Max: 37 (2023 05:44)  T(F): 98.1 (2023 14:25), Max: 98.6 (2023 05:44)  HR: 83 (2023 14:25) (77 - 85)  BP: 134/52 (2023 14:25) (113/82 - 157/75)  BP(mean): --  RR: 17 (2023 14:25) (17 - 18)  SpO2: 97% (2023 14:25) (97% - 99%)    Parameters below as of 2023 14:25  Patient On (Oxygen Delivery Method): room air    CONSTITUTIONAL: NAD, well-developed  HEAD: Normocephalic, atraumatic  EYES: EOMI, PERRL  ENT: no rhinorrhea, no pharyngeal erythema  RESPIRATORY: No increased work of breathing, CTAB, no wheezes or crackles appreciated  CARDIOVASCULAR: RRR, S1 and S2 present, no m/r/g  ABDOMEN: soft, NT, ND, bowel sounds present  EXTREMITIES: No LE edema  MUSCULOSKELETAL: no joint swelling, no tenderness to palpation  NEURO: A&Ox3 (oriented to self, , year and that she is at [facility]), moving all extremities, RUE and RLE with 4/5 strength, R facial droop noted    LABS:                        10.5   14.24 )-----------( 188      ( 2023 05:21 )             31.5     06-21    132<L>  |  101  |  27<H>  ----------------------------<  139<H>  4.3   |  18<L>  |  1.33<H>    Ca    8.8      2023 05:21  Phos  3.1     06-21  Mg     1.90     -21    TPro  6.6  /  Alb  3.6  /  TBili  0.2  /  DBili  x   /  AST  21  /  ALT  14  /  AlkPhos  47  -21    PT/INR - ( 2023 18:12 )   PT: 11.6 sec;   INR: 1.00 ratio      PTT - ( 2023 18:12 )  PTT:28.1 sec    Urinalysis Basic - ( 2023 05:21 )    Color: x / Appearance: x / SG: x / pH: x  Gluc: 139 mg/dL / Ketone: x  / Bili: x / Urobili: x   Blood: x / Protein: x / Nitrite: x   Leuk Esterase: x / RBC: x / WBC x   Sq Epi: x / Non Sq Epi: x / Bacteria: x    RADIOLOGY & ADDITIONAL TESTS:    Results Reviewed:   Imaging Personally Reviewed:  Electrocardiogram Personally Reviewed:    COORDINATION OF CARE:  Care Discussed with Consultants/Other Providers [Y/N]:  Prior or Outpatient Records Reviewed [Y/N]:

## 2023-06-22 NOTE — SWALLOW VFSS/MBS ASSESSMENT ADULT - THE ABOVE FINDINGS WERE DISCUSSED WITH
Call out to [facility] [id number] Spoke with Nurse; Call out to ACP

## 2023-06-22 NOTE — PROGRESS NOTE ADULT - PROBLEM SELECTOR PLAN 1
Arrived initially as code stroke for slurred speech and difficulty with ambulation. Code stroke initial workup with CTH noncontrast, CTA H/N with IV contrast, CT perfusion unremarkable.   Pt with progressive R sided weakness during ED stay. LKN > 4.5 hrs ago, not a TPA candidate.  - Neurology following, appreciate reccs  - d/w neuro, MRI would not , will obtain interval CTH instead  - TTE with bubble study ordered  - Monitor on tele  - Q4hr neuro checks and vital signs  - Permissive hypertension up to 220/110 mmHg for 48hrs from symptom onset  - Dysphagia screen passed, started on pureed diet. However, made NPO again after worsening symptoms overnight.   - speech and swallow recommending NPO, cinesophagram pending  - Start atorvastatin 80 mg qHS   - PT/OT/PM&R consulted, recommended for rehab  - Aspiration precautions and fall risk protocol
Arrived initially as code stroke for slurred speech and difficulty with ambulation. Code stroke initial workup with CTH noncontrast, CTA H/N with IV contrast, CT perfusion unremarkable.   Pt with progressive R sided weakness during ED stay. LKN > 4.5 hrs ago, not a TPA candidate.  - Neurology following, appreciate reccs  - d/w neuro, MRI would not , interval CTH obtained on 6/22 showing no change  - TTE with bubble study performed on 6/22, no PFO, EF 69% with grossly normal LV/RV fxn, significant AS  - Monitor on tele  - Q4hr neuro checks and vital signs  - Dysphagia screen passed, started on pureed diet. However, made NPO again after worsening symptoms overnight.   - speech and swallow recommending NPO, cinesophagram pending  - Start atorvastatin 80 mg qHS   - PT/OT/PM&R consulted, recommended for rehab  - Aspiration precautions and fall risk protocol

## 2023-06-22 NOTE — PROGRESS NOTE ADULT - PROBLEM/PLAN-1
- Subjective


Encounter Start Date: 12/29/17


Encounter Start Time: 14:13


Subjective: Pt seen and examined for Left Leg DVT


-: denies any CP, swelling of leg improving





- Objective


MAR Reviewed: Yes


Vital Signs & Weight: 


 Vital Signs (12 hours)











  Temp Pulse Resp BP BP Pulse Ox


 


 12/29/17 10:54  98.3 F  73  20   122/58 L  95


 


 12/29/17 07:51  98.8 F  77  16   121/60  96


 


 12/29/17 07:50  98.8 F  73  16   


 


 12/29/17 04:00  98.8 F  73  16  115/63   94 L








 Weight











Weight                         192 lb 9.6 oz














I&O: 


 











 12/28/17 12/29/17 12/30/17





 06:59 06:59 06:59


 


Intake Total 1292 2449 1050


 


Output Total 500 1900 


 


Balance 











Result Diagrams: 


 12/29/17 13:10





 12/29/17 13:10


Radiology Reviewed by me: Yes





Phys Exam





- Physical Examination


HEENT: PERRLA, moist MMs, sclera anicteric, TM's clear, oral pharynx no lesions

, 2+ tonsils


Neck: no nodes, no JVD, supple, full ROM


Respiratory: no wheezing, no rales, no rhonchi, wheezing present, clear to 

auscultation bilateral


Cardiovascular: RRR, no significant murmur, no rub, gallop, irregular


Gastrointestinal: soft, non-tender, no distention, positive bowel sounds


Left Leg shows mild erythema


Neurological: non-focal, normal sensation, moves all 4 limbs





Dx/Plan


(1) Dvt femoral (deep venous thrombosis)


Code(s): I82.419 - ACUTE EMBOLISM AND THROMBOSIS OF UNSPECIFIED FEMORAL VEIN   

Status: Acute   





(2) HTN (hypertension), benign


Code(s): I10 - ESSENTIAL (PRIMARY) HYPERTENSION   Status: Chronic   





(3) Arrhythmia


Code(s): I49.9 - CARDIAC ARRHYTHMIA, UNSPECIFIED   Status: Chronic   





- Plan


DVT proph w/lovenox


Continue Lovenox 1mg/kg with Coumadin #2


-: Follow Pt, INR


-: H/o Arrthymia continue Multaq and metprolol 


-: Hypercoaguable work up is pending





* .








Review of Systems





- Review of Systems


Eyes: negative: Pain, Vision Change, Conjunctivae Inflammation, Eyelid 

Inflammation, Redness, Other


Cardiovascular: negative: chest pain, palpitations, orthopnea, paroxysmal 

nocturnal dyspnea, edema, light headedness, other


Gastrointestinal: negative: Nausea, Vomiting, Abdominal Pain, Diarrhea, 

Constipation, Melena, Hematochezia, Other


Genitourinary: negative: Dysuria, Frequency, Incontinence, Hematuria, Retention

, Other





- Medications/Allergies


Allergies/Adverse Reactions: 


 Allergies











Allergy/AdvReac Type Severity Reaction Status Date / Time


 


albuterol Allergy   Verified 12/27/17 19:12


 


meperidine [From Demerol] Allergy   Verified 12/27/17 19:12











Medications: 


 Current Medications





Hydrocodone Bitart/Acetaminophen (Norco 5/325)  1 tab PO Q4H PRN


   PRN Reason: PAIN SCALE 1-5


Hydrocodone Bitart/Acetaminophen (Norco 5/325)  2 tab PO Q4H PRN


   PRN Reason: PAIN SCALE 6-10


   Last Admin: 12/29/17 10:26 Dose:  2 tab


Dronedarone (Multaq)  400 mg PO BID [facility], [facility]


   Last Admin: 12/29/17 10:23 Dose:  400 mg


Enoxaparin Sodium (Lovenox)  90 mg SC 0400,1600 [facility]


   Last Admin: 12/29/17 04:00 Dose:  90 mg


Sodium Chloride (Normal Saline 0.9%)  1,000 mls @ 75 mls/hr IV .[id number] [facility]


   Last Admin: 12/29/17 12:33 Dose:  1,000 mls


Loratadine (Claritin)  10 mg PO HS [facility]


   Last Admin: 12/28/17 19:55 Dose:  10 mg


Metoprolol Succinate (Toprol Xl)  25 mg PO QPM [facility]


   Last Admin: 12/28/17 19:55 Dose:  25 mg


Morphine Sulfate (Morphine)  4 mg SLOW IVP Q4H PRN


   PRN Reason: Pain


   Last Admin: 12/28/17 21:42 Dose:  4 mg


Sertraline HCl (Zoloft)  50 mg PO DAILY [facility]


   Last Admin: 12/29/17 10:23 Dose:  50 mg


Sodium Chloride (Flush - Normal Saline)  10 ml IVF PRN PRN


   PRN Reason: Saline Flush


Trazodone HCl (Desyrel)  100 mg PO HS [facility]


   Last Admin: 12/28/17 19:55 Dose:  100 mg


Warfarin Sodium (Coumadin)  7.5 mg PO 1700 [facility]


   Last Admin: 12/28/17 17:09 Dose:  7.5 mg
DISPLAY PLAN FREE TEXT
DISPLAY PLAN FREE TEXT

## 2023-06-22 NOTE — PROGRESS NOTE ADULT - PROBLEM SELECTOR PLAN 6
On amlodipine 5 mg daily and metoprolol succinate 25 mg daily at home  - Hold pt's home amlodipine and metoprolol succinate for permissive hypertension over next 48hrs followed by gradual normotension
On amlodipine 5 mg daily and metoprolol succinate 25 mg daily at home  - Hold pt's home amlodipine and metoprolol succinate for permissive hypertension over next 48hrs followed by gradual normotension  - restart home toprol on 6/23

## 2023-06-22 NOTE — PROGRESS NOTE ADULT - PROBLEM SELECTOR PLAN 3
UA with positive nitrite, large LE, 121 WBC, and moderate bacteria. Pt also with dysuria.  - S/p IV ceftriaxone and azithromycin in ED. C/w IV ceftriaxone and azithromycin for now (6/20-  ) to cover for both UTI and possible PNA.
UA with positive nitrite, large LE, 121 WBC, and moderate bacteria. Pt also with dysuria.  - S/p IV ceftriaxone and azithromycin in ED. C/w IV ceftriaxone and azithromycin for now (6/20-  ) to cover for both UTI and possible PNA.  - UCx with >100K E coli, pending sensitivities  - plan for transition to PO Abx on 6/23 to complete total 5 day course

## 2023-06-22 NOTE — PROGRESS NOTE ADULT - ASSESSMENT
89 yo F with PMH of AFib on Eliquis, HTN, IBS, and anemia, presenting with difficulty with walking and slurred speech x 1 day, initial code stroke workup unremarkable, admitted for toxic metabolic encephalopathy and further stroke workup.
89 yo F with PMH of AFib on Eliquis, HTN, IBS, and anemia, presenting with difficulty with walking and slurred speech x 1 day, initial code stroke workup unremarkable, admitted for toxic metabolic encephalopathy and further stroke workup.

## 2023-06-22 NOTE — PROGRESS NOTE ADULT - PROBLEM SELECTOR PLAN 2
Symptoms may also be related to infectious etiology, as pt endorsing dysuria as well as +UA. Also with hazy opacity in R lower lung on CXR.   Less likely aspiration, as pt with no hx of dysphagia, no witnessed choking events.   - S/p IV ceftriaxone and azithromycin in ED. C/w IV ceftriaxone and azithromycin for now (6/20-  ).  - F/u procalcitonin and legionella, if normal will d/c azithromycin  - F/u Bcx, UCx  - Monitor mental status  - Aspiration precautions and fall risk protocol
Symptoms may also be related to infectious etiology, as pt endorsing dysuria as well as +UA. Also with hazy opacity in R lower lung on CXR.   Less likely aspiration, as pt with no hx of dysphagia, no witnessed choking events.   - S/p IV ceftriaxone and azithromycin in ED. C/w IV ceftriaxone and azithromycin for now (6/20-  ).  - F/u procalcitonin and legionella, if normal will d/c azithromycin  - UCx with >100K E coli, pending sensitivities  - Monitor mental status, overall improving  - Aspiration precautions and fall risk protocol

## 2023-06-23 ENCOUNTER — TRANSCRIPTION ENCOUNTER (OUTPATIENT)
Age: 88
End: 2023-06-23

## 2023-06-23 VITALS — WEIGHT: 125.66 LBS | HEIGHT: 61 IN

## 2023-06-23 LAB
-  AMIKACIN: SIGNIFICANT CHANGE UP
-  AMOXICILLIN/CLAVULANIC ACID: SIGNIFICANT CHANGE UP
-  AMPICILLIN/SULBACTAM: SIGNIFICANT CHANGE UP
-  AMPICILLIN: SIGNIFICANT CHANGE UP
-  AZTREONAM: SIGNIFICANT CHANGE UP
-  CEFAZOLIN: SIGNIFICANT CHANGE UP
-  CEFEPIME: SIGNIFICANT CHANGE UP
-  CEFOXITIN: SIGNIFICANT CHANGE UP
-  CEFTRIAXONE: SIGNIFICANT CHANGE UP
-  CEFUROXIME: SIGNIFICANT CHANGE UP
-  CIPROFLOXACIN: SIGNIFICANT CHANGE UP
-  ERTAPENEM: SIGNIFICANT CHANGE UP
-  GENTAMICIN: SIGNIFICANT CHANGE UP
-  IMIPENEM: SIGNIFICANT CHANGE UP
-  LEVOFLOXACIN: SIGNIFICANT CHANGE UP
-  MEROPENEM: SIGNIFICANT CHANGE UP
-  NITROFURANTOIN: SIGNIFICANT CHANGE UP
-  PIPERACILLIN/TAZOBACTAM: SIGNIFICANT CHANGE UP
-  TOBRAMYCIN: SIGNIFICANT CHANGE UP
-  TRIMETHOPRIM/SULFAMETHOXAZOLE: SIGNIFICANT CHANGE UP
ANION GAP SERPL CALC-SCNC: 12 MMOL/L — SIGNIFICANT CHANGE UP (ref 7–14)
BUN SERPL-MCNC: 18 MG/DL — SIGNIFICANT CHANGE UP (ref 7–23)
CALCIUM SERPL-MCNC: 8.7 MG/DL — SIGNIFICANT CHANGE UP (ref 8.4–10.5)
CHLORIDE SERPL-SCNC: 102 MMOL/L — SIGNIFICANT CHANGE UP (ref 98–107)
CO2 SERPL-SCNC: 23 MMOL/L — SIGNIFICANT CHANGE UP (ref 22–31)
CREAT SERPL-MCNC: 1.39 MG/DL — HIGH (ref 0.5–1.3)
CULTURE RESULTS: SIGNIFICANT CHANGE UP
EGFR: 36 ML/MIN/1.73M2 — LOW
GLUCOSE SERPL-MCNC: 106 MG/DL — HIGH (ref 70–99)
HCT VFR BLD CALC: 32.6 % — LOW (ref 34.5–45)
HGB BLD-MCNC: 10.4 G/DL — LOW (ref 11.5–15.5)
MAGNESIUM SERPL-MCNC: 2 MG/DL — SIGNIFICANT CHANGE UP (ref 1.6–2.6)
MCHC RBC-ENTMCNC: 30.1 PG — SIGNIFICANT CHANGE UP (ref 27–34)
MCHC RBC-ENTMCNC: 31.9 GM/DL — LOW (ref 32–36)
MCV RBC AUTO: 94.2 FL — SIGNIFICANT CHANGE UP (ref 80–100)
METHOD TYPE: SIGNIFICANT CHANGE UP
NRBC # BLD: 0 /100 WBCS — SIGNIFICANT CHANGE UP (ref 0–0)
NRBC # FLD: 0 K/UL — SIGNIFICANT CHANGE UP (ref 0–0)
ORGANISM # SPEC MICROSCOPIC CNT: SIGNIFICANT CHANGE UP
ORGANISM # SPEC MICROSCOPIC CNT: SIGNIFICANT CHANGE UP
PHOSPHATE SERPL-MCNC: 3.1 MG/DL — SIGNIFICANT CHANGE UP (ref 2.5–4.5)
PLATELET # BLD AUTO: 186 K/UL — SIGNIFICANT CHANGE UP (ref 150–400)
POTASSIUM SERPL-MCNC: 4.1 MMOL/L — SIGNIFICANT CHANGE UP (ref 3.5–5.3)
POTASSIUM SERPL-SCNC: 4.1 MMOL/L — SIGNIFICANT CHANGE UP (ref 3.5–5.3)
RBC # BLD: 3.46 M/UL — LOW (ref 3.8–5.2)
RBC # FLD: 14.3 % — SIGNIFICANT CHANGE UP (ref 10.3–14.5)
SODIUM SERPL-SCNC: 137 MMOL/L — SIGNIFICANT CHANGE UP (ref 135–145)
SPECIMEN SOURCE: SIGNIFICANT CHANGE UP
WBC # BLD: 8.87 K/UL — SIGNIFICANT CHANGE UP (ref 3.8–10.5)
WBC # FLD AUTO: 8.87 K/UL — SIGNIFICANT CHANGE UP (ref 3.8–10.5)

## 2023-06-23 PROCEDURE — 99239 HOSP IP/OBS DSCHRG MGMT >30: CPT

## 2023-06-23 RX ORDER — ATORVASTATIN CALCIUM 80 MG/1
1 TABLET, FILM COATED ORAL
Qty: 30 | Refills: 0
Start: 2023-06-23

## 2023-06-23 RX ORDER — CIPROFLOXACIN LACTATE 400MG/40ML
1 VIAL (ML) INTRAVENOUS
Qty: 4 | Refills: 0
Start: 2023-06-23 | End: 2023-06-24

## 2023-06-23 RX ADMIN — CEFTRIAXONE 100 MILLIGRAM(S): 500 INJECTION, POWDER, FOR SOLUTION INTRAMUSCULAR; INTRAVENOUS at 10:59

## 2023-06-23 RX ADMIN — Medication 25 MILLIGRAM(S): at 05:31

## 2023-06-23 RX ADMIN — APIXABAN 2.5 MILLIGRAM(S): 2.5 TABLET, FILM COATED ORAL at 05:28

## 2023-06-23 NOTE — DISCHARGE NOTE NURSING/CASE MANAGEMENT/SOCIAL WORK - PATIENT PORTAL LINK FT
You can access the FollowMyHealth Patient Portal offered by [facility] by registering at the following website: [email or web address]. By joining [facility]’s FollowMyHealth portal, you will also be able to view your health information using other applications (apps) compatible with our system.

## 2023-06-23 NOTE — DISCHARGE NOTE PROVIDER - HOSPITAL COURSE
89 yo F with PMH of AFib on Eliquis, HTN, IBS, and anemia, presenting with difficulty with walking and slurred speech x 1 day, initial code stroke workup unremarkable, admitted for toxic metabolic encephalopathy and further stroke workup. Patient underwent interval CT scans of the head which did not show any acute findings or interval changes. She also underwent TTE which was remarkable only for AS, but no PFO. She was evaluated by speech and swallow and recommended initially for NPO but later received a cinesophagram and was recommended for pureed diet with moderately thickened liquids. Patient was treated for UTI with CTX. UCx grew >100K ecoli which was sensitive to ceftriaxone as well as ciprofloxacin. Patient overall clinically improved with treatment of her UTI and is currently planned for discharge home with home PT (recommended for rehab but family prefers to take patient home). Patient is otherwise medically optimized for discharge home and will take 2 additional days of ciprofloxacin to complete a 5 day course for her UTI.

## 2023-06-23 NOTE — DISCHARGE NOTE PROVIDER - CARE PROVIDER_API CALL
Tommy Archibald  Internal Medicine  [address], [place] [address]  Phone: [phone]  Fax: [phone]  Established Patient  Follow Up Time: 2 weeks

## 2023-06-23 NOTE — DISCHARGE NOTE PROVIDER - NSDCCPCAREPLAN_GEN_ALL_CORE_FT
PRINCIPAL DISCHARGE DIAGNOSIS  Diagnosis: Toxic metabolic encephalopathy  Assessment and Plan of Treatment: You were admitted to the hospital due to slurred speech and arm/leg weakness. Due to the initial presentation of your symptoms, you were evaluated for a possible stroke. We performed CT imaging of your head which was negative for any acute findings. You also underwent an echocardiogram and your heart exam was mostly normal. We did note that your aortic valve in your heart is calcified and stiff, however this is a common finding in older adults. You were however, noted to have a urinary tract infection. We believe that this infection was the most likely cause of your initial symptoms. You were treated with IV antibiotics while in the hospital. You will be prescribed 2 additional days of oral Ciprofloxacin (antibiotic) to help completed a total antibiotic course of 5 days to fully treat your infection. Please be sure to take all of the medication as presribed. Please be sure to follow up with your primary care doctor within 1-2 weeks of discharge for routine post-hospital care. If you experience new onset weakness, fevers, chills, or burning on urination, please contact your physician or return to the hospital.      SECONDARY DISCHARGE DIAGNOSES  Diagnosis: Atrial fibrillation  Assessment and Plan of Treatment: You have a history of an irregular heart rhythm known as atrial fibrillation. This irregular heart rhythm can predispose you to developing blood clots which can travel around your body. You are already taking a medication called Eliquis to help prevent blood clots. Please continue to take Eliquis as prescribed.

## 2023-06-23 NOTE — DISCHARGE NOTE NURSING/CASE MANAGEMENT/SOCIAL WORK - NSSCNAMETXT_GEN_ALL_CORE
[facility]     Rn to make home visit for evaluation within 24hrs after  d/c Soc 6/24/23

## 2023-06-23 NOTE — DISCHARGE NOTE PROVIDER - NSDCFUADDAPPT_GEN_ALL_CORE_FT
Follow up outpatient Speech Pathologist for repeat speech and swallow evaluation within 7-10 days for diet recommendations, otherwise stay on prescribed diet   Call [phone] for Speech Appointment

## 2023-06-23 NOTE — DISCHARGE NOTE NURSING/CASE MANAGEMENT/SOCIAL WORK - NSDCFUADDAPPT_GEN_ALL_CORE_FT
Follow up outpatient Speech Pathologist for repeat speech and swallow evaluation within 7-10 days for diet recommendations, otherwise stay on prescribed diet   Call [phone] for Speech Appointment

## 2023-06-23 NOTE — DISCHARGE NOTE NURSING/CASE MANAGEMENT/SOCIAL WORK - NSDCVIVACCINE_GEN_ALL_CORE_FT
COVID-19, mRNA, LNP-S, PF, 30 mcg/0.3 mL dose, akil-sucrose (Pfizer); 14-Jul-2022 12:55; Kathleen Pabon (RN); [employer]; [id number] (Exp. Date: 29-Aug-2022); IntraMuscular; Deltoid Left.; 0.3 milliLiter(s);   Tdap; 11-Jul-2022 18:18; Carlita Chavis (RN); [employer]; [id number] (Exp. Date: 18-Apr-2024); IntraMuscular; Deltoid Left.; 0.5 milliLiter(s); VIS (VIS Published: 09-May-2013, VIS Presented: 11-Jul-2022);

## 2023-06-23 NOTE — DISCHARGE NOTE PROVIDER - ATTENDING DISCHARGE PHYSICAL EXAMINATION:
Patient seen and examined on day of discharge (6/23/23). Patient sitting in recliner. Reports feeling well. Denies any acute complaints. Reports her dysuria has resolved. Has been tolerating pureed diet without issue. Reports continued improvement in weakness. VSS. Labs stable. No further inpatient workup indicated. Patient recommended for rehab but family opting for home PT. Patient otherwise medically optimized for discharge home with 2 additional days of PO antibiotics.

## 2023-06-23 NOTE — DISCHARGE NOTE PROVIDER - NSDCFUADDINST_GEN_ALL_CORE_FT
Follow up outpatient Speech Pathologist for repeat speech and swallow evaluation within 7-10 days for diet recommendations, otherwise stay on prescribed diet   Call [phone] for Speech/Swallow appointment

## 2023-06-23 NOTE — DISCHARGE NOTE PROVIDER - NSDCMRMEDTOKEN_GEN_ALL_CORE_FT
amLODIPine 5 mg oral tablet: 1 orally once a day  dicyclomine 10 mg oral capsule: 1 cap(s) orally 3 times a day (with meals) as needed for abdominal pain  Eliquis 2.5 mg oral tablet: 1 orally 2 times a day  metoprolol succinate 25 mg oral tablet, extended release: 1 orally once a day   amLODIPine 5 mg oral tablet: 1 orally once a day  atorvastatin 80 mg oral tablet: 1 tab(s) orally once a day (at bedtime)  Cipro 500 mg oral tablet: 1 tab(s) orally 2 times a day for Urinary Tract Infection  dicyclomine 10 mg oral capsule: 1 cap(s) orally 3 times a day (with meals) as needed for abdominal pain  Eliquis 2.5 mg oral tablet: 1 orally 2 times a day  metoprolol succinate 25 mg oral tablet, extended release: 1 orally once a day

## 2023-06-29 RX ORDER — FLUTICASONE FUROATE, UMECLIDINIUM BROMIDE AND VILANTEROL TRIFENATATE 200; 62.5; 25 UG/1; UG/1; UG/1
1 POWDER RESPIRATORY (INHALATION)
Qty: 0 | Refills: 0 | DISCHARGE

## 2023-06-29 RX ORDER — OLMESARTAN MEDOXOMIL 5 MG/1
1 TABLET, FILM COATED ORAL
Qty: 0 | Refills: 0 | DISCHARGE

## 2023-06-29 RX ORDER — CALCIUM CARBONATE 500(1250)
1 TABLET ORAL
Qty: 0 | Refills: 0 | DISCHARGE

## 2023-06-29 RX ORDER — LIDOCAINE 4 G/100G
1 CREAM TOPICAL
Qty: 0 | Refills: 0 | DISCHARGE

## 2023-09-24 LAB
ALBUMIN SERPL ELPH-MCNC: 4.2 G/DL
ALP BLD-CCNC: 53 U/L
ALT SERPL-CCNC: 18 U/L
ANION GAP SERPL CALC-SCNC: 12 MMOL/L
AST SERPL-CCNC: 25 U/L
BILIRUB SERPL-MCNC: 0.2 MG/DL
BUN SERPL-MCNC: 24 MG/DL
CALCIUM SERPL-MCNC: 10.4 MG/DL
CHLORIDE SERPL-SCNC: 104 MMOL/L
CO2 SERPL-SCNC: 24 MMOL/L
CREAT SERPL-MCNC: 1.47 MG/DL
EGFR: 33 ML/MIN/1.73M2
GLUCOSE SERPL-MCNC: 99 MG/DL
POTASSIUM SERPL-SCNC: 5 MMOL/L
PROT SERPL-MCNC: 7.7 G/DL
SODIUM SERPL-SCNC: 140 MMOL/L

## 2024-01-02 ENCOUNTER — APPOINTMENT (OUTPATIENT)
Dept: ENDOCRINOLOGY | Facility: CLINIC | Age: 89
End: 2024-01-02
Payer: MEDICARE

## 2024-01-02 VITALS
WEIGHT: 110 LBS | OXYGEN SATURATION: 96 % | HEART RATE: 75 BPM | DIASTOLIC BLOOD PRESSURE: 80 MMHG | HEIGHT: 57 IN | SYSTOLIC BLOOD PRESSURE: 132 MMHG | BODY MASS INDEX: 23.73 KG/M2

## 2024-01-02 DIAGNOSIS — M81.0 AGE-RELATED OSTEOPOROSIS W/OUT CURRENT PATHOLOGICAL FRACTURE: ICD-10-CM

## 2024-01-02 PROBLEM — K58.9 IRRITABLE BOWEL SYNDROME WITHOUT DIARRHEA: Chronic | Status: ACTIVE | Noted: 2022-07-19

## 2024-01-02 PROBLEM — I10 ESSENTIAL (PRIMARY) HYPERTENSION: Chronic | Status: ACTIVE | Noted: 2022-07-19

## 2024-01-02 PROCEDURE — 99213 OFFICE O/P EST LOW 20 MIN: CPT | Mod: 25

## 2024-01-02 PROCEDURE — 96401 CHEMO ANTI-NEOPL SQ/IM: CPT

## 2024-01-02 RX ORDER — DENOSUMAB 60 MG/ML
60 INJECTION SUBCUTANEOUS
Qty: 1 | Refills: 0 | Status: COMPLETED | OUTPATIENT
Start: 2024-01-02

## 2024-01-02 RX ADMIN — DENOSUMAB 0 MG/ML: 60 INJECTION SUBCUTANEOUS at 00:00

## 2024-01-02 NOTE — PHYSICAL EXAM
[TextEntry] :  GENERAL: No acute distress, clinically eukinetic, normal appearance HEAD: Normocephalic, atraumatic EYES: conjunctivae are pink and moist, no icterus, no proptosis  NECK: thyroid is not enlarged/nodular on palpation, non-tender, no adenopathy CARDIOVASCULAR: well-perfused extremities, no peripheral edema RESPIRATORY: normal chest expansion with good pulmonary effort, no acute respiratory distress MUSCULOSKELETAL: no swelling, normal range of motion, normal gait SKIN: no pallor, no icterus, no rash  NEUROLOGIC: alert and oriented, no evident focal deficits, no tremors  PSYCHIATRIC: mood and affect are normal ENDOCRINE: No obvious stigmata of Cushing's or acromegaly present

## 2024-01-02 NOTE — ASSESSMENT
[FreeTextEntry1] : Patient is a 89-year-old woman with history of osteoporosis here for endocrinology follow-up  1. Osteoporosis Patient was on Fosamax in the past. Was discontinued in the setting of IBS, constipation type. It is unlikely the cause of constipation and patient was started on Prolia in May 2019. Secondary to the COVID pandemic, pace and missed her 6-month dosing in May 2020. Bone density L3, L4 10/18/2022: Age 89: BMD 0.869, T-score -2.1, 18.8% the #, 15.6% of # 4/27/2017: Age 86: BMD 0.752, T-score -3.2, +2.8%, +4.6%** 12/19/2013: Age 83: BMD 0.719, T-score -3.5, -1.6%, -1.6% 11/23/2011: Age 81: BMD 0.732, T-score -3.4  Total hip 10/18/2022: BMD 0.701, T-score -2.0, +6.6% #, +7.4% # 4/27/2017: BMD 0.653, T-score -2.4, -0.7%, -2.0% 12/19/2023: BMD 0.666, T-score -2.3, +1.2%, +1.2% 11/23/2011: BMD 0.657, T-score -2.3  Femoral neck 10/18/2022: BMD 0.681, T-score -1.5, +19.3% #, +17.8% # 4/27/2017: BMD 0.578, T-score -2.4, 1.3%, 1.1% 12/19/2023: BMD 0.572, T-score -2.9, 0.2%, 0.2% 12/21/2011: BMD 0.571, T-score -2.5  33% radius 10/18/2022: BMD 0.558, T-score -2.3, 0.3%, 0.3% 4/27/2017: BMD 0.556, T-score -2.3  Plan: Continue with Prolia every 6 months.  Check calcium and vitamin D level today. Follow-up in 6 months with RN for Prolia injection again.

## 2024-01-02 NOTE — HISTORY OF PRESENT ILLNESS
Due to cancellation in schedule for DOS 12/13/18 at [facility], patient moved up to sx time of 1015. preop from [facility] is doing calls today and will notify patient of new time   [FreeTextEntry1] : 89-year-old woman here for for follow-up of osteoporosis Took bisphosphonate in the past for about 9 years and been on a drug holiday for 4 to 5 years. Patient had no history of ONJ.  No interval fractures.  She had a fall in July 2022, had a shoulder fracture following with Ortho getting physical therapy History of wrist fracture 30 years ago. Had dental implant work that was completed more than 7 years ago. Patient is doing well Lives alone and is very active.  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 No side effects from Prolia Reports that she's not taking Vitamin D or calcium supplements but has a diet that's robust in calcium including milk, cheese and yogurt.   Daughter and son stated that patient had a few falls, she has a walker and cane but does not want to use it.  She denies any fractures in the last 6 months.

## 2024-01-02 NOTE — RESULTS/DATA
[FreeTextEntry1] : Bone density L3, L4 10/18/2022: Age 89: BMD 0.869, T-score -2.1, 18.8% the #, 15.6% of # 4/27/2017: Age 86: BMD 0.752, T-score -3.2, +2.8%, +4.6%** 12/19/2013: Age 83: BMD 0.719, T-score -3.5, -1.6%, -1.6% 11/23/2011: Age 81: BMD 0.732, T-score -3.4  Total hip 10/18/2022: BMD 0.701, T-score -2.0, +6.6% #, +7.4% # 4/27/2017: BMD 0.653, T-score -2.4, -0.7%, -2.0% 12/19/2023: BMD 0.666, T-score -2.3, +1.2%, +1.2% 11/23/2011: BMD 0.657, T-score -2.3  Femoral neck 10/18/2022: BMD 0.681, T-score -1.5, +19.3% #, +17.8% # 4/27/2017: BMD 0.578, T-score -2.4, 1.3%, 1.1% 12/19/2023: BMD 0.572, T-score -2.9, 0.2%, 0.2% 12/21/2011: BMD 0.571, T-score -2.5  33% radius 10/18/2022: BMD 0.558, T-score -2.3, 0.3%, 0.3% 4/27/2017: BMD 0.556, T-score -2.3

## 2024-01-04 LAB
25(OH)D3 SERPL-MCNC: 62.6 NG/ML
ALBUMIN SERPL ELPH-MCNC: 3.9 G/DL
ALP BLD-CCNC: 66 U/L
ALT SERPL-CCNC: 23 U/L
ANION GAP SERPL CALC-SCNC: 14 MMOL/L
AST SERPL-CCNC: 33 U/L
BILIRUB SERPL-MCNC: 0.2 MG/DL
BUN SERPL-MCNC: 24 MG/DL
CALCIUM SERPL-MCNC: 9.8 MG/DL
CHLORIDE SERPL-SCNC: 99 MMOL/L
CO2 SERPL-SCNC: 26 MMOL/L
CREAT SERPL-MCNC: 1.39 MG/DL
EGFR: 35 ML/MIN/1.73M2
GLUCOSE SERPL-MCNC: 110 MG/DL
POTASSIUM SERPL-SCNC: 4.8 MMOL/L
PROT SERPL-MCNC: 6.8 G/DL
SODIUM SERPL-SCNC: 139 MMOL/L

## 2024-03-21 NOTE — PROGRESS NOTE ADULT - PROBLEM SELECTOR PLAN 8
Discharge Note  Short Stay      SUMMARY     Admit Date: 3/21/2024    Attending Physician: Thanh Chen MD PhD    Discharge Physician: Thahn Chen      Discharge Date: 3/21/2024 10:15 AM    Procedure(s) (LRB):  RADIOFREQUENCY ABLATION RIGHT GENICULAR *REP CONFIRMED* *PLAVIX AND ASPIRIN CLEARANCE IN CHART* (Right)    Final Diagnosis: Primary osteoarthritis of right knee [M17.11]    Disposition: Home or self care    Patient Instructions:   Current Discharge Medication List        CONTINUE these medications which have NOT CHANGED    Details   acetaminophen (TYLENOL) 500 MG tablet Take 2 tablets (1,000 mg total) by mouth every 8 (eight) hours as needed (Mild to moderate pain).  Refills: 0      !! acetaminophen (TYLENOL) 650 MG TbSR Take 650 mg by mouth every 8 (eight) hours.      !! acetaminophen (TYLENOL) 650 MG TbSR Take 1 tablet (650 mg total) by mouth every 8 (eight) hours.  Qty: 120 tablet, Refills: 0    Associated Diagnoses: S/P total knee replacement, left      aspirin (ECOTRIN) 81 MG EC tablet Take 81 mg by mouth once daily.      atorvastatin (LIPITOR) 80 MG tablet Take 1 tablet (80 mg total) by mouth once daily.  Qty: 90 tablet, Refills: 3      blood sugar diagnostic Strp 1 strip by Misc.(Non-Drug; Combo Route) route once daily.  Qty: 200 strip, Refills: 11    Comments: TRUE METTICS  Associated Diagnoses: Type 2 diabetes mellitus with diabetic neuropathy, without long-term current use of insulin      calcium carbonate (OS-BAILEE) 500 mg calcium (1,250 mg) tablet Take 1 tablet by mouth once daily.      carvediloL (COREG) 25 MG tablet TAKE 1 TABLET BY MOUTH TWICE DAILY WITH MEALS  Qty: 180 tablet, Refills: 3    Associated Diagnoses: Essential hypertension      clopidogreL (PLAVIX) 75 mg tablet Take 1 tablet by mouth once daily  Qty: 90 tablet, Refills: 3      clotrimazole-betamethasone 1-0.05% (LOTRISONE) cream Apply topically 2 (two) times daily.  Qty: 45 g, Refills: 3    Associated Diagnoses: Tinea cruris       dulaglutide (TRULICITY) 1.5 mg/0.5 mL pen injector Inject 1.5 mg into the skin every 7 days.  Qty: 12 pen, Refills: 3    Associated Diagnoses: Type 2 diabetes mellitus with hyperglycemia, without long-term current use of insulin      famotidine (PEPCID) 20 MG tablet Take 1 tablet (20 mg total) by mouth nightly as needed for Heartburn.  Qty: 90 tablet, Refills: 1    Associated Diagnoses: Gastroesophageal reflux disease, unspecified whether esophagitis present      fluticasone propionate (FLONASE) 50 mcg/actuation nasal spray 1 spray (50 mcg total) by Each Nostril route once daily.  Qty: 16 g, Refills: 3    Associated Diagnoses: Upper respiratory tract infection, unspecified type      glipiZIDE (GLUCOTROL) 10 MG TR24 Take 1 tablet (10 mg total) by mouth 2 (two) times daily with meals.  Qty: 180 tablet, Refills: 3    Associated Diagnoses: Uncontrolled type 2 diabetes mellitus with hyperglycemia, without long-term current use of insulin; Type 2 diabetes mellitus with diabetic neuropathy, without long-term current use of insulin      melatonin (MELATIN) 3 mg tablet Take 2 tablets (6 mg total) by mouth nightly as needed for Insomnia.  Refills: 0      methocarbamoL (ROBAXIN) 750 MG Tab Take 1 tablet (750 mg total) by mouth 4 (four) times daily as needed (for muscle spasms).  Qty: 40 tablet, Refills: 0    Associated Diagnoses: S/P total knee replacement, left      neomycin-polymyxin-hydrocortisone (CORTISPORIN) 3.5-10,000-1 mg/mL-unit/mL-% otic suspension Place 3 drops into both ears 4 (four) times daily.  Qty: 10 mL, Refills: 0      neomycin-polymyxin-hydrocortisone (CORTISPORIN) otic solution INSTILL 3 DROPS INTO RIGHT EAR EVERY 6 HOURS  Qty: 10 mL, Refills: 0    Comments: Suspension ok      oxybutynin (DITROPAN-XL) 5 MG TR24 Take 1 tablet (5 mg total) by mouth once daily.  Qty: 30 tablet, Refills: 11    Associated Diagnoses: Nocturia more than twice per night      pantoprazole (PROTONIX) 40 MG tablet Take 1 tablet by  mouth once daily  Qty: 90 tablet, Refills: 1      senna-docusate 8.6-50 mg (SENNA WITH DOCUSATE SODIUM) 8.6-50 mg per tablet Take 1 tablet by mouth once daily.  Qty: 30 tablet, Refills: 0    Associated Diagnoses: S/P total knee replacement, left      TRUEPLUS LANCETS 33 gauge Misc Use to test blood sugar daily; discard lancet after each use  Qty: 100 each, Refills: 3    Associated Diagnoses: Type 2 diabetes mellitus with diabetic neuropathy, without long-term current use of insulin      valsartan (DIOVAN) 80 MG tablet Take 1 tablet by mouth once daily  Qty: 90 tablet, Refills: 3    Comments: .       !! - Potential duplicate medications found. Please discuss with provider.              Discharge Diagnosis: Primary osteoarthritis of right knee [M17.11]  Condition on Discharge: Stable with no complications to procedure   Diet on Discharge: Same as before.  Activity: as per instruction sheet.  Discharge to: Home with a responsible adult.  Follow up: 2-4 weeks       Please call my office or pager at [phone] if experienced any weakness or loss of sensation, fever > 101.5, pain uncontrolled with oral medications, persistent nausea/vomiting/or diarrhea, redness or drainage from the incisions, or any other worrisome concerns. If physician on call was not reached or could not communicate with our office for any reason please go to the nearest emergency department      Thanh Chen MD PhD   Resolved  Pt currently AOX4   Confusion reported by son on presentation, baseline A&Ox4  Likely due to sepsis and/or hypoxia.

## 2024-04-05 RX ORDER — DICYCLOMINE HYDROCHLORIDE 10 MG/1
10 CAPSULE ORAL
Qty: 120 | Refills: 5 | Status: ACTIVE | COMMUNITY
Start: 2021-12-13 | End: 1900-01-01

## 2024-07-08 ENCOUNTER — APPOINTMENT (OUTPATIENT)
Dept: ENDOCRINOLOGY | Facility: CLINIC | Age: 89
End: 2024-07-08

## 2024-11-05 ENCOUNTER — APPOINTMENT (OUTPATIENT)
Dept: GASTROENTEROLOGY | Facility: CLINIC | Age: 89
End: 2024-11-05
Payer: MEDICARE

## 2024-11-05 VITALS
DIASTOLIC BLOOD PRESSURE: 82 MMHG | HEIGHT: 57 IN | HEART RATE: 73 BPM | BODY MASS INDEX: 27.61 KG/M2 | OXYGEN SATURATION: 95 % | SYSTOLIC BLOOD PRESSURE: 159 MMHG | WEIGHT: 128 LBS

## 2024-11-05 DIAGNOSIS — K58.1 IRRITABLE BOWEL SYNDROME WITH CONSTIPATION: ICD-10-CM

## 2024-11-05 PROCEDURE — 99213 OFFICE O/P EST LOW 20 MIN: CPT

## 2024-11-05 PROCEDURE — G2211 COMPLEX E/M VISIT ADD ON: CPT

## 2024-11-05 RX ORDER — OLMESARTAN MEDOXOMIL 5 MG/1
5 TABLET, FILM COATED ORAL
Refills: 0 | Status: ACTIVE | COMMUNITY

## 2024-12-04 ENCOUNTER — APPOINTMENT (OUTPATIENT)
Dept: ENDOCRINOLOGY | Facility: CLINIC | Age: 88
End: 2024-12-04

## 2024-12-16 ENCOUNTER — APPOINTMENT (OUTPATIENT)
Dept: ENDOCRINOLOGY | Facility: CLINIC | Age: 88
End: 2024-12-16
Payer: MEDICARE

## 2024-12-16 DIAGNOSIS — M81.0 AGE-RELATED OSTEOPOROSIS W/OUT CURRENT PATHOLOGICAL FRACTURE: ICD-10-CM

## 2024-12-16 PROCEDURE — 96401 CHEMO ANTI-NEOPL SQ/IM: CPT

## 2024-12-16 RX ORDER — DENOSUMAB 60 MG/ML
60 INJECTION SUBCUTANEOUS
Qty: 1 | Refills: 0 | Status: COMPLETED | OUTPATIENT
Start: 2024-12-13

## 2025-06-19 ENCOUNTER — APPOINTMENT (OUTPATIENT)
Dept: ENDOCRINOLOGY | Facility: CLINIC | Age: 89
End: 2025-06-19
Payer: MEDICARE

## 2025-06-19 PROCEDURE — 96401 CHEMO ANTI-NEOPL SQ/IM: CPT

## 2025-06-19 RX ORDER — DENOSUMAB 60 MG/ML
60 INJECTION SUBCUTANEOUS
Qty: 1 | Refills: 0 | Status: COMPLETED | OUTPATIENT
Start: 2025-06-11

## 2025-08-11 ENCOUNTER — APPOINTMENT (OUTPATIENT)
Dept: ENDOCRINOLOGY | Facility: CLINIC | Age: 89
End: 2025-08-11